# Patient Record
Sex: MALE | Race: WHITE | Employment: OTHER | URBAN - METROPOLITAN AREA
[De-identification: names, ages, dates, MRNs, and addresses within clinical notes are randomized per-mention and may not be internally consistent; named-entity substitution may affect disease eponyms.]

---

## 2017-03-03 ENCOUNTER — APPOINTMENT (OUTPATIENT)
Dept: CT IMAGING | Facility: HOSPITAL | Age: 72
DRG: 065 | End: 2017-03-03
Attending: EMERGENCY MEDICINE
Payer: MEDICARE

## 2017-03-03 ENCOUNTER — HOSPITAL ENCOUNTER (INPATIENT)
Facility: HOSPITAL | Age: 72
LOS: 4 days | Discharge: HOME HEALTH | DRG: 065 | End: 2017-03-07
Attending: EMERGENCY MEDICINE | Admitting: HOSPITALIST
Payer: MEDICARE

## 2017-03-03 ENCOUNTER — APPOINTMENT (OUTPATIENT)
Dept: GENERAL RADIOLOGY | Facility: HOSPITAL | Age: 72
DRG: 065 | End: 2017-03-03
Attending: EMERGENCY MEDICINE
Payer: MEDICARE

## 2017-03-03 DIAGNOSIS — J18.9 COMMUNITY ACQUIRED PNEUMONIA: Primary | ICD-10-CM

## 2017-03-03 DIAGNOSIS — I63.539 CEREBROVASCULAR ACCIDENT (CVA) DUE TO OCCLUSION OF POSTERIOR CEREBRAL ARTERY, UNSPECIFIED BLOOD VESSEL LATERALITY (HCC): ICD-10-CM

## 2017-03-03 LAB
ALBUMIN SERPL BCP-MCNC: 3.4 G/DL (ref 3.5–4.8)
ALP SERPL-CCNC: 81 U/L (ref 32–100)
ALT SERPL-CCNC: 19 U/L (ref 17–63)
ANION GAP SERPL CALC-SCNC: 8 MMOL/L (ref 0–18)
AST SERPL-CCNC: 18 U/L (ref 15–41)
BASOPHILS # BLD: 0 K/UL (ref 0–0.2)
BASOPHILS NFR BLD: 0 %
BILIRUB DIRECT SERPL-MCNC: 0.1 MG/DL (ref 0–0.2)
BILIRUB SERPL-MCNC: 0.5 MG/DL (ref 0.3–1.2)
BILIRUB UR QL: NEGATIVE
BUN SERPL-MCNC: 8 MG/DL (ref 8–20)
BUN/CREAT SERPL: 9.8 (ref 10–20)
CALCIUM SERPL-MCNC: 8.9 MG/DL (ref 8.5–10.5)
CHLORIDE SERPL-SCNC: 107 MMOL/L (ref 95–110)
CLARITY UR: CLEAR
CO2 SERPL-SCNC: 28 MMOL/L (ref 22–32)
COLOR UR: YELLOW
CREAT SERPL-MCNC: 0.82 MG/DL (ref 0.5–1.5)
EOSINOPHIL # BLD: 0 K/UL (ref 0–0.7)
EOSINOPHIL NFR BLD: 0 %
ERYTHROCYTE [DISTWIDTH] IN BLOOD BY AUTOMATED COUNT: 17.2 % (ref 11–15)
GLUCOSE SERPL-MCNC: 200 MG/DL (ref 70–99)
HCT VFR BLD AUTO: 40.7 % (ref 41–52)
HGB BLD-MCNC: 13.5 G/DL (ref 13.5–17.5)
HGB UR QL STRIP.AUTO: NEGATIVE
LACTATE SERPL-SCNC: 1.3 MMOL/L (ref 0.5–2.2)
LEUKOCYTE ESTERASE UR QL STRIP.AUTO: NEGATIVE
LYMPHOCYTES # BLD: 0.9 K/UL (ref 1–4)
LYMPHOCYTES NFR BLD: 11 %
MCH RBC QN AUTO: 29 PG (ref 27–32)
MCHC RBC AUTO-ENTMCNC: 33.2 G/DL (ref 32–37)
MCV RBC AUTO: 87.3 FL (ref 80–100)
MONOCYTES # BLD: 0.3 K/UL (ref 0–1)
MONOCYTES NFR BLD: 3 %
NEUTROPHILS # BLD AUTO: 6.7 K/UL (ref 1.8–7.7)
NEUTROPHILS NFR BLD: 85 %
NITRITE UR QL STRIP.AUTO: NEGATIVE
OSMOLALITY UR CALC.SUM OF ELEC: 300 MOSM/KG (ref 275–295)
PH UR: 7 [PH] (ref 5–8)
PLATELET # BLD AUTO: 269 K/UL (ref 140–400)
PMV BLD AUTO: 8.2 FL (ref 7.4–10.3)
POTASSIUM SERPL-SCNC: 3.5 MMOL/L (ref 3.3–5.1)
PROT SERPL-MCNC: 6.1 G/DL (ref 5.9–8.4)
PROT UR-MCNC: NEGATIVE MG/DL
RBC # BLD AUTO: 4.65 M/UL (ref 4.5–5.9)
SODIUM SERPL-SCNC: 143 MMOL/L (ref 136–144)
TROPONIN I SERPL-MCNC: 0.01 NG/ML (ref ?–0.03)
TROPONIN I SERPL-MCNC: 0.01 NG/ML (ref ?–0.03)
UROBILINOGEN UR STRIP-ACNC: <2
VIT C UR-MCNC: NEGATIVE MG/DL
WBC # BLD AUTO: 7.9 K/UL (ref 4–11)

## 2017-03-03 PROCEDURE — 71010 XR CHEST AP PORTABLE  (CPT=71010): CPT

## 2017-03-03 PROCEDURE — 71260 CT THORAX DX C+: CPT

## 2017-03-03 RX ORDER — 0.9 % SODIUM CHLORIDE 0.9 %
VIAL (ML) INJECTION
Status: COMPLETED
Start: 2017-03-03 | End: 2017-03-03

## 2017-03-03 RX ORDER — SODIUM CHLORIDE 9 MG/ML
INJECTION, SOLUTION INTRAVENOUS CONTINUOUS
Status: DISCONTINUED | OUTPATIENT
Start: 2017-03-03 | End: 2017-03-04

## 2017-03-03 RX ORDER — ACETAMINOPHEN 325 MG/1
650 TABLET ORAL EVERY 6 HOURS PRN
Status: DISCONTINUED | OUTPATIENT
Start: 2017-03-03 | End: 2017-03-07

## 2017-03-03 RX ORDER — BISACODYL 10 MG
10 SUPPOSITORY, RECTAL RECTAL
Status: DISCONTINUED | OUTPATIENT
Start: 2017-03-03 | End: 2017-03-07

## 2017-03-03 RX ORDER — ONDANSETRON 2 MG/ML
4 INJECTION INTRAMUSCULAR; INTRAVENOUS ONCE
Status: COMPLETED | OUTPATIENT
Start: 2017-03-03 | End: 2017-03-03

## 2017-03-03 RX ORDER — DILTIAZEM HYDROCHLORIDE 180 MG/1
180 CAPSULE, COATED, EXTENDED RELEASE ORAL DAILY
Status: ON HOLD | COMMUNITY
End: 2017-03-07

## 2017-03-03 RX ORDER — ENOXAPARIN SODIUM 100 MG/ML
40 INJECTION SUBCUTANEOUS DAILY
Status: DISCONTINUED | OUTPATIENT
Start: 2017-03-04 | End: 2017-03-07

## 2017-03-03 RX ORDER — AMLODIPINE BESYLATE 10 MG/1
10 TABLET ORAL DAILY
Status: ON HOLD | COMMUNITY
End: 2017-03-07

## 2017-03-03 RX ORDER — POLYETHYLENE GLYCOL 3350 17 G/17G
17 POWDER, FOR SOLUTION ORAL DAILY PRN
Status: DISCONTINUED | OUTPATIENT
Start: 2017-03-03 | End: 2017-03-07

## 2017-03-03 RX ORDER — ASPIRIN 325 MG
325 TABLET ORAL DAILY
Status: ON HOLD | COMMUNITY
End: 2017-03-07

## 2017-03-03 RX ORDER — DOCUSATE SODIUM 100 MG/1
100 CAPSULE, LIQUID FILLED ORAL 2 TIMES DAILY PRN
Status: DISCONTINUED | OUTPATIENT
Start: 2017-03-03 | End: 2017-03-07

## 2017-03-03 RX ORDER — ONDANSETRON 2 MG/ML
4 INJECTION INTRAMUSCULAR; INTRAVENOUS EVERY 6 HOURS PRN
Status: DISCONTINUED | OUTPATIENT
Start: 2017-03-03 | End: 2017-03-07

## 2017-03-03 RX ORDER — METOCLOPRAMIDE HYDROCHLORIDE 5 MG/ML
10 INJECTION INTRAMUSCULAR; INTRAVENOUS EVERY 8 HOURS PRN
Status: DISCONTINUED | OUTPATIENT
Start: 2017-03-03 | End: 2017-03-07

## 2017-03-03 RX ORDER — TRIAMTERENE AND HYDROCHLOROTHIAZIDE 37.5; 25 MG/1; MG/1
1 CAPSULE ORAL EVERY MORNING
Status: ON HOLD | COMMUNITY
End: 2017-03-07

## 2017-03-03 NOTE — ED PROVIDER NOTES
Patient Seen in: [facility] AND [facility] Emergency Department    History   Patient presents with:  Dizziness (neurologic)  Nausea/Vomiting/Diarrhea (gastrointestinal)    Stated Complaint:     HPI    65-year-old male with history of hypertension presents with c Current:/53 mmHg  Pulse 69  Temp(Src) 97.5 °F (36.4 °C) (Oral)  Resp 18  Ht 162.6 cm (5' 4\")  Wt 77.111 kg  BMI 29.17 kg/m2  SpO2 96%        Physical Exam    General Appearance: alert, uncomfortable appearing  Eyes: pupils equal and round no p -----------         ------                     CBC W/ DIFFERENTIAL[[id number]]          Abnormal            Final result                 Please view results for these tests on the individual orders.    URINALYSIS WITH CULTURE REFLEX   DAVID DRAW BLUE

## 2017-03-04 ENCOUNTER — APPOINTMENT (OUTPATIENT)
Dept: CT IMAGING | Facility: HOSPITAL | Age: 72
DRG: 065 | End: 2017-03-04
Attending: HOSPITALIST
Payer: MEDICARE

## 2017-03-04 ENCOUNTER — APPOINTMENT (OUTPATIENT)
Dept: CV DIAGNOSTICS | Facility: HOSPITAL | Age: 72
DRG: 065 | End: 2017-03-04
Attending: HOSPITALIST
Payer: MEDICARE

## 2017-03-04 LAB
ANION GAP SERPL CALC-SCNC: 5 MMOL/L (ref 0–18)
BASOPHILS # BLD: 0 K/UL (ref 0–0.2)
BASOPHILS NFR BLD: 0 %
BUN SERPL-MCNC: 7 MG/DL (ref 8–20)
BUN/CREAT SERPL: 9 (ref 10–20)
CALCIUM SERPL-MCNC: 8.3 MG/DL (ref 8.5–10.5)
CHLORIDE SERPL-SCNC: 110 MMOL/L (ref 95–110)
CHOLEST SERPL-MCNC: 150 MG/DL (ref 110–200)
CO2 SERPL-SCNC: 29 MMOL/L (ref 22–32)
CREAT SERPL-MCNC: 0.78 MG/DL (ref 0.5–1.5)
EOSINOPHIL # BLD: 0.1 K/UL (ref 0–0.7)
EOSINOPHIL NFR BLD: 1 %
ERYTHROCYTE [DISTWIDTH] IN BLOOD BY AUTOMATED COUNT: 16.9 % (ref 11–15)
GLUCOSE SERPL-MCNC: 123 MG/DL (ref 70–99)
HBA1C MFR BLD: 7.5 % (ref 4–6)
HCT VFR BLD AUTO: 35.7 % (ref 41–52)
HDLC SERPL-MCNC: 35 MG/DL
HGB BLD-MCNC: 11.7 G/DL (ref 13.5–17.5)
LDLC SERPL CALC-MCNC: 94 MG/DL (ref 0–99)
LYMPHOCYTES # BLD: 1.3 K/UL (ref 1–4)
LYMPHOCYTES NFR BLD: 17 %
MAGNESIUM SERPL-MCNC: 1.9 MG/DL (ref 1.8–2.5)
MCH RBC QN AUTO: 28.8 PG (ref 27–32)
MCHC RBC AUTO-ENTMCNC: 32.9 G/DL (ref 32–37)
MCV RBC AUTO: 87.5 FL (ref 80–100)
MONOCYTES # BLD: 0.4 K/UL (ref 0–1)
MONOCYTES NFR BLD: 6 %
NEUTROPHILS # BLD AUTO: 5.7 K/UL (ref 1.8–7.7)
NEUTROPHILS NFR BLD: 76 %
NONHDLC SERPL-MCNC: 115 MG/DL
OSMOLALITY UR CALC.SUM OF ELEC: 297 MOSM/KG (ref 275–295)
PLATELET # BLD AUTO: 241 K/UL (ref 140–400)
PMV BLD AUTO: 8.1 FL (ref 7.4–10.3)
POTASSIUM SERPL-SCNC: 3.5 MMOL/L (ref 3.3–5.1)
PROCALCITONIN SERPL-MCNC: <0.05 NG/ML (ref ?–0.11)
RBC # BLD AUTO: 4.07 M/UL (ref 4.5–5.9)
SODIUM SERPL-SCNC: 144 MMOL/L (ref 136–144)
T3 SERPL-MCNC: 0.88 NG/ML (ref 0.87–1.78)
T4 FREE SERPL-MCNC: 0.79 NG/DL (ref 0.58–1.64)
TRIGL SERPL-MCNC: 107 MG/DL (ref 1–149)
TSH SERPL-ACNC: 0.07 UIU/ML (ref 0.34–5.6)
WBC # BLD AUTO: 7.5 K/UL (ref 4–11)

## 2017-03-04 PROCEDURE — 93306 TTE W/DOPPLER COMPLETE: CPT

## 2017-03-04 PROCEDURE — 70450 CT HEAD/BRAIN W/O DYE: CPT

## 2017-03-04 PROCEDURE — 93306 TTE W/DOPPLER COMPLETE: CPT | Performed by: INTERNAL MEDICINE

## 2017-03-04 RX ORDER — CLOPIDOGREL BISULFATE 75 MG/1
75 TABLET ORAL DAILY
Status: DISCONTINUED | OUTPATIENT
Start: 2017-03-04 | End: 2017-03-05

## 2017-03-04 RX ORDER — MECLIZINE HCL 12.5 MG/1
12.5 TABLET ORAL 3 TIMES DAILY PRN
Status: DISCONTINUED | OUTPATIENT
Start: 2017-03-04 | End: 2017-03-07

## 2017-03-04 RX ORDER — ASPIRIN 81 MG/1
81 TABLET, CHEWABLE ORAL DAILY
Status: DISCONTINUED | OUTPATIENT
Start: 2017-03-04 | End: 2017-03-05

## 2017-03-04 RX ORDER — POTASSIUM CHLORIDE 20 MEQ/1
40 TABLET, EXTENDED RELEASE ORAL EVERY 4 HOURS
Status: COMPLETED | OUTPATIENT
Start: 2017-03-04 | End: 2017-03-04

## 2017-03-04 NOTE — DISCHARGE PLANNING
LELA following up on d/c planning for the patient. He lives alone and has been independent with his care. He uses no assistive devices and has no h/o HHC or rehab. He is active and was driving prior to admission.  No d/c needs identified at this time, but LELA

## 2017-03-04 NOTE — PROGRESS NOTES
[facility] Hospitalist Progress Note     PCP: Awais Dior.  Kelly Stinson MD    CC: Follow up       Assessment/Plan:     Luke Bobo is a 66year old male with PMH sig for HTN on mulitple meds who presents with 2 days of nausea/vomiting and dizzness.  FOund to have po Service number: [phone]    Subjective     States he feels ab out the same. Only symptomatic when sitting up or standing. Denies vertigo.    No CP, SOB, or N/V.  NO BM's      Objective     OBJECTIVE:  Temp:  [97 °F (36.1 °C)-98.4 °F (36.9 °C)] 98.4 °F 2137   [facility]  0.01  0.01       Imaging:  Ct Brain Or Head (34802)    3/4/2017  PROCEDURE: CT BRAIN OR HEAD (CPT=70450)  COMPARISON: None. INDICATIONS: Lightheadedness and dizziness.   TECHNIQUE: CT images were obtained without contrast material.  Automated silhouette abnormality or cardiomegaly. Unremarkable pulmonary vasculature. MEDIAST/BRENNA:   Atherosclerotic aorta with no visible aneurysm. LUNGS/PLEURA: There is elevation of the right hemidiaphragm .   There is a small opacity in the peripheral aspect LUNGS/PLEURA: Central airways are patent. Bilateral perihilar bronchial wall thickening. Minimal dependent subsegmental atelectasis in the lung bases. Small ill-defined pulmonary opacity in the middle lobe.   Scattered calcified granulomas in the middle Coronary artery calcifications

## 2017-03-04 NOTE — PROGRESS NOTES
OCCUPATIONAL THERAPY EVALUATION - INPATIENT     Room Number: 544/544-A  Evaluation Date: 3/4/2017  Type of Evaluation: Initial  Presenting Problem: PNA    Physician Order: IP Consult to Occupational Therapy  Reason for Therapy: ADL/IADL Dysfunction and Dis Inpatient Daily Activity Short Form  How much help from another person does the patient currently need…  -   Putting on and taking off regular lower body clothing?: None  -   Bathing (including washing, rinsing, drying)?: A Little  -   Toileting, which inc

## 2017-03-04 NOTE — PLAN OF CARE
Problem: Patient/Family Goals  Goal: Patient/Family Long Term Goal  Patient’s Long Term Goal:To be ready for discharge    Interventions:  - monitor vital signs  -administer medications  - See additional Care Plan goals for specific interventions   Outcome: limitations  - Instruct pt to call for assistance with activity based on assessment  - Modify environment to reduce risk of injury  - Provide assistive devices as appropriate  - Consider OT/PT consult to assist with strengthening/mobility  - Encourage toil

## 2017-03-04 NOTE — H&P
[facility] Hospitalist H&P     CC: Patient presents with:  Dizziness (neurologic)  Nausea/Vomiting/Diarrhea (gastrointestinal)       PCP: Ita Robles.  Odilia Sanon MD      Assessment and Plan     Ana Magallanes is a 66year old male with PMH sig for HTN on mulitple med PMH  Past Medical History   Diagnosis Date   • Essential hypertension         PSH  History reviewed. No pertinent past surgical history.      ALL:    Penicillins                  Home Medications:    Outpatient Prescriptions Marked as Taking for the 3/3 PROCEDURE: XR CHEST AP PORTABLE (CPT=71010) TIME: 1636 hr.   COMPARISON: [facility], X CHEST PA LAT ROUTINE, 9/19/2014, 9:10. INDICATIONS: Weakness, dizziness and nausea for 2 days. TECHNIQUE:   Single view.    FINDINGS:  CARDIAC/ the thyroid, with both lobes demonstrating multiple nodules. There  is also nodular extension of the posterior lower pole of the thyroid, which measures approximately 37 mm, extending into the upper mediastinum. No mediastinal or hilar lymphadenopathy.  L function tests. 4.  Scattered calcified granulomas within the right lung. 5. Low-density foci within the liver most likely representing cysts however they are not fully characterized on this exam. 6.  Colonic diverticulosis. 7.  Small sized hiatal hernia.

## 2017-03-04 NOTE — CONSULTS
ENT consult  65 y/o male admitted with nausea, vomitting, lightheadedness starting on 3/2/17. His symptoms are worse when he gets up. There is no true vertigo.   A CT revealed a large multinodular goiter with substernal extension and some tracheal narrowi

## 2017-03-05 LAB
ANION GAP SERPL CALC-SCNC: 7 MMOL/L (ref 0–18)
BASOPHILS # BLD: 0 K/UL (ref 0–0.2)
BASOPHILS NFR BLD: 1 %
BUN SERPL-MCNC: 7 MG/DL (ref 8–20)
BUN/CREAT SERPL: 8.8 (ref 10–20)
CALCIUM SERPL-MCNC: 8.8 MG/DL (ref 8.5–10.5)
CHLORIDE SERPL-SCNC: 110 MMOL/L (ref 95–110)
CO2 SERPL-SCNC: 26 MMOL/L (ref 22–32)
CREAT SERPL-MCNC: 0.8 MG/DL (ref 0.5–1.5)
EOSINOPHIL # BLD: 0.2 K/UL (ref 0–0.7)
EOSINOPHIL NFR BLD: 3 %
ERYTHROCYTE [DISTWIDTH] IN BLOOD BY AUTOMATED COUNT: 17.1 % (ref 11–15)
GLUCOSE SERPL-MCNC: 124 MG/DL (ref 70–99)
HCT VFR BLD AUTO: 39 % (ref 41–52)
HGB BLD-MCNC: 12.8 G/DL (ref 13.5–17.5)
LYMPHOCYTES # BLD: 1.6 K/UL (ref 1–4)
LYMPHOCYTES NFR BLD: 26 %
MCH RBC QN AUTO: 28.9 PG (ref 27–32)
MCHC RBC AUTO-ENTMCNC: 32.7 G/DL (ref 32–37)
MCV RBC AUTO: 88.2 FL (ref 80–100)
MONOCYTES # BLD: 0.4 K/UL (ref 0–1)
MONOCYTES NFR BLD: 6 %
NEUTROPHILS # BLD AUTO: 4 K/UL (ref 1.8–7.7)
NEUTROPHILS NFR BLD: 64 %
OSMOLALITY UR CALC.SUM OF ELEC: 295 MOSM/KG (ref 275–295)
PLATELET # BLD AUTO: 254 K/UL (ref 140–400)
PMV BLD AUTO: 8.3 FL (ref 7.4–10.3)
POTASSIUM SERPL-SCNC: 4 MMOL/L (ref 3.3–5.1)
POTASSIUM SERPL-SCNC: 4.1 MMOL/L (ref 3.3–5.1)
RBC # BLD AUTO: 4.42 M/UL (ref 4.5–5.9)
SODIUM SERPL-SCNC: 143 MMOL/L (ref 136–144)
WBC # BLD AUTO: 6.2 K/UL (ref 4–11)

## 2017-03-05 PROCEDURE — 99223 1ST HOSP IP/OBS HIGH 75: CPT | Performed by: OTHER

## 2017-03-05 RX ORDER — AMLODIPINE BESYLATE 5 MG/1
5 TABLET ORAL DAILY
Status: DISCONTINUED | OUTPATIENT
Start: 2017-03-05 | End: 2017-03-07

## 2017-03-05 RX ORDER — ATORVASTATIN CALCIUM 10 MG/1
10 TABLET, FILM COATED ORAL NIGHTLY
Status: DISCONTINUED | OUTPATIENT
Start: 2017-03-05 | End: 2017-03-07

## 2017-03-05 RX ORDER — METHIMAZOLE 5 MG/1
5 TABLET ORAL DAILY
Status: DISCONTINUED | OUTPATIENT
Start: 2017-03-05 | End: 2017-03-07

## 2017-03-05 RX ORDER — DILTIAZEM HYDROCHLORIDE 180 MG/1
180 CAPSULE, COATED, EXTENDED RELEASE ORAL DAILY
Status: DISCONTINUED | OUTPATIENT
Start: 2017-03-05 | End: 2017-03-05

## 2017-03-05 RX ORDER — ASPIRIN 325 MG
325 TABLET ORAL DAILY
Status: DISCONTINUED | OUTPATIENT
Start: 2017-03-05 | End: 2017-03-07

## 2017-03-05 NOTE — PLAN OF CARE
Patient Centered Care    • Patient preferences are identified and integrated in the patient's plan of care Progressing        Patient/Family Goals    • Patient/Family Long Term Goal Progressing    • Patient/Family Short Term Goal Progressing        RESPIRA

## 2017-03-05 NOTE — PROGRESS NOTES
Dr. Lakeisha Zee notified patient had 2.42 sec pause,  then SR 70. /61, HR 62, pulse ox 95%. Patient asymptomatic.

## 2017-03-05 NOTE — PROGRESS NOTES
Endocrine Note    Full Consult Note to follow    65 y/o M presented with palpitations, shortness and breath and new onset chest pain. CT scan demonstrated multinodular goiter and labs reveal subclinical hyperthyroidism.   Reviewed ENT consult and sign out

## 2017-03-05 NOTE — PROGRESS NOTES
[facility] Hospitalist Progress Note     PCP: Johanna Mcgregor. Ignacia Potter MD    CC: Follow up.  Thyroid enlargement, dizzines       Assessment/Plan:     Marianna Pacheco is a 66year old male with PMH sig for HTN on mulitple meds who presents with 2 days of nausea/vomiting plan  -Discussed with Endo, TSH low but Free T3 and T4 normal.  Thyroid US ordered.   -started on methimazole on 3/5    FEN  -d/c IVF, good po intake,  neg orthostatics  -lytes in AM  -general diet    PPX; Lovenox      Further recommendations pending patien magnesium hydroxide    Data Review:       Labs:     Recent Labs   Lab  03/03/17   1606  03/04/17   0611  03/05/17   0726   WBC  7.9  7.5  6.2   HGB  13.5  11.7*  12.8*   MCV  87.3  87.5  88.2   PLT  269  241  254       Recent Labs   Lab  03/03/17   1606  0

## 2017-03-05 NOTE — HISTORICAL OFFICE NOTE
Aurelia Trevino  : 1945  ACCOUNT:  [id number]  [phone]  PCP:  None  TODAY'S DATE: 10/21/2016  DICTATED BY:  Bridger Sabillon M.D.]    CHIEF COMPLAINT: [Followup of Supraventricular Tachycardia.]    HPI:  [On 10/21/2016, Camelia Rosales, a 54-year-old m normocephalic. EYES: conjunctivae not injected and no xanthelasma. ENT: mucosa pink and moist. NECK: jugular venous pressure not elevated. RESP: respirations with normal rate and rhythm, clear to auscultation.  GI: no masses, tenderness or hepatosplenomegal BP: 150 / 70   Study date:Jul 8 2014 7:01AM Location: [place]   Ordering MD:      Dustin Pimentel MD   Interpreting Physician:  Dustin Pimentel MD  Sonographer:      Radha Barrera RDCS   ?  Left ventricle:  The cavity size was normal. Systolic function was jacqueline

## 2017-03-05 NOTE — CONSULTS
Neurology Inpatient Consult Note    Justine Cotter : 3/26/1945   Referring Physician: Dr. Jacey Hewitt  HPI:     Justine Cotter is a 66year old male who is being seen in neurologic evaluation. Patient is being seen in evaluation for dizziness.   P pain, no palpitations  GI: see HPI  GENITAL/: no dysuria, no frequency  MUSCULOSKELETAL: see HPI  PSYCH: no depression, no anxiety  NEURO: see HPI    EXAM:     Vitals:  /61 mmHg  Pulse 62  Temp(Src) 99 °F (37.2 °C) (Oral)  Resp 18  Ht 64.8\"  Wt 16 valve: Cusp separation was reduced. There was mild stenosis. Mean     gradient: 13mm Hg (S). 3. Atrial septum: A patent foramen ovale cannot be excluded.       EKG: Sinus rhythm      LABS: reviewed   LDL: 94    ASSESSMENT AND PLAN:   Dizziness  Although no

## 2017-03-06 ENCOUNTER — APPOINTMENT (OUTPATIENT)
Dept: MRI IMAGING | Facility: HOSPITAL | Age: 72
DRG: 065 | End: 2017-03-06
Attending: Other
Payer: MEDICARE

## 2017-03-06 ENCOUNTER — APPOINTMENT (OUTPATIENT)
Dept: ULTRASOUND IMAGING | Facility: HOSPITAL | Age: 72
DRG: 065 | End: 2017-03-06
Attending: HOSPITALIST
Payer: MEDICARE

## 2017-03-06 LAB
BASOPHILS # BLD: 0 K/UL (ref 0–0.2)
BASOPHILS NFR BLD: 1 %
EOSINOPHIL # BLD: 0.1 K/UL (ref 0–0.7)
EOSINOPHIL NFR BLD: 2 %
ERYTHROCYTE [DISTWIDTH] IN BLOOD BY AUTOMATED COUNT: 17.2 % (ref 11–15)
HCT VFR BLD AUTO: 38.1 % (ref 41–52)
HGB BLD-MCNC: 12.6 G/DL (ref 13.5–17.5)
LYMPHOCYTES # BLD: 1.5 K/UL (ref 1–4)
LYMPHOCYTES NFR BLD: 22 %
MAGNESIUM SERPL-MCNC: 2 MG/DL (ref 1.8–2.5)
MCH RBC QN AUTO: 28.6 PG (ref 27–32)
MCHC RBC AUTO-ENTMCNC: 33 G/DL (ref 32–37)
MCV RBC AUTO: 86.8 FL (ref 80–100)
MONOCYTES # BLD: 0.4 K/UL (ref 0–1)
MONOCYTES NFR BLD: 6 %
NEUTROPHILS # BLD AUTO: 4.6 K/UL (ref 1.8–7.7)
NEUTROPHILS NFR BLD: 70 %
PLATELET # BLD AUTO: 245 K/UL (ref 140–400)
PMV BLD AUTO: 8 FL (ref 7.4–10.3)
POTASSIUM SERPL-SCNC: 4.1 MMOL/L (ref 3.3–5.1)
RBC # BLD AUTO: 4.39 M/UL (ref 4.5–5.9)
T3FREE SERPL-MCNC: 3.7 PG/ML (ref 2.53–4.29)
WBC # BLD AUTO: 6.6 K/UL (ref 4–11)

## 2017-03-06 PROCEDURE — 76536 US EXAM OF HEAD AND NECK: CPT

## 2017-03-06 PROCEDURE — 70549 MR ANGIOGRAPH NECK W/O&W/DYE: CPT

## 2017-03-06 PROCEDURE — 70544 MR ANGIOGRAPHY HEAD W/O DYE: CPT

## 2017-03-06 PROCEDURE — 99222 1ST HOSP IP/OBS MODERATE 55: CPT | Performed by: INTERNAL MEDICINE

## 2017-03-06 PROCEDURE — 70551 MRI BRAIN STEM W/O DYE: CPT

## 2017-03-06 PROCEDURE — 99232 SBSQ HOSP IP/OBS MODERATE 35: CPT | Performed by: OTHER

## 2017-03-06 RX ORDER — CLOPIDOGREL BISULFATE 75 MG/1
75 TABLET ORAL DAILY
Status: DISCONTINUED | OUTPATIENT
Start: 2017-03-06 | End: 2017-03-07

## 2017-03-06 NOTE — CM/SW NOTE
CTL/Rounds: Spoke with patient at bedside regarding discharge needs. Patient states that he is independent with ADLs and driving. He lives alone but has friends that will check in on him. He is ambulating in room without assist. He is on RA.  Patient states

## 2017-03-06 NOTE — PROGRESS NOTES
[facility] Heart Cardiology   Progress Note    María Haque Patient Status:  Inpatient    3/26/1945 MRN [id number]   Location [facility] 5SW/SE Attending Katiana Cervantes MD   [address] Day # 3 PCP Caty Arreola.  Julio C Ferreira MD     Hx of awaiting MRI/MRA and carotid reults  -orthostatics appear negative, recheck today  -echo on 3/4 with EF55-60%, no WMA, mild AS, PFO not excluded  -LDL=94 and goal LDL<70, started on Lipitor 10mg    3) Hyperthyroidism  -per ENT and endocrinology, started on

## 2017-03-06 NOTE — PROGRESS NOTES
Neurology Inpatient Follow-up Note      HPI:     Patient is being seen in follow up. Symptoms about the same.       Past Medical Hisotory:  Reviewed    Medications:  Reviewed    Allergies:    Penicillins                   ROS:   GENERAL: no weight loss or bifurcation without significant stenosis. 2. 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   3. Normal caliber b page with any additional questions / concerns.     Gwen Castano MD  [address] [place], [place] [id number] [phone]

## 2017-03-06 NOTE — CONSULTS
[facility]    PATIENT'S NAME: Yuridia Nurse   ATTENDING PHYSICIAN: Cruz Cortez MD   CONSULTING PHYSICIAN: Ivan Sears.  Natasha Zhu MD   PATIENT ACCOUNT#:   [id number]    LOCATION:  [address] [place] [address] Dr RECORD #:   [id number]       DATE OF BIRTH:  [birth year] This would be determined by his ability to control his thyroid function and his other medical conditions. Currently he is now being evaluated and possibly treated for a new stroke, and will be started with anticoagulation.   This may preclude any treatment

## 2017-03-06 NOTE — CONSULTS
[facility]    Report of Consultation    Jose G File Patient Status:  Inpatient    3/26/1945 MRN [id number]   Location [facility] 5SW/SE Attending Priyanka Russell MD   [facility] Day # 3 PCP Aleyda Rodriguez.  Rosangela Melendez MD     Date of Admission:  3 (COLACE) cap 100 mg, 100 mg, Oral, BID PRN  •  PEG 3350 (MIRALAX) powder packet 17 g, 17 g, Oral, Daily PRN  •  bisacodyl (DULCOLAX) rectal suppository 10 mg, 10 mg, Rectal, Daily PRN  •  magnesium hydroxide (MILK OF MAGNESIA) 400 MG/5ML suspension 30 mL, pruritus, rash, urticaria, arthralgias, arthritis, fever, abnormal taste sensation, nausea, or vomiting in up to 13 percent of patients   > agranulocytosis (prevalence of 0.1 to 0.5 %, and usually occurs within the first two months of treatment, but may oc

## 2017-03-06 NOTE — PLAN OF CARE
SAFETY ADULT - FALL    • Free from fall injury Progressing        Patient denies dizziness today,  Gait steady, patient calls for assistance appropriately.   nonslip stockings on,  frquent rounding,

## 2017-03-06 NOTE — PROGRESS NOTES
[facility] Hospitalist Progress Note     PCP: Shelli Maza. Kody Osorio MD    CC: Follow up.  Thyroid enlargement, dizzines       Assessment/Plan:     Amy Metzger is a 66year old male with PMH sig for HTN on mulitple meds who presents with 2 days of nausea/vomiting norvasc at 5 (on 10 at home)  -cont to hold dilt 2/2 pause noted on tele 3/5 see above  -holding triam/hctz 37.5-25 currently    Enlarged thyroid / poss hyperthyroidism /Thyroid nodules  -incidental finding on CT and MRI, encapsulates trachea, no sob, no w no organomegaly, bowel sounds present  MSK:  no clubbing, no cyanosis.  No Lower extremity edema  Skin: no rashes or lesions, well perfused  Psych: mood stable, cooperative  Neuro: No focal deficits noted, ambulating well, no issues with coordination    Me

## 2017-03-07 VITALS
OXYGEN SATURATION: 98 % | HEIGHT: 64.8 IN | BODY MASS INDEX: 26.49 KG/M2 | HEART RATE: 75 BPM | SYSTOLIC BLOOD PRESSURE: 141 MMHG | RESPIRATION RATE: 18 BRPM | DIASTOLIC BLOOD PRESSURE: 76 MMHG | WEIGHT: 159 LBS | TEMPERATURE: 98 F

## 2017-03-07 LAB
ANION GAP SERPL CALC-SCNC: 7 MMOL/L (ref 0–18)
BASOPHILS # BLD: 0 K/UL (ref 0–0.2)
BASOPHILS NFR BLD: 1 %
BUN SERPL-MCNC: 14 MG/DL (ref 8–20)
BUN/CREAT SERPL: 16.7 (ref 10–20)
CALCIUM SERPL-MCNC: 8.8 MG/DL (ref 8.5–10.5)
CHLORIDE SERPL-SCNC: 110 MMOL/L (ref 95–110)
CO2 SERPL-SCNC: 25 MMOL/L (ref 22–32)
CREAT SERPL-MCNC: 0.84 MG/DL (ref 0.5–1.5)
EOSINOPHIL # BLD: 0.2 K/UL (ref 0–0.7)
EOSINOPHIL NFR BLD: 3 %
ERYTHROCYTE [DISTWIDTH] IN BLOOD BY AUTOMATED COUNT: 17.1 % (ref 11–15)
GLUCOSE SERPL-MCNC: 136 MG/DL (ref 70–99)
HCT VFR BLD AUTO: 39.9 % (ref 41–52)
HEMOCCULT STL QL: NEGATIVE
HGB BLD-MCNC: 13.3 G/DL (ref 13.5–17.5)
LYMPHOCYTES # BLD: 1.4 K/UL (ref 1–4)
LYMPHOCYTES NFR BLD: 19 %
MAGNESIUM SERPL-MCNC: 2 MG/DL (ref 1.8–2.5)
MCH RBC QN AUTO: 29.1 PG (ref 27–32)
MCHC RBC AUTO-ENTMCNC: 33.3 G/DL (ref 32–37)
MCV RBC AUTO: 87.3 FL (ref 80–100)
MONOCYTES # BLD: 0.3 K/UL (ref 0–1)
MONOCYTES NFR BLD: 5 %
NEUTROPHILS # BLD AUTO: 5.3 K/UL (ref 1.8–7.7)
NEUTROPHILS NFR BLD: 72 %
OSMOLALITY UR CALC.SUM OF ELEC: 297 MOSM/KG (ref 275–295)
PLATELET # BLD AUTO: 278 K/UL (ref 140–400)
PMV BLD AUTO: 8.3 FL (ref 7.4–10.3)
POTASSIUM SERPL-SCNC: 4.3 MMOL/L (ref 3.3–5.1)
RBC # BLD AUTO: 4.57 M/UL (ref 4.5–5.9)
SODIUM SERPL-SCNC: 142 MMOL/L (ref 136–144)
WBC # BLD AUTO: 7.3 K/UL (ref 4–11)

## 2017-03-07 PROCEDURE — 99231 SBSQ HOSP IP/OBS SF/LOW 25: CPT | Performed by: INTERNAL MEDICINE

## 2017-03-07 RX ORDER — ASPIRIN 325 MG
325 TABLET ORAL DAILY
Qty: 30 TABLET | Refills: 0 | Status: SHIPPED | OUTPATIENT
Start: 2017-03-07 | End: 2017-03-15

## 2017-03-07 RX ORDER — ATORVASTATIN CALCIUM 10 MG/1
10 TABLET, FILM COATED ORAL NIGHTLY
Qty: 30 TABLET | Refills: 0 | Status: SHIPPED | OUTPATIENT
Start: 2017-03-07 | End: 2017-03-15

## 2017-03-07 RX ORDER — METHIMAZOLE 5 MG/1
5 TABLET ORAL DAILY
Qty: 30 TABLET | Refills: 0 | Status: SHIPPED | OUTPATIENT
Start: 2017-03-07 | End: 2017-03-15

## 2017-03-07 RX ORDER — CLOPIDOGREL BISULFATE 75 MG/1
75 TABLET ORAL DAILY
Qty: 30 TABLET | Refills: 0 | Status: SHIPPED | OUTPATIENT
Start: 2017-03-07 | End: 2017-03-15

## 2017-03-07 RX ORDER — DILTIAZEM HYDROCHLORIDE 120 MG/1
120 CAPSULE, COATED, EXTENDED RELEASE ORAL DAILY
Qty: 30 CAPSULE | Refills: 0 | Status: SHIPPED | OUTPATIENT
Start: 2017-03-07 | End: 2017-03-15

## 2017-03-07 RX ORDER — AMLODIPINE BESYLATE 10 MG/1
5 TABLET ORAL DAILY
Qty: 30 TABLET | Refills: 0 | Status: SHIPPED | OUTPATIENT
Start: 2017-03-07 | End: 2017-03-15

## 2017-03-07 NOTE — HOME CARE LIAISON
MET WITH PTNT TO DISCUSS HOME HEALTH SERVICES AND COVERAGE CRITERIA. PTNT AGREEABLE TO [address] [place] [address]. PTNT GIVEN RESIDENTIAL BROCHURE AND CONTACT INFORMATION. Emilee RN/PT.   PTNT ADMITTED TO [facility] 3/3/17, D/T 2 DAYS O

## 2017-03-07 NOTE — PROGRESS NOTES
[facility] Heart Cardiology   Progress Note    María Haque Patient Status:  Inpatient    3/26/1945 MRN [id number]   Location [facility] 5SW/SE Attending Katiana Cervantes MD   [address] Day # 4  Sweetie Ferreira MD       Hx stroke, neuro following and started aspirin and Plavix  -orthostatics negative  -echo on 3/4 with EF55-60%, no WMA, mild AS, PFO not excluded  -LDL=94 and goal LDL<70, started on Lipitor 10mg    3) Hyperthyroidism  -per ENT and endocrinology, started on Me distal aspect left P1 segment of the left PCA. 3. Attenuated distal left parieto-occipital segment branches.  4. Anatomic variation involving the AICA and PICA segments bilaterally with dominant right AICA (anterior inferior cerebellar artery) segment and s with restricted diffusion.  Smaller multifocal punctate areas of acute infarction in the inferior aspect of the right cerebellar hemisphere BRAINSTEM: Small 4.4 mm chronic right paramedian brain stem lacunar infarct, right roxann CSF SPACES: Ventricles, ciste periventricular lacunar infarct. Us Head/neck (cpt=76536)    3/6/2017  CONCLUSION:  1. Very large multinodular thyroid with heterogeneous appearance.  2. Deep substernal extension of the right lobe lower pole into the mediastinum is not adequately a

## 2017-03-07 NOTE — FACE TO FACE NOTE
[facility]  Face to Face Encounter Note    Hayley Stiles Patient Status:  Inpatient    3/26/1945 MRN [id number]   Location [facility] 5SW/SE Attending Koki Ortiz MD   [facility] Day # 4  [facility]       I, or a non-physician practit face-to-face encounter have been communicated with the patient's community-based physician who will be assuming this patient's home health plan of care.     Date:  3/7/17  Physician:  Army Sutherland

## 2017-03-07 NOTE — PLAN OF CARE
Ok to discharge patient home. Discharge instructions explained to patient. Prescription given. Tele and IV discontinued. Home meds returned. Patient made appointment with John Agosto. Patient sent home with friend.

## 2017-03-07 NOTE — PROGRESS NOTES
[facility]    Progress Note    Luke Boob Patient Status:  Inpatient    3/26/1945 MRN [id number]   Location [facility] 5SW/SE Attending Chela aCrrillo MD   [facility] Day # 4  Sweetie Stinson MD     Subjective:  Feels well.

## 2017-03-07 NOTE — DISCHARGE PLANNING
GABRIEL received for home health services. Residential chosen. Referral made to [facility].     TALITA floyd [id number]

## 2017-03-08 ENCOUNTER — TELEPHONE (OUTPATIENT)
Dept: CARDIOLOGY UNIT | Facility: HOSPITAL | Age: 72
End: 2017-03-08

## 2017-03-09 ENCOUNTER — TELEPHONE (OUTPATIENT)
Dept: CARDIOLOGY UNIT | Facility: HOSPITAL | Age: 72
End: 2017-03-09

## 2017-03-09 ENCOUNTER — TELEPHONE (OUTPATIENT)
Dept: FAMILY MEDICINE CLINIC | Facility: CLINIC | Age: 72
End: 2017-03-09

## 2017-03-09 ENCOUNTER — OFFICE VISIT (OUTPATIENT)
Dept: CARDIOLOGY CLINIC | Facility: HOSPITAL | Age: 72
End: 2017-03-09
Attending: INTERNAL MEDICINE
Payer: MEDICARE

## 2017-03-09 VITALS
HEART RATE: 78 BPM | DIASTOLIC BLOOD PRESSURE: 61 MMHG | WEIGHT: 162 LBS | BODY MASS INDEX: 27 KG/M2 | SYSTOLIC BLOOD PRESSURE: 138 MMHG | OXYGEN SATURATION: 98 %

## 2017-03-09 DIAGNOSIS — I47.1 PSVT (PAROXYSMAL SUPRAVENTRICULAR TACHYCARDIA) (HCC): ICD-10-CM

## 2017-03-09 DIAGNOSIS — I48.91 ATRIAL FIBRILLATION (HCC): Primary | ICD-10-CM

## 2017-03-09 DIAGNOSIS — F17.200 TOBACCO USE DISORDER: ICD-10-CM

## 2017-03-09 DIAGNOSIS — I10 HTN (HYPERTENSION), BENIGN: Chronic | ICD-10-CM

## 2017-03-09 DIAGNOSIS — I63.9 CEREBROVASCULAR ACCIDENT (CVA), UNSPECIFIED MECHANISM (HCC): ICD-10-CM

## 2017-03-09 DIAGNOSIS — I48.0 PAF (PAROXYSMAL ATRIAL FIBRILLATION) (HCC): Chronic | ICD-10-CM

## 2017-03-09 DIAGNOSIS — I48.0 PAROXYSMAL ATRIAL FIBRILLATION (HCC): ICD-10-CM

## 2017-03-09 PROBLEM — I47.10 PSVT (PAROXYSMAL SUPRAVENTRICULAR TACHYCARDIA): Status: ACTIVE | Noted: 2017-03-09

## 2017-03-09 PROCEDURE — 99211 OFF/OP EST MAY X REQ PHY/QHP: CPT | Performed by: NURSE PRACTITIONER

## 2017-03-09 PROCEDURE — 99214 OFFICE O/P EST MOD 30 MIN: CPT | Performed by: NURSE PRACTITIONER

## 2017-03-09 PROCEDURE — 93005 ELECTROCARDIOGRAM TRACING: CPT

## 2017-03-09 PROCEDURE — 93010 ELECTROCARDIOGRAM REPORT: CPT | Performed by: NURSE PRACTITIONER

## 2017-03-09 NOTE — PATIENT INSTRUCTIONS
Continue all your same medications    Quit smoking, consider joining smoking cessation support group    Call if having any dizziness, lightheadedness, heart racing, palpitations, chest pain, shortness of breath, coughing, swelling, weight gain or worsening

## 2017-03-09 NOTE — TELEPHONE ENCOUNTER
Called [facility] back to notify them that our office/Dr. Levon Mcclain has not seen this patient before, but the patient does have an upcoming appointment next week.   No answer from [facility]; I did not leave a voicemail due to the reason that there was no specific name taken

## 2017-03-09 NOTE — PROGRESS NOTES
[facility] Patient Status:  Outpatient    3/26/1945 MRN [id number]   Location [place] [address]  MD Rosy Campos MD Costtammy Shelton is a 66year old male who presents to clini years, states he actually increased smoking since he has been retired and not ready to quit. He has an appointment to see Dr. Jesus Herrera or his partner on March 15 and is seeing Dr. Cecilia Angelo on April 7.  He is not able to see Edson Scanlon due to his insurance changin cyanosis or edema  Pulses: 2+ and symmetric  Neurologic: Grossly normal    Cardiographics:    ECG: Today 3/9/2017 sinus rhythm 81 bpm with occasional PVC,  ms no abnormalities  Echocardiogram: 3/4/2017 EF 55-60%, no wall motion of an abnormality, mi nonsustained VT with recent acute bilateral cerebellar CVA on 3/3/2017. He also had a 2. second pause, but determined not to have sick sinus syndrome, no pacemaker was necessary.   History of SVT ablation October 2016 with Dr. Laura Aguilar and had been on Xarelto vegetables, lunch meats, processed meats like hotdogs, sausage, oconnell, pepperoni, soy sauce, pre-packaged rice or potatoes. Please remember to read nutrition labels for sodium content.      · Exercise daily as tolerated, with goal of doing moderate aerobic

## 2017-03-10 ENCOUNTER — TELEPHONE (OUTPATIENT)
Dept: FAMILY MEDICINE CLINIC | Facility: CLINIC | Age: 72
End: 2017-03-10

## 2017-03-10 NOTE — TELEPHONE ENCOUNTER
[facility] called to let us know that patient was seen in the hospital by Dr. Phyllis Gupta, but has an establish care appt with Dr. Sussy Oleary on the 15th. [facility] will send paperworks for home health orders - need nurse and therapy.   They would like us to know that for his appo

## 2017-03-15 ENCOUNTER — OFFICE VISIT (OUTPATIENT)
Dept: FAMILY MEDICINE CLINIC | Facility: CLINIC | Age: 72
End: 2017-03-15

## 2017-03-15 VITALS
SYSTOLIC BLOOD PRESSURE: 128 MMHG | WEIGHT: 158 LBS | BODY MASS INDEX: 28.35 KG/M2 | OXYGEN SATURATION: 91 % | DIASTOLIC BLOOD PRESSURE: 70 MMHG | HEART RATE: 71 BPM | HEIGHT: 62.5 IN

## 2017-03-15 DIAGNOSIS — I10 ESSENTIAL HYPERTENSION: ICD-10-CM

## 2017-03-15 DIAGNOSIS — Z79.84 LONG TERM CURRENT USE OF ORAL HYPOGLYCEMIC DRUG: ICD-10-CM

## 2017-03-15 DIAGNOSIS — IMO0001 UNCONTROLLED TYPE 2 DIABETES MELLITUS WITHOUT COMPLICATION, WITHOUT LONG-TERM CURRENT USE OF INSULIN: Primary | ICD-10-CM

## 2017-03-15 DIAGNOSIS — Z86.73 HISTORY OF STROKE WITHOUT RESIDUAL DEFICITS: ICD-10-CM

## 2017-03-15 DIAGNOSIS — E04.2 MULTINODULAR THYROID: ICD-10-CM

## 2017-03-15 DIAGNOSIS — E78.00 HYPERCHOLESTEREMIA: ICD-10-CM

## 2017-03-15 DIAGNOSIS — Z01.89 ENCOUNTER FOR ROUTINE LABORATORY TESTING: ICD-10-CM

## 2017-03-15 PROBLEM — J18.9 COMMUNITY ACQUIRED PNEUMONIA: Status: RESOLVED | Noted: 2017-03-03 | Resolved: 2017-03-15

## 2017-03-15 PROCEDURE — 99203 OFFICE O/P NEW LOW 30 MIN: CPT

## 2017-03-15 RX ORDER — METHIMAZOLE 5 MG/1
5 TABLET ORAL DAILY
Qty: 90 TABLET | Refills: 0 | Status: SHIPPED | OUTPATIENT
Start: 2017-03-15 | End: 2017-07-19

## 2017-03-15 RX ORDER — DILTIAZEM HYDROCHLORIDE 120 MG/1
120 CAPSULE, COATED, EXTENDED RELEASE ORAL DAILY
Qty: 90 CAPSULE | Refills: 0 | Status: SHIPPED | OUTPATIENT
Start: 2017-03-15 | End: 2017-07-19

## 2017-03-15 RX ORDER — AMLODIPINE BESYLATE 5 MG/1
5 TABLET ORAL DAILY
Qty: 90 TABLET | Refills: 0 | Status: SHIPPED | OUTPATIENT
Start: 2017-03-15 | End: 2017-07-19

## 2017-03-15 RX ORDER — ASPIRIN 325 MG
325 TABLET ORAL DAILY
Qty: 90 TABLET | Refills: 0 | Status: SHIPPED | OUTPATIENT
Start: 2017-03-15 | End: 2018-01-18

## 2017-03-15 RX ORDER — CLOPIDOGREL BISULFATE 75 MG/1
75 TABLET ORAL DAILY
Qty: 90 TABLET | Refills: 0 | Status: SHIPPED | OUTPATIENT
Start: 2017-03-15 | End: 2017-07-19

## 2017-03-15 RX ORDER — ATORVASTATIN CALCIUM 10 MG/1
10 TABLET, FILM COATED ORAL NIGHTLY
Qty: 90 TABLET | Refills: 0 | Status: SHIPPED | OUTPATIENT
Start: 2017-03-15 | End: 2017-07-21

## 2017-03-16 ENCOUNTER — TELEPHONE (OUTPATIENT)
Dept: CARDIOLOGY CLINIC | Facility: HOSPITAL | Age: 72
End: 2017-03-16

## 2017-03-16 ENCOUNTER — TELEPHONE (OUTPATIENT)
Dept: FAMILY MEDICINE CLINIC | Facility: CLINIC | Age: 72
End: 2017-03-16

## 2017-03-16 NOTE — TELEPHONE ENCOUNTER
Per Madisyn's orders, to contact pt to reschedule appt from 4/3/17 at 8am to afternoon if possible. I called patient, no answer. LMTCB.

## 2017-03-16 NOTE — TELEPHONE ENCOUNTER
Pt returned call and stated that he would like to cancel appt since he has new pcp. Pt stated that he will call back if nessesary.

## 2017-03-16 NOTE — PATIENT INSTRUCTIONS
Stroke—Self-Care  Performing your routine tasks may be difficult after Darliss Cheadle had a stroke (brain attack). But many people can learn ways to manage their daily activities.  In fact, daily activities may help you to regain muscle strength and bring back fu © 2392-1618 [address], [address] [place]. All rights reserved. This information is not intended as a substitute for professional medical care. Always follow your healthcare professional's instructions.

## 2017-03-16 NOTE — PROGRESS NOTES
HPI:    Patient ID: Jocelyn Irene is a 66year old male. Diabetes  He presents for his follow-up diabetic visit. He has type 2 diabetes mellitus. Onset time: new onset. Pertinent negatives for hypoglycemia include no dizziness or headaches.  Associate Skin: Negative for rash. Neurological: Negative for dizziness, vertigo, syncope, light-headedness and headaches. All other systems reviewed and are negative.              Current Outpatient Prescriptions:  MetFORMIN HCl 500 MG Oral Tab Take 1 tablet ( obtain CMP, urine microalbumin/creatinine ratio, and Hgb A1c prior to next scheduled appointment. 2. Multinodular thyroid  Continue Methimazole as previously instructed. Will refer to  [facility] (Endocrinology) for evaluation.     3. Hypercholesteremia

## 2017-03-30 ENCOUNTER — TELEPHONE (OUTPATIENT)
Dept: FAMILY MEDICINE CLINIC | Facility: CLINIC | Age: 72
End: 2017-03-30

## 2017-03-30 PROBLEM — H53.469 CVA, OLD, HOMONYMOUS HEMIANOPSIA: Status: ACTIVE | Noted: 2017-03-24

## 2017-03-30 PROBLEM — I69.398 CVA, OLD, HOMONYMOUS HEMIANOPSIA: Status: ACTIVE | Noted: 2017-03-24

## 2017-03-30 NOTE — TELEPHONE ENCOUNTER
Opthalmology consult - Dr. Sandra Vargas. Diabetic flowsheet completed.   **right homonymous hemianopsia**

## 2017-04-20 ENCOUNTER — MED REC SCAN ONLY (OUTPATIENT)
Dept: FAMILY MEDICINE CLINIC | Facility: CLINIC | Age: 72
End: 2017-04-20

## 2017-07-13 ENCOUNTER — MA CHART PREP (OUTPATIENT)
Dept: FAMILY MEDICINE CLINIC | Facility: CLINIC | Age: 72
End: 2017-07-13

## 2017-07-13 PROBLEM — Z79.01 CURRENT USE OF LONG TERM ANTICOAGULATION: Status: ACTIVE | Noted: 2017-07-13

## 2017-07-19 ENCOUNTER — OFFICE VISIT (OUTPATIENT)
Dept: FAMILY MEDICINE CLINIC | Facility: CLINIC | Age: 72
End: 2017-07-19

## 2017-07-19 VITALS
BODY MASS INDEX: 28.89 KG/M2 | HEART RATE: 82 BPM | SYSTOLIC BLOOD PRESSURE: 124 MMHG | HEIGHT: 62 IN | DIASTOLIC BLOOD PRESSURE: 70 MMHG | WEIGHT: 157 LBS | OXYGEN SATURATION: 96 %

## 2017-07-19 DIAGNOSIS — E66.3 OVERWEIGHT: ICD-10-CM

## 2017-07-19 DIAGNOSIS — E78.00 HYPERCHOLESTEREMIA: ICD-10-CM

## 2017-07-19 DIAGNOSIS — I69.398 HOMONYMOUS HEMIANOPSIA FOLLOWING CEREBROVASCULAR ACCIDENT: ICD-10-CM

## 2017-07-19 DIAGNOSIS — H53.469 HOMONYMOUS HEMIANOPSIA FOLLOWING CEREBROVASCULAR ACCIDENT: ICD-10-CM

## 2017-07-19 DIAGNOSIS — I10 ESSENTIAL HYPERTENSION: ICD-10-CM

## 2017-07-19 DIAGNOSIS — I48.0 PAF (PAROXYSMAL ATRIAL FIBRILLATION) (HCC): Chronic | ICD-10-CM

## 2017-07-19 DIAGNOSIS — Z00.00 ENCOUNTER FOR MEDICARE ANNUAL WELLNESS EXAM: Primary | ICD-10-CM

## 2017-07-19 DIAGNOSIS — Z86.010 PERSONAL HISTORY OF COLONIC POLYPS: ICD-10-CM

## 2017-07-19 DIAGNOSIS — F17.200 TOBACCO USE DISORDER: ICD-10-CM

## 2017-07-19 DIAGNOSIS — IMO0001 UNCONTROLLED TYPE 2 DIABETES MELLITUS WITHOUT COMPLICATION, WITHOUT LONG-TERM CURRENT USE OF INSULIN: ICD-10-CM

## 2017-07-19 DIAGNOSIS — E04.2 MULTINODULAR THYROID: ICD-10-CM

## 2017-07-19 DIAGNOSIS — Z13.31 DEPRESSION SCREENING: ICD-10-CM

## 2017-07-19 DIAGNOSIS — I69.30 HISTORY OF STROKE WITH RESIDUAL DEFICIT: ICD-10-CM

## 2017-07-19 DIAGNOSIS — Z28.21 IMMUNIZATION NOT CARRIED OUT BECAUSE OF PATIENT REFUSAL: ICD-10-CM

## 2017-07-19 DIAGNOSIS — I47.1 PSVT (PAROXYSMAL SUPRAVENTRICULAR TACHYCARDIA) (HCC): ICD-10-CM

## 2017-07-19 PROCEDURE — 96160 PT-FOCUSED HLTH RISK ASSMT: CPT

## 2017-07-19 RX ORDER — DILTIAZEM HYDROCHLORIDE 120 MG/1
120 CAPSULE, COATED, EXTENDED RELEASE ORAL DAILY
Qty: 90 CAPSULE | Refills: 0 | Status: SHIPPED | OUTPATIENT
Start: 2017-07-19 | End: 2017-10-18

## 2017-07-19 RX ORDER — CLOPIDOGREL BISULFATE 75 MG/1
75 TABLET ORAL DAILY
Qty: 90 TABLET | Refills: 3 | Status: SHIPPED | OUTPATIENT
Start: 2017-07-19 | End: 2018-01-18

## 2017-07-19 RX ORDER — METHIMAZOLE 5 MG/1
5 TABLET ORAL DAILY
Qty: 90 TABLET | Refills: 0 | Status: SHIPPED | OUTPATIENT
Start: 2017-07-19 | End: 2017-10-18

## 2017-07-19 RX ORDER — AMLODIPINE BESYLATE 5 MG/1
5 TABLET ORAL DAILY
Qty: 90 TABLET | Refills: 0 | Status: SHIPPED | OUTPATIENT
Start: 2017-07-19 | End: 2017-10-18

## 2017-07-19 RX ORDER — TRIAMTERENE AND HYDROCHLOROTHIAZIDE 37.5; 25 MG/1; MG/1
2 CAPSULE ORAL EVERY MORNING
COMMUNITY
End: 2017-07-19

## 2017-07-19 RX ORDER — TRIAMTERENE AND HYDROCHLOROTHIAZIDE 37.5; 25 MG/1; MG/1
2 CAPSULE ORAL EVERY MORNING
Qty: 180 CAPSULE | Refills: 0 | Status: SHIPPED | OUTPATIENT
Start: 2017-07-19 | End: 2017-10-18

## 2017-07-20 ENCOUNTER — APPOINTMENT (OUTPATIENT)
Dept: LAB | Facility: HOSPITAL | Age: 72
End: 2017-07-20
Payer: MEDICARE

## 2017-07-20 DIAGNOSIS — Z01.89 ENCOUNTER FOR ROUTINE LABORATORY TESTING: ICD-10-CM

## 2017-07-20 PROBLEM — I69.398 HOMONYMOUS HEMIANOPSIA FOLLOWING CEREBROVASCULAR ACCIDENT: Status: ACTIVE | Noted: 2017-03-24

## 2017-07-20 PROBLEM — H53.469 HOMONYMOUS HEMIANOPSIA FOLLOWING CEREBROVASCULAR ACCIDENT: Status: ACTIVE | Noted: 2017-03-24

## 2017-07-20 LAB
ALBUMIN SERPL BCP-MCNC: 3.9 G/DL (ref 3.5–4.8)
ALBUMIN/GLOB SERPL: 1.5 {RATIO} (ref 1–2)
ALP SERPL-CCNC: 95 U/L (ref 32–100)
ALT SERPL-CCNC: 23 U/L (ref 17–63)
ANION GAP SERPL CALC-SCNC: 13 MMOL/L (ref 0–18)
AST SERPL-CCNC: 29 U/L (ref 15–41)
BILIRUB SERPL-MCNC: 0.6 MG/DL (ref 0.3–1.2)
BILIRUB UR QL: NEGATIVE
BUN SERPL-MCNC: 9 MG/DL (ref 8–20)
BUN/CREAT SERPL: 8.9 (ref 10–20)
CALCIUM SERPL-MCNC: 9.5 MG/DL (ref 8.5–10.5)
CHLORIDE SERPL-SCNC: 99 MMOL/L (ref 95–110)
CO2 SERPL-SCNC: 28 MMOL/L (ref 22–32)
COLOR UR: YELLOW
CREAT SERPL-MCNC: 1.01 MG/DL (ref 0.5–1.5)
CREAT UR-MCNC: 256.8 MG/DL
ERYTHROCYTE [DISTWIDTH] IN BLOOD BY AUTOMATED COUNT: 15.8 % (ref 11–15)
GLOBULIN PLAS-MCNC: 2.6 G/DL (ref 2.5–3.7)
GLUCOSE SERPL-MCNC: 160 MG/DL (ref 70–99)
GLUCOSE UR-MCNC: NEGATIVE MG/DL
HBA1C MFR BLD: 7.6 % (ref 4–6)
HCT VFR BLD AUTO: 42.4 % (ref 41–52)
HGB BLD-MCNC: 14.4 G/DL (ref 13.5–17.5)
HGB UR QL STRIP.AUTO: NEGATIVE
KETONES UR-MCNC: NEGATIVE MG/DL
MCH RBC QN AUTO: 30.2 PG (ref 27–32)
MCHC RBC AUTO-ENTMCNC: 33.9 G/DL (ref 32–37)
MCV RBC AUTO: 89.1 FL (ref 80–100)
MICROALBUMIN UR-MCNC: 0.9 MG/DL (ref 0–1.8)
MICROALBUMIN/CREAT UR: 3.5 MG/G{CREAT} (ref 0–20)
NITRITE UR QL STRIP.AUTO: NEGATIVE
OSMOLALITY UR CALC.SUM OF ELEC: 292 MOSM/KG (ref 275–295)
PH UR: 5 [PH] (ref 5–8)
PLATELET # BLD AUTO: 297 K/UL (ref 140–400)
PMV BLD AUTO: 8.1 FL (ref 7.4–10.3)
POTASSIUM SERPL-SCNC: 3.7 MMOL/L (ref 3.3–5.1)
PROT SERPL-MCNC: 6.5 G/DL (ref 5.9–8.4)
PROT UR-MCNC: NEGATIVE MG/DL
PSA SERPL-MCNC: 11.6 NG/ML (ref 0–4)
RBC # BLD AUTO: 4.76 M/UL (ref 4.5–5.9)
RBC #/AREA URNS AUTO: 2 /HPF
SODIUM SERPL-SCNC: 140 MMOL/L (ref 136–144)
SP GR UR STRIP: 1.01 (ref 1–1.03)
UROBILINOGEN UR STRIP-ACNC: <2
VIT C UR-MCNC: NEGATIVE MG/DL
WBC # BLD AUTO: 5.7 K/UL (ref 4–11)
WBC #/AREA URNS AUTO: 9 /HPF

## 2017-07-20 PROCEDURE — 85027 COMPLETE CBC AUTOMATED: CPT

## 2017-07-20 PROCEDURE — 36415 COLL VENOUS BLD VENIPUNCTURE: CPT

## 2017-07-20 NOTE — PROGRESS NOTES
HPI:   Sergio Agarwal is a 67year old male who presents for a MA (Medicare Advantage) [facility] (Once per calendar year). Pt denies any complaints at this time.        Patient Care Team: Patient Care Team:  Hayes Dejesus MD as PCP - General (Family Coated Beads (CARTIA XT) 120 MG Oral Capsule SR 24 Hr Take 1 capsule (120 mg total) by mouth daily.    MetFORMIN HCl 500 MG Oral Tab Take 1 tablet (500 mg total) by mouth daily with breakfast.   Atorvastatin Calcium 10 MG Oral Tab Take 1 tablet (10 mg total Screening Method:  Finger Rub   Finger Rub Result:  Pass                  Visual Acuity  Right Eye Visual Acuity: Corrected Right Eye Chart Acuity: 20/25   Left Eye Visual Acuity: Corrected Left Eye Chart Acuity: 20/25   Both Eyes Visual Acuity: Correcte CONDITIONS:   Zaid Garcia is a 67year old male who presents for a Medicare Assessment. PLAN SUMMARY:   Diagnoses and all orders for this visit:    Encounter for Medicare annual wellness exam  -     Pt reassured and all questions answered.   -  Age residual deficit  Homonymous hemianopsia following cerebrovascular accident  - Continue ASA as previously instructed. -     Clopidogrel Bisulfate 75 MG Oral Tab;  Take 1 tablet (75 mg total) by mouth daily.  - Follow up with Dr. Sulema Ochoa (Ophthalmology) as immunizations at another office such as Influenza, Hepatitis B, Tetanus, or Pneumococcal?: No     Functional Ability     Bathing or Showering: Able without help    Toileting: Able without help         Eating: Able without help    Driving: Able without help for quick review of chart, separate sheet to patient  [address] Internal Lab or Procedure External Lab or Procedure   Diabetes Screening      HbgA1C   Annually Glycohemoglobin (HgA1c) (%)   Date Value   03/04/2017 7.5 flowsheet data found. Creatinine  Annually Creatinine (mg/dL)   Date Value   03/07/2017 0.84    No flowsheet data found. Drug Serum Conc  Annually No results found for: DIGOXIN, DIG, VALP No flowsheet data found.     Diabetes      HgbA1C  Annually Gly

## 2017-07-20 NOTE — PATIENT INSTRUCTIONS
Roseline Kumari's SCREENING SCHEDULE   Tests on this list are recommended by your physician but may not be covered, or covered at this frequency, by your insurer. Please check with your insurance carrier before scheduling to verify coverage.     [employer] Colonoscopy Screen   Covered every 10 years- more often if abnormal Colonoscopy,10 Years due on 03/26/1994 Update Health Maintenance if applicable    Flex Sigmoidoscopy Screen  Covered every 5 years No results found for this or any previous visit.  No flows or any previous visit. This may be covered with your prescription benefits, but Medicare does not cover unless Medically needed    Zoster (Not covered by Medicare Part B) No orders found for this or any previous visit.  This may be covered with your pharmac

## 2017-07-21 ENCOUNTER — TELEPHONE (OUTPATIENT)
Dept: FAMILY MEDICINE CLINIC | Facility: CLINIC | Age: 72
End: 2017-07-21

## 2017-07-21 DIAGNOSIS — E78.00 HYPERCHOLESTEREMIA: ICD-10-CM

## 2017-07-21 DIAGNOSIS — R97.20 ELEVATED PSA: Primary | ICD-10-CM

## 2017-07-21 DIAGNOSIS — IMO0001 UNCONTROLLED TYPE 2 DIABETES MELLITUS WITHOUT COMPLICATION, WITHOUT LONG-TERM CURRENT USE OF INSULIN: ICD-10-CM

## 2017-07-21 DIAGNOSIS — E78.2 MIXED HYPERLIPIDEMIA: Primary | ICD-10-CM

## 2017-07-21 LAB
CHOLEST SERPL-MCNC: 188 MG/DL (ref 110–200)
HDLC SERPL-MCNC: 39 MG/DL
LDLC SERPL CALC-MCNC: 118 MG/DL (ref 0–99)
NONHDLC SERPL-MCNC: 149 MG/DL
TRIGL SERPL-MCNC: 153 MG/DL (ref 1–149)

## 2017-07-21 RX ORDER — ATORVASTATIN CALCIUM 20 MG/1
20 TABLET, FILM COATED ORAL NIGHTLY
Qty: 90 TABLET | Refills: 0 | Status: SHIPPED | OUTPATIENT
Start: 2017-07-21 | End: 2017-10-21

## 2017-07-21 NOTE — TELEPHONE ENCOUNTER
Results were discussed with patient and will be getting his new Rx from the pharmacy and will stop by the office on Tuesday to  his referral to go see urologist.

## 2017-07-21 NOTE — TELEPHONE ENCOUNTER
----- Message from Radha Hoff MD sent at 7/21/2017  2:24 AM CDT -----  Change Metformin to 500mg PO BID, medication e-prescribed; needs repeat BW (CMP, Hgb A1c) in 3 months prior to next f/u appointment; ok to file.     Notes Recorded by Jennifer Tomlinson

## 2017-08-01 ENCOUNTER — HOSPITAL ENCOUNTER (OUTPATIENT)
Dept: ULTRASOUND IMAGING | Facility: HOSPITAL | Age: 72
Discharge: HOME | End: 2017-08-01
Payer: MEDICARE

## 2017-08-01 DIAGNOSIS — F17.200 TOBACCO USE DISORDER: ICD-10-CM

## 2017-08-01 PROCEDURE — 76706 US ABDL AORTA SCREEN AAA: CPT

## 2017-08-09 ENCOUNTER — TELEPHONE (OUTPATIENT)
Dept: FAMILY MEDICINE CLINIC | Facility: CLINIC | Age: 72
End: 2017-08-09

## 2017-10-04 ENCOUNTER — APPOINTMENT (OUTPATIENT)
Dept: LAB | Facility: HOSPITAL | Age: 72
End: 2017-10-04
Attending: UROLOGY
Payer: MEDICARE

## 2017-10-04 ENCOUNTER — OFFICE VISIT (OUTPATIENT)
Dept: SURGERY | Facility: CLINIC | Age: 72
End: 2017-10-04

## 2017-10-04 VITALS
DIASTOLIC BLOOD PRESSURE: 69 MMHG | RESPIRATION RATE: 16 BRPM | BODY MASS INDEX: 25.61 KG/M2 | WEIGHT: 150 LBS | HEIGHT: 64 IN | HEART RATE: 78 BPM | TEMPERATURE: 99 F | SYSTOLIC BLOOD PRESSURE: 137 MMHG

## 2017-10-04 DIAGNOSIS — F17.210 SMOKING GREATER THAN 40 PACK YEARS: ICD-10-CM

## 2017-10-04 DIAGNOSIS — R35.1 NOCTURIA: ICD-10-CM

## 2017-10-04 DIAGNOSIS — R35.0 BENIGN PROSTATIC HYPERPLASIA WITH URINARY FREQUENCY: ICD-10-CM

## 2017-10-04 DIAGNOSIS — I69.30 HISTORY OF CVA WITH RESIDUAL DEFICIT: ICD-10-CM

## 2017-10-04 DIAGNOSIS — I48.91 ATRIAL FIBRILLATION, UNSPECIFIED TYPE (HCC): ICD-10-CM

## 2017-10-04 DIAGNOSIS — R97.20 ELEVATED PSA: Primary | ICD-10-CM

## 2017-10-04 DIAGNOSIS — R97.20 ELEVATED PSA: ICD-10-CM

## 2017-10-04 DIAGNOSIS — N40.1 BENIGN PROSTATIC HYPERPLASIA WITH URINARY FREQUENCY: ICD-10-CM

## 2017-10-04 DIAGNOSIS — Z79.82 ASPIRIN LONG-TERM USE: ICD-10-CM

## 2017-10-04 DIAGNOSIS — Z79.02 PLATELET INHIBITION DUE TO PLAVIX: ICD-10-CM

## 2017-10-04 DIAGNOSIS — N52.01 ERECTILE DYSFUNCTION DUE TO ARTERIAL INSUFFICIENCY: ICD-10-CM

## 2017-10-04 PROCEDURE — 36415 COLL VENOUS BLD VENIPUNCTURE: CPT

## 2017-10-04 PROCEDURE — G0463 HOSPITAL OUTPT CLINIC VISIT: HCPCS | Performed by: UROLOGY

## 2017-10-04 PROCEDURE — 99204 OFFICE O/P NEW MOD 45 MIN: CPT | Performed by: UROLOGY

## 2017-10-04 PROCEDURE — 84153 ASSAY OF PSA TOTAL: CPT

## 2017-10-04 RX ORDER — TAMSULOSIN HYDROCHLORIDE 0.4 MG/1
0.4 CAPSULE ORAL DAILY
Qty: 90 CAPSULE | Refills: 3 | Status: SHIPPED | OUTPATIENT
Start: 2017-10-04 | End: 2018-12-18

## 2017-10-04 NOTE — PATIENT INSTRUCTIONS
1.   Blood draw for PSA--total and free today to investigate elevated PSA    2.  Start tamsulosin 0.4 mg; although prescription says to take it daily, start out taking 1 capsule every other day to make sure that the medication does not cause any significan bladder may also weaken as you age. It may not empty completely after you urinate.   Men with BPH may have these symptoms:  · The urge to frequently urinate, especially at night  · Leaking or dribbling of urine  · A weak stream of urine  · Not able to [place] get worse as the prostate grows. So at some point you may need treatment. Your provider may prescribe medicine to shrink the prostate or stop its growth. Other treatments can make the urethra wider to let urine to flow more easily.  There are also some mini

## 2017-10-04 NOTE — PROGRESS NOTES
Mary Morel is a 67year old male. HPI:   Patient presents with:  elevated psa: Patient present for consult for elevated PSA          History provided by pt.     Elevated PSA   Denies associated symptoms to suggest prostate cancer such as weight los • Essential hypertension    • HTN (hypertension), benign 3/9/2017   • PSVT (paroxysmal supraventricular tachycardia) ([facility] [address] 3/9/2017    S/p SVT ablation 10-31-17    • Tobacco use disorder 3/9/2017    55+ pack year history       Past Surgical History:  @1997 Neurological:  Positive for difficulty gait disturbance and memory loss.     Endocrine:  Negative for abnormal sleep patterns, increased activity, polydipsia and polyphagia    Allergic/Immuno:  Negative for environmental allergies and food allergies  Cardio STOOL IMPACTION none  PROSTATE >4+enlarged, >75 g, no nodules or indurations, base is barely palpable  Skin/Hair: no unusual rashes present no abnormal bruising noted ; abdominal scars midline on the abdomen  Back/Spine: no abnormalities noted  Extremities On DAYANARA, prostate >4+enlarged, >75 g, no nodules or indurations, base is barely palpable.  Discussed starting long term finasteride or avodart however this would skew the results of the PSA vs greenlight laser ablation in which aspirin and plavix would need (I69.30) History of CVA with residual deficit  Patient is at increased risk for future procedures and Aspirin   would have to be held prior to any procedures.    (N52.01) Erectile dysfunction due to arterial insufficiency  Chronic problem; pt feels this is 5.   For now you have decided not to undergo prostate biopsy, which carries risk because of your need to be on blood thinning medication and also because of her history of stroke; you have decided to have the PSA followed to look for trends; if and when we

## 2017-10-08 DIAGNOSIS — R97.20 ELEVATED PSA: Primary | ICD-10-CM

## 2017-10-11 ENCOUNTER — TELEPHONE (OUTPATIENT)
Dept: SURGERY | Facility: CLINIC | Age: 72
End: 2017-10-11

## 2017-10-11 NOTE — TELEPHONE ENCOUNTER
Patient is aware of his test results and verbalized understanding. Patient reschedule visit for Dec 4. All questions answered. Patient states he will comply.

## 2017-10-11 NOTE — TELEPHONE ENCOUNTER
----- Message from Janna Alcocer MD sent at 10/8/2017  4:45 PM CDT -----  Please call patient. 10/4/17 PSA 14.4, elevated and worse than 2 months prior when it was 11.6 so that PSA is rising.   Please asked patient to change plans and cancel visit for

## 2017-10-18 ENCOUNTER — OFFICE VISIT (OUTPATIENT)
Dept: FAMILY MEDICINE CLINIC | Facility: CLINIC | Age: 72
End: 2017-10-18

## 2017-10-18 VITALS
DIASTOLIC BLOOD PRESSURE: 80 MMHG | OXYGEN SATURATION: 97 % | HEART RATE: 73 BPM | SYSTOLIC BLOOD PRESSURE: 114 MMHG | BODY MASS INDEX: 20.98 KG/M2 | HEIGHT: 62 IN | WEIGHT: 114 LBS

## 2017-10-18 DIAGNOSIS — Z28.21 IMMUNIZATION NOT CARRIED OUT BECAUSE OF PATIENT REFUSAL: ICD-10-CM

## 2017-10-18 DIAGNOSIS — IMO0001 UNCONTROLLED TYPE 2 DIABETES MELLITUS WITHOUT COMPLICATION, WITHOUT LONG-TERM CURRENT USE OF INSULIN: Primary | ICD-10-CM

## 2017-10-18 DIAGNOSIS — E04.2 MULTINODULAR THYROID: ICD-10-CM

## 2017-10-18 DIAGNOSIS — I10 ESSENTIAL HYPERTENSION: ICD-10-CM

## 2017-10-18 DIAGNOSIS — I48.0 PAF (PAROXYSMAL ATRIAL FIBRILLATION) (HCC): ICD-10-CM

## 2017-10-18 DIAGNOSIS — I47.1 PSVT (PAROXYSMAL SUPRAVENTRICULAR TACHYCARDIA) (HCC): ICD-10-CM

## 2017-10-18 DIAGNOSIS — E78.2 MIXED HYPERLIPIDEMIA: ICD-10-CM

## 2017-10-18 PROCEDURE — 99214 OFFICE O/P EST MOD 30 MIN: CPT

## 2017-10-18 RX ORDER — DILTIAZEM HYDROCHLORIDE 120 MG/1
120 CAPSULE, COATED, EXTENDED RELEASE ORAL DAILY
Qty: 90 CAPSULE | Refills: 0 | Status: SHIPPED | OUTPATIENT
Start: 2017-10-18 | End: 2018-01-18

## 2017-10-18 RX ORDER — TRIAMTERENE AND HYDROCHLOROTHIAZIDE 37.5; 25 MG/1; MG/1
2 CAPSULE ORAL EVERY MORNING
Qty: 180 CAPSULE | Refills: 0 | Status: SHIPPED | OUTPATIENT
Start: 2017-10-18 | End: 2018-01-18

## 2017-10-18 RX ORDER — METHIMAZOLE 5 MG/1
5 TABLET ORAL DAILY
Qty: 90 TABLET | Refills: 0 | Status: SHIPPED | OUTPATIENT
Start: 2017-10-18 | End: 2018-01-20

## 2017-10-18 RX ORDER — AMLODIPINE BESYLATE 5 MG/1
5 TABLET ORAL DAILY
Qty: 90 TABLET | Refills: 0 | Status: SHIPPED | OUTPATIENT
Start: 2017-10-18 | End: 2018-01-18

## 2017-10-18 NOTE — PROGRESS NOTES
HPI:    Patient ID: Nils Fonseca is a 67year old male. Diabetes   He presents for his follow-up diabetic visit. He has type 2 diabetes mellitus. Onset time: few months. His disease course has been stable.  Pertinent negatives for hypoglycemia includ Negative for photophobia, discharge and visual disturbance. Respiratory: Negative for cough and shortness of breath. Cardiovascular: Negative for chest pain, palpitations, orthopnea and PND.    Gastrointestinal: Negative for abdominal pain, change in b well-nourished. No distress. Cardiovascular: Normal rate, regular rhythm and normal heart sounds. Pulmonary/Chest: Effort normal and breath sounds normal. No respiratory distress. Neurological: He is alert and oriented to person, place, and time.

## 2017-10-18 NOTE — PATIENT INSTRUCTIONS
Resources for People with Diabetes  Living with diabetes means making many changes in your life, and these changes may seem overwhelming. That’s a normal reaction. When you feel down, reach out to your family and friends.  Your healthcare team is also the · Academy of Nutrition and Dietetics  www.eatright. org  · Juvenile Diabetes [address]  www.jdrf. org   Talk with your healthcare provider if you’re feeling helpless or hopeless or are having trouble sleeping or eating.  These may be symptoms of dep

## 2017-10-20 DIAGNOSIS — I10 ESSENTIAL HYPERTENSION: ICD-10-CM

## 2017-10-20 RX ORDER — AMLODIPINE BESYLATE 5 MG/1
TABLET ORAL
Qty: 90 TABLET | Refills: 0 | OUTPATIENT
Start: 2017-10-20

## 2017-10-21 DIAGNOSIS — IMO0001 UNCONTROLLED TYPE 2 DIABETES MELLITUS WITHOUT COMPLICATION, WITHOUT LONG-TERM CURRENT USE OF INSULIN: ICD-10-CM

## 2017-10-21 DIAGNOSIS — E78.2 MIXED HYPERLIPIDEMIA: ICD-10-CM

## 2017-10-21 RX ORDER — ATORVASTATIN CALCIUM 20 MG/1
20 TABLET, FILM COATED ORAL NIGHTLY
Qty: 90 TABLET | Refills: 2 | Status: SHIPPED | OUTPATIENT
Start: 2017-10-21 | End: 2018-01-20

## 2017-11-02 DIAGNOSIS — E78.2 MIXED HYPERLIPIDEMIA: ICD-10-CM

## 2017-11-02 RX ORDER — ATORVASTATIN CALCIUM 20 MG/1
TABLET, FILM COATED ORAL
Qty: 90 TABLET | Refills: 0 | OUTPATIENT
Start: 2017-11-02

## 2017-11-15 ENCOUNTER — TELEPHONE (OUTPATIENT)
Dept: FAMILY MEDICINE CLINIC | Facility: CLINIC | Age: 72
End: 2017-11-15

## 2017-11-15 NOTE — TELEPHONE ENCOUNTER
Per Dr. Fallon Goss, no changes to current medications. Medications e-rx on 10/21/17 - per patient he hasn't received notification from pharmacy yet; advised patient to call pharmacy to  the Metformin and Atorvastatin.   Patient aware to get blood work don

## 2017-11-20 ENCOUNTER — MED REC SCAN ONLY (OUTPATIENT)
Dept: FAMILY MEDICINE CLINIC | Facility: CLINIC | Age: 72
End: 2017-11-20

## 2017-12-04 ENCOUNTER — OFFICE VISIT (OUTPATIENT)
Dept: SURGERY | Facility: CLINIC | Age: 72
End: 2017-12-04

## 2017-12-04 VITALS
HEART RATE: 84 BPM | HEIGHT: 64 IN | BODY MASS INDEX: 23.9 KG/M2 | DIASTOLIC BLOOD PRESSURE: 70 MMHG | SYSTOLIC BLOOD PRESSURE: 136 MMHG | WEIGHT: 140 LBS | TEMPERATURE: 98 F | RESPIRATION RATE: 16 BRPM

## 2017-12-04 DIAGNOSIS — Z79.01 ON CLOPIDOGREL THERAPY: ICD-10-CM

## 2017-12-04 DIAGNOSIS — N40.1 BENIGN PROSTATIC HYPERPLASIA WITH URINARY FREQUENCY: ICD-10-CM

## 2017-12-04 DIAGNOSIS — I69.30 HISTORY OF CVA WITH RESIDUAL DEFICIT: ICD-10-CM

## 2017-12-04 DIAGNOSIS — N52.01 ERECTILE DYSFUNCTION DUE TO ARTERIAL INSUFFICIENCY: ICD-10-CM

## 2017-12-04 DIAGNOSIS — I48.91 ATRIAL FIBRILLATION, UNSPECIFIED TYPE (HCC): ICD-10-CM

## 2017-12-04 DIAGNOSIS — Z79.82 ASPIRIN LONG-TERM USE: ICD-10-CM

## 2017-12-04 DIAGNOSIS — R35.0 BENIGN PROSTATIC HYPERPLASIA WITH URINARY FREQUENCY: ICD-10-CM

## 2017-12-04 DIAGNOSIS — F17.210 SMOKING GREATER THAN 40 PACK YEARS: ICD-10-CM

## 2017-12-04 DIAGNOSIS — R97.20 ELEVATED PSA: Primary | ICD-10-CM

## 2017-12-04 DIAGNOSIS — R35.1 NOCTURIA: ICD-10-CM

## 2017-12-04 PROCEDURE — G0463 HOSPITAL OUTPT CLINIC VISIT: HCPCS | Performed by: UROLOGY

## 2017-12-04 PROCEDURE — 99214 OFFICE O/P EST MOD 30 MIN: CPT | Performed by: UROLOGY

## 2017-12-04 NOTE — PROGRESS NOTES
HPI:    Patient ID: Flora Aponte is a 67year old male. HPI     History provided by pt.     Elevated PSA   Denies associated symptoms to suggest prostate cancer such as weight loss or loss of appetite or bone pain and denies associated symptoms to [facility].  10/04/2017 office consult with me; patient denies any Hx of kidney stones or prostatitis, started smoking athe the age of 11, with a 62 pack year history,  prostate >4+enlarged, >75 g, no nodules or indurations, base is barely palpable; patient de times daily with meals. Disp: 180 tablet Rfl: 0   atorvastatin 20 MG Oral Tab Take 1 tablet (20 mg total) by mouth nightly. Disp: 90 tablet Rfl: 2   Triamterene-HCTZ 37.5-25 MG Oral Cap Take 2 capsules by mouth every morning.  Disp: 180 capsule Rfl: 0   AmL PSA = 14.4  7/20/2017 PSA = 11.6; UA RBC = 2; occult blood = negative; Creatinine 1.01; eGFR >60    UROLOGICAL IMAGING   No results found. ASSESSMENT/PLAN:   (R97.20) Elevated PSA  (primary encounter diagnosis)  10/11/2017 PSA = 14.4.  I explained t about holding medication prior to prostate biopsy.    (Z79.82) Aspirin long-term use  Patient is on Aspirin and at risk for future surgical procedures and will need to hold medication prior to any surgical procedure.  I discussed with the patient we will ne PSA--TOTAL AND FREE 1--7 DAYS       B E F O R E     THE VISIT WITH ME.  Next time if you come to the office without doing the blood test we would unfortunately have to cancel the visit and reschedule.     No sexual activity or sexual stimulation for 5 days

## 2017-12-04 NOTE — PATIENT INSTRUCTIONS
.  1.  Please get opinion from Dr. Levon Mcclain as to how safe it would be to stop your aspirin for 7 days and you are Plavix for 7 days in order to perform prostate biopsy; that is a treatment option that you are thinking about; stopping aspirin and Plavix could encouragement. If the smoker in your life finds it hard to quit, encourage them to keep trying. Over-the-counter medicines  Nicotine replacement therapy may make quitting easier.  Certain aids, such as the nicotine patch, gum, and lozenges, are available w [facility], [address]. All rights reserved. This information is not intended as a substitute for professional medical care. Always follow your healthcare professional's instructions.

## 2018-01-17 ENCOUNTER — OFFICE VISIT (OUTPATIENT)
Dept: FAMILY MEDICINE CLINIC | Facility: CLINIC | Age: 73
End: 2018-01-17

## 2018-01-17 VITALS
RESPIRATION RATE: 18 BRPM | BODY MASS INDEX: 25.48 KG/M2 | WEIGHT: 142 LBS | HEIGHT: 62.5 IN | SYSTOLIC BLOOD PRESSURE: 104 MMHG | OXYGEN SATURATION: 97 % | HEART RATE: 70 BPM | DIASTOLIC BLOOD PRESSURE: 62 MMHG

## 2018-01-17 DIAGNOSIS — I69.398 HOMONYMOUS HEMIANOPSIA FOLLOWING CEREBROVASCULAR ACCIDENT: ICD-10-CM

## 2018-01-17 DIAGNOSIS — E66.3 OVERWEIGHT: ICD-10-CM

## 2018-01-17 DIAGNOSIS — H53.469 HOMONYMOUS HEMIANOPSIA FOLLOWING CEREBROVASCULAR ACCIDENT: ICD-10-CM

## 2018-01-17 DIAGNOSIS — Z00.00 ENCOUNTER FOR MEDICARE ANNUAL WELLNESS EXAM: Primary | ICD-10-CM

## 2018-01-17 DIAGNOSIS — Z28.21 IMMUNIZATION NOT CARRIED OUT BECAUSE OF PATIENT REFUSAL: ICD-10-CM

## 2018-01-17 DIAGNOSIS — Z86.010 PERSONAL HISTORY OF COLONIC POLYPS: ICD-10-CM

## 2018-01-17 DIAGNOSIS — E78.2 MIXED HYPERLIPIDEMIA: ICD-10-CM

## 2018-01-17 DIAGNOSIS — I10 ESSENTIAL HYPERTENSION: ICD-10-CM

## 2018-01-17 DIAGNOSIS — Z13.31 DEPRESSION SCREENING: ICD-10-CM

## 2018-01-17 DIAGNOSIS — Z12.11 SCREENING FOR COLORECTAL CANCER: ICD-10-CM

## 2018-01-17 DIAGNOSIS — Z12.12 SCREENING FOR COLORECTAL CANCER: ICD-10-CM

## 2018-01-17 DIAGNOSIS — E04.2 MULTINODULAR THYROID: ICD-10-CM

## 2018-01-17 DIAGNOSIS — N40.1 BENIGN PROSTATIC HYPERPLASIA WITH NOCTURIA: ICD-10-CM

## 2018-01-17 DIAGNOSIS — I47.1 PSVT (PAROXYSMAL SUPRAVENTRICULAR TACHYCARDIA) (HCC): ICD-10-CM

## 2018-01-17 DIAGNOSIS — R97.20 ELEVATED PSA: ICD-10-CM

## 2018-01-17 DIAGNOSIS — I69.30 HISTORY OF STROKE WITH RESIDUAL DEFICIT: ICD-10-CM

## 2018-01-17 DIAGNOSIS — R35.1 BENIGN PROSTATIC HYPERPLASIA WITH NOCTURIA: ICD-10-CM

## 2018-01-17 DIAGNOSIS — I48.0 PAF (PAROXYSMAL ATRIAL FIBRILLATION) (HCC): ICD-10-CM

## 2018-01-17 DIAGNOSIS — E11.9 CONTROLLED TYPE 2 DIABETES MELLITUS WITHOUT COMPLICATION, WITHOUT LONG-TERM CURRENT USE OF INSULIN (HCC): ICD-10-CM

## 2018-01-17 LAB
ALBUMIN SERPL BCP-MCNC: 3.7 G/DL (ref 3.5–4.8)
ALBUMIN/GLOB SERPL: 1.5 {RATIO} (ref 1–2)
ALP SERPL-CCNC: 73 U/L (ref 32–100)
ALT SERPL-CCNC: 16 U/L (ref 17–63)
ANION GAP SERPL CALC-SCNC: 11 MMOL/L (ref 0–18)
AST SERPL-CCNC: 17 U/L (ref 15–41)
BILIRUB SERPL-MCNC: 0.2 MG/DL (ref 0.3–1.2)
BUN SERPL-MCNC: 12 MG/DL (ref 8–20)
BUN/CREAT SERPL: 10.5 (ref 10–20)
CALCIUM SERPL-MCNC: 9.3 MG/DL (ref 8.5–10.5)
CHLORIDE SERPL-SCNC: 102 MMOL/L (ref 95–110)
CO2 SERPL-SCNC: 25 MMOL/L (ref 22–32)
CREAT SERPL-MCNC: 1.14 MG/DL (ref 0.5–1.5)
GLOBULIN PLAS-MCNC: 2.5 G/DL (ref 2.5–3.7)
GLUCOSE SERPL-MCNC: 108 MG/DL (ref 70–99)
OSMOLALITY UR CALC.SUM OF ELEC: 286 MOSM/KG (ref 275–295)
POTASSIUM SERPL-SCNC: 4.5 MMOL/L (ref 3.3–5.1)
PROT SERPL-MCNC: 6.2 G/DL (ref 5.9–8.4)
PSA SERPL-MCNC: 17.8 NG/ML (ref 0–4)
SODIUM SERPL-SCNC: 138 MMOL/L (ref 136–144)
TSH SERPL-ACNC: 1.48 UIU/ML (ref 0.45–5.33)

## 2018-01-17 PROCEDURE — 84153 ASSAY OF PSA TOTAL: CPT | Performed by: UROLOGY

## 2018-01-17 PROCEDURE — 84443 ASSAY THYROID STIM HORMONE: CPT | Performed by: UROLOGY

## 2018-01-17 PROCEDURE — 80053 COMPREHEN METABOLIC PANEL: CPT

## 2018-01-17 PROCEDURE — 36415 COLL VENOUS BLD VENIPUNCTURE: CPT

## 2018-01-17 PROCEDURE — 83036 HEMOGLOBIN GLYCOSYLATED A1C: CPT

## 2018-01-17 PROCEDURE — 96160 PT-FOCUSED HLTH RISK ASSMT: CPT

## 2018-01-17 PROCEDURE — G0438 PPPS, INITIAL VISIT: HCPCS

## 2018-01-18 DIAGNOSIS — R97.20 RISING PSA LEVEL: ICD-10-CM

## 2018-01-18 DIAGNOSIS — R97.20 ELEVATED PSA: Primary | ICD-10-CM

## 2018-01-18 PROBLEM — N40.1 BENIGN PROSTATIC HYPERPLASIA WITH NOCTURIA: Status: ACTIVE | Noted: 2018-01-18

## 2018-01-18 PROBLEM — R35.1 BENIGN PROSTATIC HYPERPLASIA WITH NOCTURIA: Status: ACTIVE | Noted: 2018-01-18

## 2018-01-18 PROBLEM — Z12.11 SCREENING FOR COLORECTAL CANCER: Status: ACTIVE | Noted: 2017-03-15

## 2018-01-18 PROBLEM — Z12.12 SCREENING FOR COLORECTAL CANCER: Status: ACTIVE | Noted: 2017-03-15

## 2018-01-18 LAB — HBA1C MFR BLD: 6.1 % (ref 4–6)

## 2018-01-18 RX ORDER — TRIAMTERENE AND HYDROCHLOROTHIAZIDE 37.5; 25 MG/1; MG/1
2 CAPSULE ORAL EVERY MORNING
Qty: 180 CAPSULE | Refills: 0 | Status: SHIPPED | OUTPATIENT
Start: 2018-01-18 | End: 2018-07-18

## 2018-01-18 RX ORDER — ASPIRIN 325 MG
325 TABLET ORAL DAILY
Qty: 90 TABLET | Refills: 0 | Status: SHIPPED | OUTPATIENT
Start: 2018-01-18 | End: 2018-08-31

## 2018-01-18 RX ORDER — AMLODIPINE BESYLATE 5 MG/1
5 TABLET ORAL DAILY
Qty: 90 TABLET | Refills: 0 | Status: SHIPPED | OUTPATIENT
Start: 2018-01-18 | End: 2018-07-18

## 2018-01-18 RX ORDER — DILTIAZEM HYDROCHLORIDE 120 MG/1
120 CAPSULE, COATED, EXTENDED RELEASE ORAL DAILY
Qty: 90 CAPSULE | Refills: 0 | Status: SHIPPED | OUTPATIENT
Start: 2018-01-18 | End: 2018-06-13

## 2018-01-18 RX ORDER — CLOPIDOGREL BISULFATE 75 MG/1
75 TABLET ORAL DAILY
Qty: 90 TABLET | Refills: 3 | Status: SHIPPED | OUTPATIENT
Start: 2018-01-18 | End: 2019-01-18

## 2018-01-18 NOTE — PATIENT INSTRUCTIONS
Fidelia Kumari's SCREENING SCHEDULE   Tests on this list are recommended by your physician but may not be covered, or covered at this frequency, by your insurer. Please check with your insurance carrier before scheduling to verify coverage.     Sav Rasheed Colonoscopy Screen   Covered every 10 years- more often if abnormal There are no preventive care reminders to display for this patient.  Update Health Maintenance if applicable    Flex Sigmoidoscopy Screen  Covered every 5 years No results found for this or Part B) No orders found for this or any previous visit. This may be covered with your prescription benefits, but Medicare does not cover unless Medically needed    Zoster (Not covered by Medicare Part B) No orders found for this or any previous visit.  This

## 2018-01-18 NOTE — PROGRESS NOTES
HPI:   Shyam Campos is a 67year old male who presents for a MA (Medicare Advantage) [address] [facility] (Once per calendar year). Pt denies any complaints at this time.        Fall/Risk Assessment   He has been screened for Falls and is low risk: Fall/Risk Sc today.         Mr. Andreia Dean already takes aspirin and has it on his medication list.   CAGE Alcohol screening   Jeanette Madrid was screened for Alcohol abuse and had a score of 0 so is at low risk.      Patient Care Team: Patient Care Team:  Sharon Corral 5 MG Oral Tab Take 1 tablet (5 mg total) by mouth daily. Triamterene-HCTZ 37.5-25 MG Oral Cap Take 2 capsules by mouth every morning. DilTIAZem HCl ER Coated Beads (CARTIA XT) 120 MG Oral Capsule SR 24 Hr Take 1 capsule (120 mg total) by mouth daily. Location: Left arm, Patient Position: Sitting, Cuff Size: adult)   Pulse 70   Resp 18   Ht 62.5\"   Wt 142 lb   SpO2 97%   BMI 25.56 kg/m²   Estimated body mass index is 25.56 kg/m² as calculated from the following:    Height as of this encounter: 62.5\". Neurologic: Normal              Vaccination History   Immunization History   Administered Date(s) Administered   • None   Deferred Date(s) Deferred   • >=3 YRS TRI  MULTIDOSE VIAL (85522) FLU CLINIC 01/17/2018   • Pneumococcal (Prevnar 13) 07/19/2017, 01 MG Oral Capsule SR 24 Hr; Take 1 capsule (120 mg total) by mouth daily. -     Clopidogrel Bisulfate 75 MG Oral Tab; Take 1 tablet (75 mg total) by mouth daily. Mixed hyperlipidemia  -     Continue Atorvastatin as previously instructed.     History of st 10/19/2017 5.9       No flowsheet data found.     Fasting Blood Sugar (FSB)Annually   Glucose (mg/dL)   Date Value   01/17/2018 108 (H)   10/19/2017 124   ----------    Cardiovascular Disease Screening     LDL Annually LDL Cholesterol (mg/dL)   Date Value benefits, but Medicare does not cover unless Medically needed    Zoster   Not covered by Medicare Part B No vaccine history found This may be covered with your pharmacy  prescription benefits      SPECIFIC DISEASE MONITORING Internal Lab or Procedure Exter

## 2018-01-19 ENCOUNTER — TELEPHONE (OUTPATIENT)
Dept: SURGERY | Facility: CLINIC | Age: 73
End: 2018-01-19

## 2018-01-19 NOTE — TELEPHONE ENCOUNTER
----- Message from Aissatou Gannon MD sent at 1/18/2018  9:42 PM CST -----  Please call patient.   I saw him in the office last month; he was to get a blood draw for PSA 1--7 days before visit with me which is scheduled for end of February; instead, he ha

## 2018-01-20 DIAGNOSIS — E11.9 CONTROLLED TYPE 2 DIABETES MELLITUS WITHOUT COMPLICATION, WITHOUT LONG-TERM CURRENT USE OF INSULIN (HCC): Primary | ICD-10-CM

## 2018-01-20 DIAGNOSIS — E04.2 MULTINODULAR THYROID: ICD-10-CM

## 2018-01-20 DIAGNOSIS — E78.2 MIXED HYPERLIPIDEMIA: ICD-10-CM

## 2018-01-20 RX ORDER — METHIMAZOLE 5 MG/1
5 TABLET ORAL DAILY
Qty: 90 TABLET | Refills: 0 | Status: SHIPPED | OUTPATIENT
Start: 2018-01-20 | End: 2018-07-18

## 2018-01-20 RX ORDER — ATORVASTATIN CALCIUM 20 MG/1
20 TABLET, FILM COATED ORAL NIGHTLY
Qty: 90 TABLET | Refills: 2 | Status: SHIPPED | OUTPATIENT
Start: 2018-01-20 | End: 2018-07-18

## 2018-01-22 ENCOUNTER — TELEPHONE (OUTPATIENT)
Dept: FAMILY MEDICINE CLINIC | Facility: CLINIC | Age: 73
End: 2018-01-22

## 2018-01-22 NOTE — TELEPHONE ENCOUNTER
----- Message from Zaid Platt MD sent at 1/20/2018 11:33 AM CST -----  Continue Metformin 500mg PO BID, Atorvastatin 20mg PO QHS, and Methimazole 5mg PO daily, medications e-prescribed; needs repeat BW (CMP, Hgb A1c) in 3 months prior to next f/u appo

## 2018-01-25 NOTE — TELEPHONE ENCOUNTER
Patient contacted. Patient is aware of his 1/18 PSA result, Dr. Fallon Wooten' message/order and patient verbalized understanding. All questions answered.

## 2018-02-01 ENCOUNTER — TELEPHONE (OUTPATIENT)
Dept: FAMILY MEDICINE CLINIC | Facility: CLINIC | Age: 73
End: 2018-02-01

## 2018-02-01 NOTE — TELEPHONE ENCOUNTER
Pt called states the ecotrin 325 mg and the metformin  is making th pt dizzy. Pt would like to speak to the nurse.

## 2018-02-01 NOTE — TELEPHONE ENCOUNTER
Per Dr Richardson Rolling have patient continue taking same medications the same way he usually does, but to make sure he takes the Tamsulosin only at night, patient notified and verbalized understanding, he is also aware that if he is not better in a week or so to come

## 2018-02-05 ENCOUNTER — TELEPHONE (OUTPATIENT)
Dept: FAMILY MEDICINE CLINIC | Facility: CLINIC | Age: 73
End: 2018-02-05

## 2018-02-05 NOTE — TELEPHONE ENCOUNTER
Ophthalmology consult with Dr. Landrum Merlin. [facility]. Diabetic flowsheet completed.

## 2018-02-17 ENCOUNTER — APPOINTMENT (OUTPATIENT)
Dept: LAB | Facility: HOSPITAL | Age: 73
End: 2018-02-17
Attending: UROLOGY
Payer: MEDICARE

## 2018-02-17 DIAGNOSIS — R97.20 ELEVATED PSA: ICD-10-CM

## 2018-02-17 DIAGNOSIS — R97.20 RISING PSA LEVEL: ICD-10-CM

## 2018-02-17 LAB — PSA SERPL-MCNC: 15.9 NG/ML (ref 0–4)

## 2018-02-17 PROCEDURE — 36415 COLL VENOUS BLD VENIPUNCTURE: CPT

## 2018-02-17 PROCEDURE — 84153 ASSAY OF PSA TOTAL: CPT

## 2018-02-23 ENCOUNTER — OFFICE VISIT (OUTPATIENT)
Dept: SURGERY | Facility: CLINIC | Age: 73
End: 2018-02-23

## 2018-02-23 ENCOUNTER — TELEPHONE (OUTPATIENT)
Dept: SURGERY | Facility: CLINIC | Age: 73
End: 2018-02-23

## 2018-02-23 VITALS
HEART RATE: 59 BPM | BODY MASS INDEX: 25.12 KG/M2 | TEMPERATURE: 98 F | WEIGHT: 140 LBS | SYSTOLIC BLOOD PRESSURE: 115 MMHG | RESPIRATION RATE: 16 BRPM | HEIGHT: 62.5 IN | DIASTOLIC BLOOD PRESSURE: 52 MMHG

## 2018-02-23 DIAGNOSIS — R35.1 NOCTURIA: ICD-10-CM

## 2018-02-23 DIAGNOSIS — N52.01 ERECTILE DYSFUNCTION DUE TO ARTERIAL INSUFFICIENCY: ICD-10-CM

## 2018-02-23 DIAGNOSIS — N40.1 BENIGN PROSTATIC HYPERPLASIA WITH URINARY FREQUENCY: ICD-10-CM

## 2018-02-23 DIAGNOSIS — I69.30 HISTORY OF CVA WITH RESIDUAL DEFICIT: ICD-10-CM

## 2018-02-23 DIAGNOSIS — Z79.01 ON CLOPIDOGREL THERAPY: ICD-10-CM

## 2018-02-23 DIAGNOSIS — R97.20 ELEVATED PSA: Primary | ICD-10-CM

## 2018-02-23 DIAGNOSIS — I48.91 ATRIAL FIBRILLATION, UNSPECIFIED TYPE (HCC): ICD-10-CM

## 2018-02-23 DIAGNOSIS — R35.0 BENIGN PROSTATIC HYPERPLASIA WITH URINARY FREQUENCY: ICD-10-CM

## 2018-02-23 DIAGNOSIS — F17.210 SMOKING GREATER THAN 40 PACK YEARS: ICD-10-CM

## 2018-02-23 DIAGNOSIS — Z79.82 ASPIRIN LONG-TERM USE: ICD-10-CM

## 2018-02-23 PROCEDURE — G0463 HOSPITAL OUTPT CLINIC VISIT: HCPCS | Performed by: UROLOGY

## 2018-02-23 PROCEDURE — 99215 OFFICE O/P EST HI 40 MIN: CPT | Performed by: UROLOGY

## 2018-02-23 RX ORDER — TAMSULOSIN HYDROCHLORIDE 0.4 MG/1
CAPSULE ORAL
COMMUNITY
Start: 2018-02-01 | End: 2018-12-20

## 2018-02-23 RX ORDER — CIPROFLOXACIN 500 MG/1
TABLET, FILM COATED ORAL
Qty: 6 TABLET | Refills: 0 | Status: SHIPPED | OUTPATIENT
Start: 2018-02-23 | End: 2019-01-22 | Stop reason: ALTCHOICE

## 2018-02-23 RX ORDER — SULFAMETHOXAZOLE AND TRIMETHOPRIM 800; 160 MG/1; MG/1
TABLET ORAL
Qty: 6 TABLET | Refills: 0 | Status: SHIPPED | OUTPATIENT
Start: 2018-02-23 | End: 2018-10-18 | Stop reason: ALTCHOICE

## 2018-02-23 RX ORDER — ASPIRIN 325 MG
TABLET, DELAYED RELEASE (ENTERIC COATED) ORAL
Refills: 0 | COMMUNITY
Start: 2018-01-18 | End: 2018-02-23

## 2018-02-23 NOTE — PROGRESS NOTES
HPI:    Patient ID: Saira Ma is a 67year old male. HPI     History provided by pt.     Elevated PSA   Denies associated symptoms to suggest prostate cancer such as weight loss or loss of appetite or bone pain and denies associated symptoms to arias light of CVA recommend Xarelto (now has insurance and can take he says); consider stress test as outpatient; follow up with Dr. Santiago in 4 weeks. 07/19/2017 Dr. Doug Lakhani; PSA checked; came back elevated; referred to Urology.  10/04/2017 office consult perforation following polypectomy   Family History   Problem Relation Age of Onset   • Cancer Father      brain CA   • No Known Problems Mother       Smoking status: Current Every Day Smoker                                                   Packs/day: 1.00 pause   Pulmonary/Chest: Effort normal. No respiratory distress. Genitourinary:   Genitourinary Comments: Prostate >4+ enlarged, 75 g, no palpable nodules or indurations    Musculoskeletal: Normal range of motion.    Neurological: He is alert and oriented prostate biopsy; he has a Penecilin allergy and will need to be on Bactrim DS and Ciprofloxacin 500mg. I fully discussed with the patient preoperative procedures and post operative care--please see instructions below.  While seeing patient I dictated a mess dysfunction due to arterial insufficiency  Chronic problem; pt feels this is stable.  I explained to pt that smoking can increase the risk of ED and he should take measures to stop smoking.     (F17.210) Smoking greater than 40 pack years  Patient has a 62 Sig: Take 1 tablet twice daily for 3 days; start 1 day BEFORE the biopsy           Imaging & Referrals:  None       [id number]  By signing my name below, I, González Rushing,  attest that this documentation has been prepared under the direction and in the p

## 2018-02-23 NOTE — PATIENT INSTRUCTIONS
1. Proceed with plans for ultrasound-guided needle biopsy of prostate in the office    2. Stop aspirin 325 mg  SEVEN   days before biopsy and we will ask you not to take it until 1 day AFTER the biopsy    3.    Please stop NSAIDs such as Motrin, Advil, A

## 2018-02-23 NOTE — TELEPHONE ENCOUNTER
Doctor Andrea Marshall,  I am seeing patient Rene Doll in the office right now; persistently elevated PSAs; presently PSA 15.9; probability that Freddy Eugene has prostate cancer is 2 out of 3.   I recommend to Freddy Eugene that he undergo prostate biopsy and he a

## 2018-02-26 NOTE — TELEPHONE ENCOUNTER
Nurses, please call patient to inform that Dr. Christin Sullivan gave clearance for the following, so that we could perform patient's necessary prostate biopsy =    \"* Stop aspirin 325 mg  SEVEN   days before biopsy and we will ask you not to take it until 1 day AFTER

## 2018-03-08 NOTE — TELEPHONE ENCOUNTER
Spoke with pt and I informed him of al of the instructions and orders in PVK's msg below and also reviewed the ABX schd and the MOM and Fleets enema schx and told pt to please call the office if he has any more questions because I stress the importance of

## 2018-03-10 ENCOUNTER — LAB ENCOUNTER (OUTPATIENT)
Dept: LAB | Facility: HOSPITAL | Age: 73
End: 2018-03-10
Attending: INTERNAL MEDICINE
Payer: MEDICARE

## 2018-03-10 DIAGNOSIS — E05.00 GRAVES' DISEASE: Primary | ICD-10-CM

## 2018-03-10 LAB
T3FREE SERPL-MCNC: 3.23 PG/ML (ref 2.53–4.29)
T4 FREE SERPL-MCNC: 0.83 NG/DL (ref 0.58–1.64)
TSH SERPL-ACNC: 0.57 UIU/ML (ref 0.45–5.33)

## 2018-03-10 PROCEDURE — 84443 ASSAY THYROID STIM HORMONE: CPT

## 2018-03-10 PROCEDURE — 36415 COLL VENOUS BLD VENIPUNCTURE: CPT

## 2018-03-10 PROCEDURE — 84481 FREE ASSAY (FT-3): CPT

## 2018-03-10 PROCEDURE — 84439 ASSAY OF FREE THYROXINE: CPT

## 2018-03-14 ENCOUNTER — APPOINTMENT (OUTPATIENT)
Dept: NUCLEAR MEDICINE | Facility: HOSPITAL | Age: 73
End: 2018-03-14
Attending: INTERNAL MEDICINE
Payer: MEDICARE

## 2018-03-14 ENCOUNTER — HOSPITAL ENCOUNTER (OUTPATIENT)
Dept: NUCLEAR MEDICINE | Facility: HOSPITAL | Age: 73
Discharge: HOME | End: 2018-03-14
Attending: INTERNAL MEDICINE
Payer: MEDICARE

## 2018-03-14 DIAGNOSIS — Z86.39 H/O TOXIC MULTINODULAR GOITER: ICD-10-CM

## 2018-03-29 ENCOUNTER — HOSPITAL ENCOUNTER (OUTPATIENT)
Dept: NUCLEAR MEDICINE | Facility: HOSPITAL | Age: 73
Discharge: HOME | End: 2018-03-29
Attending: INTERNAL MEDICINE
Payer: MEDICARE

## 2018-03-29 PROCEDURE — 78014 THYROID IMAGING W/BLOOD FLOW: CPT | Performed by: INTERNAL MEDICINE

## 2018-04-16 ENCOUNTER — OFFICE VISIT (OUTPATIENT)
Dept: SURGERY | Facility: CLINIC | Age: 73
End: 2018-04-16

## 2018-04-16 VITALS
SYSTOLIC BLOOD PRESSURE: 120 MMHG | DIASTOLIC BLOOD PRESSURE: 60 MMHG | HEIGHT: 64 IN | RESPIRATION RATE: 16 BRPM | HEART RATE: 98 BPM | WEIGHT: 140 LBS | BODY MASS INDEX: 23.9 KG/M2

## 2018-04-16 DIAGNOSIS — R97.20 ELEVATED PSA: Primary | ICD-10-CM

## 2018-04-16 DIAGNOSIS — R97.20 ELEVATED PROSTATE SPECIFIC ANTIGEN (PSA): ICD-10-CM

## 2018-04-16 PROCEDURE — 76872 US TRANSRECTAL: CPT | Performed by: UROLOGY

## 2018-04-16 PROCEDURE — 55700 BIOPSY OF PROSTATE,NEEDLE/PUNCH: CPT | Performed by: UROLOGY

## 2018-04-16 PROCEDURE — 76942 ECHO GUIDE FOR BIOPSY: CPT | Performed by: UROLOGY

## 2018-04-16 NOTE — PATIENT INSTRUCTIONS
1. Finish your prescribed 3 day course of the 2 different antibiotics as directed. 2. No heavy lifting or strenuous physical activity for a few days. 3. No aspirin or NSAIDs for 24 hours.     4. If your urine is bloody, drink enough fluids to dilute o

## 2018-04-16 NOTE — PROGRESS NOTES
[address] Patient Status:  Outpatient    3/26/1945 MRN [id number]   Location [address] [place] [address] Attending [address] [place] Day # 0 PCP MD Jayy Gardner is a 75 y gauge spinal needle and using ultrasound guidance, I injected  1% xylocaine solution in each of the following 4 locations: Junction of the prostate and seminal vesicle at the right base; junction of the prostate and seminal vesicle at the left base; right

## 2018-04-18 ENCOUNTER — OFFICE VISIT (OUTPATIENT)
Dept: FAMILY MEDICINE CLINIC | Facility: CLINIC | Age: 73
End: 2018-04-18

## 2018-04-18 VITALS
BODY MASS INDEX: 25 KG/M2 | OXYGEN SATURATION: 97 % | HEART RATE: 77 BPM | DIASTOLIC BLOOD PRESSURE: 68 MMHG | WEIGHT: 146 LBS | SYSTOLIC BLOOD PRESSURE: 128 MMHG

## 2018-04-18 DIAGNOSIS — K59.00 CONSTIPATION, UNSPECIFIED CONSTIPATION TYPE: ICD-10-CM

## 2018-04-18 DIAGNOSIS — E11.9 CONTROLLED TYPE 2 DIABETES MELLITUS WITHOUT COMPLICATION, WITHOUT LONG-TERM CURRENT USE OF INSULIN (HCC): Primary | ICD-10-CM

## 2018-04-18 PROCEDURE — 80053 COMPREHEN METABOLIC PANEL: CPT

## 2018-04-18 PROCEDURE — 36415 COLL VENOUS BLD VENIPUNCTURE: CPT

## 2018-04-18 PROCEDURE — 83036 HEMOGLOBIN GLYCOSYLATED A1C: CPT

## 2018-04-18 PROCEDURE — 99213 OFFICE O/P EST LOW 20 MIN: CPT

## 2018-04-18 RX ORDER — AMOXICILLIN 250 MG
2 CAPSULE ORAL DAILY
Qty: 60 TABLET | Refills: 0 | Status: SHIPPED | OUTPATIENT
Start: 2018-04-18 | End: 2019-01-22 | Stop reason: ALTCHOICE

## 2018-04-18 NOTE — PROGRESS NOTES
HPI:    Patient ID: Parish Guerra is a 75year old male. Constipation   This is a new problem. Episode onset: 3 days ago. The problem is unchanged. Stool frequency: no stools since prostate bx. The patient is not on a high fiber diet.  He does not exe pain, rhinorrhea and sore throat. Eyes: Positive for blurred vision and visual disturbance. Negative for photophobia and discharge. Respiratory: Negative for cough and shortness of breath.     Cardiovascular: Negative for chest pain, palpitations, orth mg total) by mouth daily. Disp: 90 capsule Rfl: 0   Clopidogrel Bisulfate 75 MG Oral Tab Take 1 tablet (75 mg total) by mouth daily. Disp: 90 tablet Rfl: 3   aspirin 325 MG Oral Tab Take 1 tablet (325 mg total) by mouth daily.  Disp: 90 tablet Rfl: 0     Al

## 2018-04-19 ENCOUNTER — TELEPHONE (OUTPATIENT)
Dept: FAMILY MEDICINE CLINIC | Facility: CLINIC | Age: 73
End: 2018-04-19

## 2018-04-19 DIAGNOSIS — E11.9 CONTROLLED TYPE 2 DIABETES MELLITUS WITHOUT COMPLICATION, WITHOUT LONG-TERM CURRENT USE OF INSULIN (HCC): ICD-10-CM

## 2018-04-19 NOTE — TELEPHONE ENCOUNTER
----- Message from Aydee Malloy MD sent at 4/19/2018 10:24 AM CDT -----  Continue Metformin 500mg PO BID, medications e-prescribed; needs repeat BW (CMP, Hgb A1c) in 3 months prior to next f/u appointment; ok to file.

## 2018-04-20 ENCOUNTER — TELEPHONE (OUTPATIENT)
Dept: SURGERY | Facility: CLINIC | Age: 73
End: 2018-04-20

## 2018-04-20 DIAGNOSIS — C61 PROSTATE CANCER (HCC): Primary | ICD-10-CM

## 2018-04-20 NOTE — TELEPHONE ENCOUNTER
Nurses, please call patient and asked him detailed information as to how I can talk to him over the phone; I will be in surgery this afternoon; I have called him a few times during the last 2 days, and left messages but he does not  on the cell phone that I number that I have. I need to know how he is doing after the prostate biopsy and and I also have the discussed with him the prostate biopsy results.   Many thanks, Dr. Dennis Haddad

## 2018-04-25 NOTE — TELEPHONE ENCOUNTER
CRISTIAN again. If pt calls back he can be offered an appt for this week thurs. , 4/26 in the afternoon, put the call thru to the RN's.

## 2018-05-01 ENCOUNTER — APPOINTMENT (OUTPATIENT)
Dept: RADIATION ONCOLOGY | Facility: HOSPITAL | Age: 73
End: 2018-05-01
Attending: RADIOLOGY
Payer: MEDICARE

## 2018-05-06 ENCOUNTER — TELEPHONE (OUTPATIENT)
Dept: SURGERY | Facility: CLINIC | Age: 73
End: 2018-05-06

## 2018-05-07 NOTE — TELEPHONE ENCOUNTER
I filled out and sent pt a certified letter and placed in the outgoing mail. I verbally informed Dr. Buddy Echeverria and I also faxed letter to Dr. Maria Frenanda Edward pt PCP as Dr. Buddy Echeverria requested.

## 2018-05-07 NOTE — TELEPHONE ENCOUNTER
Staff, I generated the following letter to the patient and please make sure that a copy is sent to him by a regular mail  and make sure that a copy is sent by certified mail--extremely important. Please reconfirm with me that the above is being done.   The be in danger. Please call my office nurses as soon as possible. I am sending a copy of this letter to you by regular mail and a copy by certified mail to make sure that you receive it.     I am also sending a copy to your primary physician Dr. Nunez Doing

## 2018-05-09 PROBLEM — C61 PROSTATIC ADENOCARCINOMA (HCC): Status: ACTIVE | Noted: 2018-04-16

## 2018-05-10 ENCOUNTER — TELEPHONE (OUTPATIENT)
Dept: FAMILY MEDICINE CLINIC | Facility: CLINIC | Age: 73
End: 2018-05-10

## 2018-05-10 NOTE — TELEPHONE ENCOUNTER
Please make sure that faxed letter was sent to     MONY WINTERS MD,   and not to MONY Pop.    Thanks, Dr. Dennis Patino

## 2018-05-10 NOTE — TELEPHONE ENCOUNTER
5/6/2018              [facility]         Dear Tiffany Novak,    I am writing this letter out of great concern, because you have not returned my multiple calls, nor have you return multiple, multiple calls from [phone]        Document electronically generated by:  Susanne Sapp

## 2018-05-10 NOTE — TELEPHONE ENCOUNTER
Per Dr. Bandar Cali, to call patient as well on behalf of Dr. Meli Roth to relay the prostate biopsy results. Patient answered the call and he denied talking to urologist recently, and also denied having received a letter.   Due to this, nurse read to the patient

## 2018-05-11 NOTE — TELEPHONE ENCOUNTER
Dear Dr. Ani Buckley,     I thank your staff for reaching patient; he failed to answer probably at least 20 different calls from myself and my office nurses during the last 3 weeks or so.   My letter to the patient, that you referred to, was sent to sujit

## 2018-05-11 NOTE — TELEPHONE ENCOUNTER
I spoke with pt and determined that he called back to inform that he got a letter from [address] [place] [address] about 3 weeks ago stating that we no longer accept his [address] ins and he wanted to know for sure if this is true.  I told him that I am pretty s

## 2018-05-11 NOTE — TELEPHONE ENCOUNTER
Pt asking to speak with RN Arnel Martinez. Pt has more questions. Tx call nurse unavailable. Please call. Thank you.

## 2018-05-11 NOTE — TELEPHONE ENCOUNTER
I spoke with pt again and told him that I was told that we do accept [facility] and that we currently have pt's that have this same ins.  I told him that it is up to him if he wants to keep checking with his INS but from our part I think he would be good to

## 2018-05-11 NOTE — TELEPHONE ENCOUNTER
Pt called back and I read the letter that [address] dictated below and I apologized for having to give him this news in this manner.  I gave him instructions on getting the CT and Bone scans schd and I gave the central schdg # to call and I asked that he call our

## 2018-05-11 NOTE — TELEPHONE ENCOUNTER
I called pt back but had to Luana again. I stressed the importance of him calling back LANE and I asked MONTRELL's to put the call thru to MELO [phone].

## 2018-05-17 NOTE — TELEPHONE ENCOUNTER
I have asked Serenity Rivera at the  to get patient in to see me somewhere between 5/23/18--5/30/18.   Dr. Eduardo Bush

## 2018-05-20 ENCOUNTER — TELEPHONE (OUTPATIENT)
Dept: SURGERY | Facility: CLINIC | Age: 73
End: 2018-05-20

## 2018-05-20 NOTE — TELEPHONE ENCOUNTER
Nurses, high risk patient; unfortunately, noncompliant patient and we are trying to avoid further delays; please make sure patient's CT scan and bone scan completed before 5/24/18; please let me know when they scheduled for; personally interrupt me;  [facility] a

## 2018-05-21 ENCOUNTER — HOSPITAL ENCOUNTER (OUTPATIENT)
Dept: CT IMAGING | Facility: HOSPITAL | Age: 73
Discharge: HOME | End: 2018-05-21
Attending: UROLOGY
Payer: MEDICARE

## 2018-05-21 ENCOUNTER — HOSPITAL ENCOUNTER (OUTPATIENT)
Dept: NUCLEAR MEDICINE | Facility: HOSPITAL | Age: 73
Discharge: HOME | End: 2018-05-21
Attending: UROLOGY
Payer: MEDICARE

## 2018-05-21 DIAGNOSIS — C61 PROSTATE CANCER (HCC): ICD-10-CM

## 2018-05-21 PROCEDURE — 74177 CT ABD & PELVIS W/CONTRAST: CPT | Performed by: UROLOGY

## 2018-05-21 PROCEDURE — 82565 ASSAY OF CREATININE: CPT

## 2018-05-21 PROCEDURE — 78306 BONE IMAGING WHOLE BODY: CPT | Performed by: UROLOGY

## 2018-05-24 ENCOUNTER — OFFICE VISIT (OUTPATIENT)
Dept: SURGERY | Facility: CLINIC | Age: 73
End: 2018-05-24

## 2018-05-24 VITALS
DIASTOLIC BLOOD PRESSURE: 70 MMHG | HEIGHT: 64 IN | WEIGHT: 146 LBS | SYSTOLIC BLOOD PRESSURE: 142 MMHG | BODY MASS INDEX: 24.92 KG/M2

## 2018-05-24 DIAGNOSIS — C61 PROSTATE CANCER (HCC): Primary | ICD-10-CM

## 2018-05-24 DIAGNOSIS — I69.30 HISTORY OF CVA WITH RESIDUAL DEFICIT: ICD-10-CM

## 2018-05-24 DIAGNOSIS — N52.01 ERECTILE DYSFUNCTION DUE TO ARTERIAL INSUFFICIENCY: ICD-10-CM

## 2018-05-24 DIAGNOSIS — Z87.891 STOPPED SMOKING WITH GREATER THAN 40 PACK YEAR HISTORY: ICD-10-CM

## 2018-05-24 DIAGNOSIS — Z79.82 ASPIRIN LONG-TERM USE: ICD-10-CM

## 2018-05-24 DIAGNOSIS — Z79.01 ON CLOPIDOGREL THERAPY: ICD-10-CM

## 2018-05-24 DIAGNOSIS — R35.0 BENIGN PROSTATIC HYPERPLASIA WITH URINARY FREQUENCY: ICD-10-CM

## 2018-05-24 DIAGNOSIS — N40.1 BENIGN PROSTATIC HYPERPLASIA WITH URINARY FREQUENCY: ICD-10-CM

## 2018-05-24 DIAGNOSIS — R93.2 ABNORMAL CT OF LIVER: ICD-10-CM

## 2018-05-24 DIAGNOSIS — I48.91 ATRIAL FIBRILLATION, UNSPECIFIED TYPE (HCC): ICD-10-CM

## 2018-05-24 DIAGNOSIS — R35.1 NOCTURIA: ICD-10-CM

## 2018-05-24 PROCEDURE — G0463 HOSPITAL OUTPT CLINIC VISIT: HCPCS | Performed by: UROLOGY

## 2018-05-24 PROCEDURE — 99354 PROLONGED SERV,OFFICE,1ST HR: CPT | Performed by: UROLOGY

## 2018-05-24 PROCEDURE — 96402 CHEMO HORMON ANTINEOPL SQ/IM: CPT | Performed by: UROLOGY

## 2018-05-24 PROCEDURE — 99214 OFFICE O/P EST MOD 30 MIN: CPT | Performed by: UROLOGY

## 2018-05-24 RX ORDER — BICALUTAMIDE 50 MG/1
50 TABLET, FILM COATED ORAL NIGHTLY
Qty: 14 TABLET | Refills: 0 | Status: SHIPPED | OUTPATIENT
Start: 2018-05-24 | End: 2019-01-22 | Stop reason: ALTCHOICE

## 2018-05-24 RX ORDER — BICALUTAMIDE 50 MG/1
50 TABLET, FILM COATED ORAL NIGHTLY
Qty: 14 TABLET | Refills: 0 | Status: SHIPPED | OUTPATIENT
Start: 2018-05-24 | End: 2018-05-24 | Stop reason: CLARIF

## 2018-05-24 NOTE — PATIENT INSTRUCTIONS
1.  Eligard--leuprolide 45 mg injection today--to start suppressing your prostate cancer    2.   Bicalutamide 50 mg daily for the first 2 weeks--  To more rapidly suppress testosterone effects before the Eligard–leuprolide injection starts to work effective

## 2018-05-24 NOTE — PROGRESS NOTES
I was asked by KAREN to administer a Eligard 45 mg SQ injection to this pt 1 time today. I obtained the med from the fridge and I signed it out out the inj.  Log book and I went to the exam room and I introduced myself to the pt and I verified his name and DO

## 2018-05-25 ENCOUNTER — TELEPHONE (OUTPATIENT)
Dept: FAMILY MEDICINE CLINIC | Facility: CLINIC | Age: 73
End: 2018-05-25

## 2018-05-25 DIAGNOSIS — C61 PROSTATE CANCER (HCC): Primary | ICD-10-CM

## 2018-05-25 NOTE — TELEPHONE ENCOUNTER
Tyron Galloway from the [facility] called states pt needs a radiation referral, Dx: prostate cancer (C61). If we could please include 6 visits. Once referral is approved she will have access to view authorization.

## 2018-05-27 NOTE — H&P
Urology visit                                     75year-old male comes to the office with his live-in girlfriend Balbir Calvillo  and history provided by both of them. He wants to be evaluated for ---  1. Newly diagnosed prostate cancer  2.   Voiding diffi above.  03/03/2017 Dr. Kathi Anne; Hx of PAF now sinus; in light of CVA recommend Xarelto (now has insurance and can take he says); consider stress test as outpatient; follow up with Dr. Dion Nichols in 4 weeks.  07/19/2017 Dr. Lucy Jovel; PSA checked; came ba 01/17/18  1359 02/17/18  0750   PSA 11.6* 14.4* 17.8* 15.9*       Renal Labs (last three years):  No results for input(s)  in the last 3 years      Recent Labs   03/03/17  1606 03/04/17  0611 03/05/17  0726 03/07/17  0630 07/20/17  0835 10/19/17  1008 01/1 long-term , has the following ----  1.  clinical stage T1c N0  M0, James's 4+5, grade group 5, adenocarcinoma of prostate, moderate   volume as noted above;    2.   PSA 15.9 and 33% rising PSA in 6 months time  3.  prostate biopsy shows 92.29 g prostate; extraprostatic extension; of there being seminal vesicle or lymph node involvement and the clinical significance and treatment implications of that. The patient understood these issues.  I answered patient's questions on treatment, and he understood my ans Dr. Ulysses Gerald, and I make arrangements for my staff to forward appropriate urology records to the above physicians    I spent 55 minutes with patient in the course of evaluating, counseling and treating him today.

## 2018-05-28 ENCOUNTER — TELEPHONE (OUTPATIENT)
Dept: SURGERY | Facility: CLINIC | Age: 73
End: 2018-05-28

## 2018-05-28 NOTE — TELEPHONE ENCOUNTER
Nurses, please call patient and advised him how to set up MRI abdomen with and without IV gadolinium, attention to liver--I entered the orders; I have explained everything to the patient and he is completely aware.   Many thanks, Dr. Alba Segal

## 2018-05-29 NOTE — TELEPHONE ENCOUNTER
I spoke with pt and informed of willisk's instructions below to schd MRI of the abd and I told him that the order is in the system and he will need to call central schdg to get an appt at the facility of his choice and he should call the office a couple days a

## 2018-05-30 NOTE — TELEPHONE ENCOUNTER
05/30/2018 From Pending Review to [address] [place] [address], Mandie SERRATO     Referral authorized

## 2018-06-01 ENCOUNTER — APPOINTMENT (OUTPATIENT)
Dept: RADIATION ONCOLOGY | Facility: HOSPITAL | Age: 73
End: 2018-06-01
Attending: RADIOLOGY
Payer: MEDICARE

## 2018-06-07 ENCOUNTER — TELEPHONE (OUTPATIENT)
Dept: SURGERY | Facility: CLINIC | Age: 73
End: 2018-06-07

## 2018-06-07 NOTE — TELEPHONE ENCOUNTER
Patient requesting to speak with ALEJANDRINA Alexander. Patient has questions on a prescription medication. Would like to know if he should be refilling his prescription. Please call. Thank you.

## 2018-06-12 ENCOUNTER — OFFICE VISIT (OUTPATIENT)
Dept: RADIATION ONCOLOGY | Facility: HOSPITAL | Age: 73
End: 2018-06-12
Attending: RADIOLOGY
Payer: MEDICARE

## 2018-06-12 VITALS
SYSTOLIC BLOOD PRESSURE: 144 MMHG | WEIGHT: 135 LBS | HEIGHT: 64 IN | BODY MASS INDEX: 23.05 KG/M2 | RESPIRATION RATE: 16 BRPM | DIASTOLIC BLOOD PRESSURE: 63 MMHG | TEMPERATURE: 99 F | HEART RATE: 84 BPM

## 2018-06-12 DIAGNOSIS — C61 PROSTATIC ADENOCARCINOMA (HCC): Primary | ICD-10-CM

## 2018-06-12 PROCEDURE — 99212 OFFICE O/P EST SF 10 MIN: CPT

## 2018-06-12 NOTE — PROGRESS NOTES
Primary language:  English  Language line required?  no  Comprehension Ability:  fair  Able to read?  yes  Able to write? yes  Communication tools:  na  Patient's ability to learn:  fair  Readiness to learn:   Motivated  Learning preferences:  Discussion, related to:    Side effects of treatment    Interventions:  Monitor and trend weight  Assess dietary needs  Instruct patient on importance of caloric intake during treatment  Instruct regarding small frequent meals  Avoid spicy foods and hard to chew foods

## 2018-06-12 NOTE — TELEPHONE ENCOUNTER
pt rtn your call. Should he refill Bicalutamide 50mg. Ok to leave message in case he does not answer. Thank you.

## 2018-06-12 NOTE — PROGRESS NOTES
Nursing Consultation Note  Patient: Hanna De La Torre  YOB: 1945  Age: 75year old  Radiation Oncologist: Dr. Debbie Purcell  Referring Physician: Colan Homans  Diagnosis:No diagnosis found.   Consult Date: 6/12/2018      Chemotherapy: Oral Tab 1 tablet twice daily by mouth; start on the day BEFORE the biopsy Disp: 6 tablet Rfl: 0   Sulfamethoxazole-TMP -160 MG Oral Tab per tablet Take 1 tablet twice daily for 3 days; start 1 day BEFORE the biopsy Disp: 6 tablet Rfl: 0   atorvastat Every Day Smoker  1.00 Packs/day  For 56.00 Years     Smokeless tobacco: Never Used    Alcohol use Yes    Comment: rarely    Drug use: No    Sexual activity: Not on file     Other Topics Concern   None on file     Social History Narrative   None on file

## 2018-06-13 ENCOUNTER — TELEPHONE (OUTPATIENT)
Dept: FAMILY MEDICINE CLINIC | Facility: CLINIC | Age: 73
End: 2018-06-13

## 2018-06-13 DIAGNOSIS — I10 ESSENTIAL HYPERTENSION: ICD-10-CM

## 2018-06-13 DIAGNOSIS — I47.1 PSVT (PAROXYSMAL SUPRAVENTRICULAR TACHYCARDIA) (HCC): ICD-10-CM

## 2018-06-13 DIAGNOSIS — I48.0 PAF (PAROXYSMAL ATRIAL FIBRILLATION) (HCC): ICD-10-CM

## 2018-06-13 RX ORDER — DILTIAZEM HYDROCHLORIDE 120 MG/1
120 CAPSULE, COATED, EXTENDED RELEASE ORAL DAILY
Qty: 30 CAPSULE | Refills: 0 | Status: SHIPPED | OUTPATIENT
Start: 2018-06-13 | End: 2018-07-18

## 2018-06-15 ENCOUNTER — HOSPITAL ENCOUNTER (OUTPATIENT)
Dept: MRI IMAGING | Facility: HOSPITAL | Age: 73
Discharge: HOME | End: 2018-06-15
Attending: UROLOGY
Payer: MEDICARE

## 2018-06-15 DIAGNOSIS — R93.2 ABNORMAL CT OF LIVER: ICD-10-CM

## 2018-06-15 PROCEDURE — 74183 MRI ABD W/O CNTR FLWD CNTR: CPT | Performed by: UROLOGY

## 2018-06-15 PROCEDURE — A9576 INJ PROHANCE MULTIPACK: HCPCS | Performed by: UROLOGY

## 2018-06-18 NOTE — CONSULTS
[facility]    PATIENT'S NAME: Roni Pitts   RADIATION ONCOLOGIST: Kaylyn Clemons.  Afsaneh Torrez MD   PATIENT ACCOUNT #: [de-identified] LOCATION: [address] [place] [address] RECORD #: [id number] YOB: 1945   CONSULTATION DATE: 06/12/2018       RA Minal Hammonds discussed the various treatment options with the patient, including external beam radiation therapy and a possibility of a brachytherapy boost.  He also started the patient on Eligard and bicalutamide, and he started his androgen deprivation ther cavity reveals no ulceration or lesions. NECK:  Supple with no lymphadenopathy. LUNGS:  Clear to auscultation bilaterally. HEART:  Regular rate and rhythm with a normal S1, S2, and no audible murmurs.   LYMPHATICS:  There is no supraclavicular, axillary, that this does mean that there will be more side effects from the external beam component as he will be receiving a higher dose.   I would recommend 4500 cGy to the entirety of the pelvis as he does have some significant risk for occult ellie disease follow should change his mind and does wish to proceed with a brachytherapy boost, that is certainly feasible and he does not need to make his final decision today.   I simply need to know prior to the onset of his actual radiation so that I can plan appropriately

## 2018-06-19 ENCOUNTER — TELEPHONE (OUTPATIENT)
Dept: SURGERY | Facility: CLINIC | Age: 73
End: 2018-06-19

## 2018-06-19 NOTE — TELEPHONE ENCOUNTER
----- Message from Shantel Correia MD sent at 6/17/2018  8:20 PM CDT -----  Please call patient.   MRI of the abdomen with attention to liver showed that the liver lesion seen on recent CT scan are actually only cysts of the liver and the cysts appear shlomo

## 2018-06-21 NOTE — TELEPHONE ENCOUNTER
I spoke with pt and gave all of the info and instructions in KAREN's results msg below and he informed that he is waiting to hear back from Dr. Magnolia Hernandez office with an appt and he also took an appt with KAREN for Mon. 11/26 at 9 am for another 6 mo Eligard i

## 2018-07-01 ENCOUNTER — APPOINTMENT (OUTPATIENT)
Dept: RADIATION ONCOLOGY | Facility: HOSPITAL | Age: 73
End: 2018-07-01
Attending: RADIOLOGY
Payer: MEDICARE

## 2018-07-17 ENCOUNTER — LAB ENCOUNTER (OUTPATIENT)
Dept: LAB | Facility: HOSPITAL | Age: 73
End: 2018-07-17
Payer: MEDICARE

## 2018-07-17 DIAGNOSIS — E11.9 CONTROLLED TYPE 2 DIABETES MELLITUS WITHOUT COMPLICATION, WITHOUT LONG-TERM CURRENT USE OF INSULIN (HCC): ICD-10-CM

## 2018-07-17 DIAGNOSIS — E04.2 MULTINODULAR THYROID: ICD-10-CM

## 2018-07-17 LAB
ALBUMIN SERPL BCP-MCNC: 3.5 G/DL (ref 3.5–4.8)
ALBUMIN/GLOB SERPL: 1.4 {RATIO} (ref 1–2)
ALP SERPL-CCNC: 74 U/L (ref 32–100)
ALT SERPL-CCNC: 19 U/L (ref 17–63)
ANION GAP SERPL CALC-SCNC: 9 MMOL/L (ref 0–18)
AST SERPL-CCNC: 16 U/L (ref 15–41)
BILIRUB SERPL-MCNC: 0.3 MG/DL (ref 0.3–1.2)
BUN SERPL-MCNC: 12 MG/DL (ref 8–20)
BUN/CREAT SERPL: 11.3 (ref 10–20)
CALCIUM SERPL-MCNC: 9.4 MG/DL (ref 8.5–10.5)
CHLORIDE SERPL-SCNC: 104 MMOL/L (ref 95–110)
CO2 SERPL-SCNC: 26 MMOL/L (ref 22–32)
CREAT SERPL-MCNC: 1.06 MG/DL (ref 0.5–1.5)
GLOBULIN PLAS-MCNC: 2.5 G/DL (ref 2.5–3.7)
GLUCOSE SERPL-MCNC: 126 MG/DL (ref 70–99)
HBA1C MFR BLD: 6.2 % (ref 4–6)
OSMOLALITY UR CALC.SUM OF ELEC: 289 MOSM/KG (ref 275–295)
PATIENT FASTING: YES
POTASSIUM SERPL-SCNC: 4.2 MMOL/L (ref 3.3–5.1)
PROT SERPL-MCNC: 6 G/DL (ref 5.9–8.4)
SODIUM SERPL-SCNC: 139 MMOL/L (ref 136–144)
T3 SERPL-MCNC: 1.12 NG/ML (ref 0.87–1.78)
T4 FREE SERPL-MCNC: 0.98 NG/DL (ref 0.58–1.64)
TSH SERPL-ACNC: <0.01 UIU/ML (ref 0.45–5.33)

## 2018-07-17 PROCEDURE — 84439 ASSAY OF FREE THYROXINE: CPT

## 2018-07-17 PROCEDURE — 36415 COLL VENOUS BLD VENIPUNCTURE: CPT

## 2018-07-17 PROCEDURE — 83036 HEMOGLOBIN GLYCOSYLATED A1C: CPT

## 2018-07-17 PROCEDURE — 80053 COMPREHEN METABOLIC PANEL: CPT

## 2018-07-17 PROCEDURE — 84480 ASSAY TRIIODOTHYRONINE (T3): CPT

## 2018-07-17 PROCEDURE — 84443 ASSAY THYROID STIM HORMONE: CPT

## 2018-07-18 ENCOUNTER — OFFICE VISIT (OUTPATIENT)
Dept: FAMILY MEDICINE CLINIC | Facility: CLINIC | Age: 73
End: 2018-07-18
Payer: MEDICARE

## 2018-07-18 VITALS
RESPIRATION RATE: 18 BRPM | SYSTOLIC BLOOD PRESSURE: 122 MMHG | WEIGHT: 137 LBS | OXYGEN SATURATION: 96 % | HEART RATE: 67 BPM | BODY MASS INDEX: 23.39 KG/M2 | DIASTOLIC BLOOD PRESSURE: 64 MMHG | HEIGHT: 64 IN

## 2018-07-18 DIAGNOSIS — I47.1 PSVT (PAROXYSMAL SUPRAVENTRICULAR TACHYCARDIA) (HCC): ICD-10-CM

## 2018-07-18 DIAGNOSIS — E78.2 MIXED HYPERLIPIDEMIA: ICD-10-CM

## 2018-07-18 DIAGNOSIS — I10 ESSENTIAL HYPERTENSION: ICD-10-CM

## 2018-07-18 DIAGNOSIS — I48.0 PAF (PAROXYSMAL ATRIAL FIBRILLATION) (HCC): ICD-10-CM

## 2018-07-18 DIAGNOSIS — E04.2 MULTINODULAR THYROID: ICD-10-CM

## 2018-07-18 DIAGNOSIS — E11.9 CONTROLLED TYPE 2 DIABETES MELLITUS WITHOUT COMPLICATION, WITHOUT LONG-TERM CURRENT USE OF INSULIN (HCC): Primary | ICD-10-CM

## 2018-07-18 PROBLEM — Z86.010 HISTORY OF COLONOSCOPY WITH POLYPECTOMY: Status: ACTIVE | Noted: 2017-07-19

## 2018-07-18 PROBLEM — K59.00 CONSTIPATION: Status: RESOLVED | Noted: 2018-04-18 | Resolved: 2018-07-18

## 2018-07-18 PROBLEM — Z00.00 ENCOUNTER FOR MEDICARE ANNUAL WELLNESS EXAM: Status: RESOLVED | Noted: 2017-03-15 | Resolved: 2018-07-18

## 2018-07-18 PROBLEM — Z98.890 HISTORY OF COLONOSCOPY WITH POLYPECTOMY: Status: ACTIVE | Noted: 2017-07-19

## 2018-07-18 PROBLEM — Z12.12 SCREENING FOR COLORECTAL CANCER: Status: RESOLVED | Noted: 2017-03-15 | Resolved: 2018-07-18

## 2018-07-18 PROBLEM — Z12.11 SCREENING FOR COLORECTAL CANCER: Status: RESOLVED | Noted: 2017-03-15 | Resolved: 2018-07-18

## 2018-07-18 PROBLEM — Z13.31 DEPRESSION SCREENING: Status: RESOLVED | Noted: 2017-07-13 | Resolved: 2018-07-18

## 2018-07-18 PROCEDURE — 99214 OFFICE O/P EST MOD 30 MIN: CPT

## 2018-07-18 RX ORDER — TRIAMTERENE AND HYDROCHLOROTHIAZIDE 37.5; 25 MG/1; MG/1
2 CAPSULE ORAL EVERY MORNING
Qty: 180 CAPSULE | Refills: 0 | Status: SHIPPED | OUTPATIENT
Start: 2018-07-18 | End: 2018-10-18

## 2018-07-18 RX ORDER — AMLODIPINE BESYLATE 5 MG/1
5 TABLET ORAL DAILY
Qty: 90 TABLET | Refills: 0 | Status: SHIPPED | OUTPATIENT
Start: 2018-07-18 | End: 2018-10-18

## 2018-07-18 RX ORDER — DILTIAZEM HYDROCHLORIDE 120 MG/1
120 CAPSULE, COATED, EXTENDED RELEASE ORAL DAILY
Qty: 30 CAPSULE | Refills: 0 | Status: SHIPPED | OUTPATIENT
Start: 2018-07-18 | End: 2018-08-17

## 2018-07-18 RX ORDER — ATORVASTATIN CALCIUM 20 MG/1
20 TABLET, FILM COATED ORAL NIGHTLY
Qty: 90 TABLET | Refills: 1 | Status: SHIPPED | OUTPATIENT
Start: 2018-07-18 | End: 2019-01-22

## 2018-07-18 NOTE — PROGRESS NOTES
HPI:    Patient ID: Danish Jain is a 75year old male. Diabetes   He presents for his follow-up diabetic visit. He has type 2 diabetes mellitus. Onset time: >1 year. His disease course has been stable. There are no hypoglycemic associated symptoms. congestion, ear pain, rhinorrhea and sore throat. Eyes: Positive for blurred vision. Negative for photophobia, discharge and visual disturbance. Respiratory: Negative for cough and shortness of breath.     Cardiovascular: Negative for chest pain and pa Rfl: 0   Clopidogrel Bisulfate 75 MG Oral Tab Take 1 tablet (75 mg total) by mouth daily. Disp: 90 tablet Rfl: 3   aspirin 325 MG Oral Tab Take 1 tablet (325 mg total) by mouth daily.  Disp: 90 tablet Rfl: 0     Allergies:  Penicillins                PHYSIC supraventricular tachycardia) ([facility] [address]  Refill for Diltiazem given. 6. Mixed hyperlipidemia  Refill for Atorvastatin given. RTC in 3 months for f/u of DM.         Orders Placed This Encounter      Comp Metabolic Panel (14) [E]      Hemoglobin A1C (Glycoh

## 2018-07-18 NOTE — PATIENT INSTRUCTIONS
Your Diabetes Foot Care Program    Every day you depend on your feet to keep you moving. But when you have diabetes, your feet need special care. Even a small foot problem can become very serious. So don’t take your feet for granted.  By working with your · Other imaging tests, such as an MRI (magnetic resonance imaging), bone scan, and CT (computed tomography) scan. These can help show bone infections. · Other tests, such as vascular tests, which study the blood flow in your feet and legs.  You may also ha The more you know about diabetes and your feet, the easier it will be to prevent problems. Members of your healthcare team can teach you how to inspect your feet and teach you to look for warning signs. They can also give you other foot care tips.  During o

## 2018-07-19 ENCOUNTER — TELEPHONE (OUTPATIENT)
Dept: HEMATOLOGY/ONCOLOGY | Facility: HOSPITAL | Age: 73
End: 2018-07-19

## 2018-07-25 PROCEDURE — 77334 RADIATION TREATMENT AID(S): CPT | Performed by: RADIOLOGY

## 2018-08-01 ENCOUNTER — NURSE ONLY (OUTPATIENT)
Dept: RADIATION ONCOLOGY | Facility: HOSPITAL | Age: 73
End: 2018-08-01
Attending: RADIOLOGY
Payer: MEDICARE

## 2018-08-08 PROCEDURE — 77301 RADIOTHERAPY DOSE PLAN IMRT: CPT | Performed by: RADIOLOGY

## 2018-08-08 PROCEDURE — 77300 RADIATION THERAPY DOSE PLAN: CPT | Performed by: RADIOLOGY

## 2018-08-08 PROCEDURE — 77338 DESIGN MLC DEVICE FOR IMRT: CPT | Performed by: RADIOLOGY

## 2018-08-13 PROCEDURE — 77385 HC IMRT SIMPLE: CPT | Performed by: RADIOLOGY

## 2018-08-14 ENCOUNTER — OFFICE VISIT (OUTPATIENT)
Dept: RADIATION ONCOLOGY | Facility: HOSPITAL | Age: 73
End: 2018-08-14
Attending: RADIOLOGY
Payer: MEDICARE

## 2018-08-14 VITALS — DIASTOLIC BLOOD PRESSURE: 57 MMHG | HEART RATE: 77 BPM | SYSTOLIC BLOOD PRESSURE: 122 MMHG | RESPIRATION RATE: 16 BRPM

## 2018-08-14 DIAGNOSIS — C61 PROSTATIC ADENOCARCINOMA (HCC): Primary | ICD-10-CM

## 2018-08-14 PROCEDURE — 77385 HC IMRT SIMPLE: CPT | Performed by: RADIOLOGY

## 2018-08-14 NOTE — PROGRESS NOTES
[facility] Management Note 1-5    Patient:  Tiffani Lake  Age:  75year old  Visit Diagnosis:    1.  Prostatic adenocarcinoma [facility])      Primary Rad/Onc:  Dr. Shahida Hayes    Site Delivered Dose (Gy) Prescrib

## 2018-08-15 PROCEDURE — 77385 HC IMRT SIMPLE: CPT | Performed by: RADIOLOGY

## 2018-08-16 PROCEDURE — 77385 HC IMRT SIMPLE: CPT | Performed by: RADIOLOGY

## 2018-08-17 ENCOUNTER — DIETICIAN VISIT (OUTPATIENT)
Dept: NUTRITION | Facility: HOSPITAL | Age: 73
End: 2018-08-17

## 2018-08-17 VITALS — BODY MASS INDEX: 24 KG/M2 | WEIGHT: 139.19 LBS

## 2018-08-17 PROCEDURE — 77336 RADIATION PHYSICS CONSULT: CPT | Performed by: RADIOLOGY

## 2018-08-17 PROCEDURE — 77385 HC IMRT SIMPLE: CPT | Performed by: RADIOLOGY

## 2018-08-17 NOTE — PROGRESS NOTES
Oncology Nutrition Assessment    Ht Readings from Last 1 Encounters:  07/18/18 : 162.6 cm (5' 4\")      Wt Readings from Last 1 Encounters:  08/17/18 : 63.1 kg (139 lb 3.2 oz)    BMI Calculated: Body mass index is 23.89 kg/m². Weight History:   Wt Reading

## 2018-08-20 PROCEDURE — 77385 HC IMRT SIMPLE: CPT | Performed by: RADIOLOGY

## 2018-08-21 ENCOUNTER — OFFICE VISIT (OUTPATIENT)
Dept: RADIATION ONCOLOGY | Facility: HOSPITAL | Age: 73
End: 2018-08-21
Attending: RADIOLOGY
Payer: MEDICARE

## 2018-08-21 VITALS
DIASTOLIC BLOOD PRESSURE: 58 MMHG | TEMPERATURE: 98 F | WEIGHT: 140.38 LBS | HEART RATE: 71 BPM | BODY MASS INDEX: 23.97 KG/M2 | SYSTOLIC BLOOD PRESSURE: 141 MMHG | HEIGHT: 64 IN | RESPIRATION RATE: 16 BRPM

## 2018-08-21 DIAGNOSIS — C61 PROSTATIC ADENOCARCINOMA (HCC): Primary | ICD-10-CM

## 2018-08-21 PROCEDURE — 77385 HC IMRT SIMPLE: CPT | Performed by: RADIOLOGY

## 2018-08-21 NOTE — PROGRESS NOTES
[facility] Radiation Treatment Management Note 6-10    Patient:  Mary Morel  Age:  75year old  Visit Diagnosis:    1.  Prostatic adenocarcinoma [facility])      Primary Rad/Onc:  Dr. Hui Dejesus    Site Delivered Dose (Gy) Prescri

## 2018-08-22 PROCEDURE — 77385 HC IMRT SIMPLE: CPT | Performed by: RADIOLOGY

## 2018-08-23 ENCOUNTER — DIETICIAN VISIT (OUTPATIENT)
Dept: NUTRITION | Facility: HOSPITAL | Age: 73
End: 2018-08-23

## 2018-08-23 VITALS — BODY MASS INDEX: 24 KG/M2 | WEIGHT: 141.38 LBS

## 2018-08-23 PROCEDURE — 77385 HC IMRT SIMPLE: CPT | Performed by: RADIOLOGY

## 2018-08-23 NOTE — PROGRESS NOTES
Oncology Nutrition Assessment    Ht Readings from Last 1 Encounters:  08/21/18 : 162.6 cm (5' 4\")      Wt Readings from Last 1 Encounters:  08/23/18 : 64.1 kg (141 lb 6.4 oz)    BMI Calculated: Body mass index is 24.27 kg/m². Weight History:   Wt Reading changes. Reviewed diet if side effects present. Urinary symptoms--discussed trial of decaf coffee vs regular or 50/50--drinks 2 cups in the am. Discussed trial of cranberry juice. Fluid intake is good--advised to keep up with good fluid intake.  Energy le

## 2018-08-24 PROCEDURE — 77385 HC IMRT SIMPLE: CPT | Performed by: RADIOLOGY

## 2018-08-24 PROCEDURE — 77336 RADIATION PHYSICS CONSULT: CPT | Performed by: RADIOLOGY

## 2018-08-27 PROCEDURE — 77385 HC IMRT SIMPLE: CPT | Performed by: RADIOLOGY

## 2018-08-28 ENCOUNTER — OFFICE VISIT (OUTPATIENT)
Dept: RADIATION ONCOLOGY | Facility: HOSPITAL | Age: 73
End: 2018-08-28
Attending: RADIOLOGY
Payer: MEDICARE

## 2018-08-28 VITALS
BODY MASS INDEX: 24.01 KG/M2 | SYSTOLIC BLOOD PRESSURE: 126 MMHG | DIASTOLIC BLOOD PRESSURE: 84 MMHG | WEIGHT: 140.63 LBS | HEART RATE: 76 BPM | HEIGHT: 64 IN | RESPIRATION RATE: 16 BRPM

## 2018-08-28 DIAGNOSIS — C61 PROSTATIC ADENOCARCINOMA (HCC): Primary | ICD-10-CM

## 2018-08-28 PROCEDURE — 77385 HC IMRT SIMPLE: CPT | Performed by: RADIOLOGY

## 2018-08-28 NOTE — PROGRESS NOTES
[facility] Radiation Treatment Management Note 11-15    Patient:  Shyam Campos  Age:  75year old  Visit Diagnosis:    1.  Prostatic adenocarcinoma [facility])      Primary Rad/Onc:  Dr. Whitney Anaya    Site Delivered Dose (Gy) Prescr

## 2018-08-29 PROCEDURE — 77385 HC IMRT SIMPLE: CPT | Performed by: RADIOLOGY

## 2018-08-30 PROCEDURE — 77385 HC IMRT SIMPLE: CPT | Performed by: RADIOLOGY

## 2018-08-31 DIAGNOSIS — I69.398 HOMONYMOUS HEMIANOPSIA FOLLOWING CEREBROVASCULAR ACCIDENT: ICD-10-CM

## 2018-08-31 DIAGNOSIS — I69.30 HISTORY OF STROKE WITH RESIDUAL DEFICIT: ICD-10-CM

## 2018-08-31 DIAGNOSIS — H53.469 HOMONYMOUS HEMIANOPSIA FOLLOWING CEREBROVASCULAR ACCIDENT: ICD-10-CM

## 2018-08-31 PROCEDURE — 77385 HC IMRT SIMPLE: CPT | Performed by: RADIOLOGY

## 2018-08-31 PROCEDURE — 77336 RADIATION PHYSICS CONSULT: CPT | Performed by: RADIOLOGY

## 2018-09-01 ENCOUNTER — APPOINTMENT (OUTPATIENT)
Dept: RADIATION ONCOLOGY | Facility: HOSPITAL | Age: 73
End: 2018-09-01
Attending: RADIOLOGY
Payer: MEDICARE

## 2018-09-04 ENCOUNTER — TELEPHONE (OUTPATIENT)
Dept: FAMILY MEDICINE CLINIC | Facility: CLINIC | Age: 73
End: 2018-09-04

## 2018-09-04 ENCOUNTER — OFFICE VISIT (OUTPATIENT)
Dept: RADIATION ONCOLOGY | Facility: HOSPITAL | Age: 73
End: 2018-09-04
Attending: RADIOLOGY
Payer: MEDICARE

## 2018-09-04 VITALS
TEMPERATURE: 98 F | HEIGHT: 64 IN | WEIGHT: 140.19 LBS | DIASTOLIC BLOOD PRESSURE: 74 MMHG | BODY MASS INDEX: 23.93 KG/M2 | HEART RATE: 83 BPM | SYSTOLIC BLOOD PRESSURE: 134 MMHG | RESPIRATION RATE: 16 BRPM

## 2018-09-04 DIAGNOSIS — C61 PROSTATIC ADENOCARCINOMA (HCC): Primary | ICD-10-CM

## 2018-09-04 PROCEDURE — 77385 HC IMRT SIMPLE: CPT | Performed by: RADIOLOGY

## 2018-09-04 RX ORDER — ASPIRIN 325 MG
TABLET, DELAYED RELEASE (ENTERIC COATED) ORAL
Qty: 90 TABLET | Refills: 0 | Status: SHIPPED | OUTPATIENT
Start: 2018-09-04 | End: 2019-01-22

## 2018-09-04 NOTE — PROGRESS NOTES
[facility] Radiation Treatment Management Note 16-20    Patient:  Kaitlynn Fernández  Age:  75year old  Visit Diagnosis:    1.  Prostatic adenocarcinoma [facility])      Primary Rad/Onc:  Dr. Saintclair Scholz    Site Delivered Dose (Gy) Prescr

## 2018-09-05 PROCEDURE — 77385 HC IMRT SIMPLE: CPT | Performed by: RADIOLOGY

## 2018-09-06 ENCOUNTER — DIETICIAN VISIT (OUTPATIENT)
Dept: NUTRITION | Facility: HOSPITAL | Age: 73
End: 2018-09-06

## 2018-09-06 VITALS — BODY MASS INDEX: 24 KG/M2 | WEIGHT: 140.81 LBS

## 2018-09-06 PROCEDURE — 77385 HC IMRT SIMPLE: CPT | Performed by: RADIOLOGY

## 2018-09-06 NOTE — PROGRESS NOTES
Oncology Nutrition Assessment    Ht Readings from Last 1 Encounters:  09/04/18 : 162.6 cm (5' 4\")      Wt Readings from Last 1 Encounters:  09/06/18 : 63.9 kg (140 lb 12.8 oz)    BMI Calculated: Body mass index is 24.17 kg/m². Weight History:   Wt Readin intake good. No GI changes. Reviewed diet if side effects present. Urinary symptoms--discussed trial of decaf coffee vs regular or 50/50--drinks 2 cups in the am. Discussed trial of cranberry juice.   Fluid intake is good--advised to keep up with good flui

## 2018-09-07 PROCEDURE — 77336 RADIATION PHYSICS CONSULT: CPT | Performed by: RADIOLOGY

## 2018-09-10 PROCEDURE — 77385 HC IMRT SIMPLE: CPT | Performed by: RADIOLOGY

## 2018-09-11 ENCOUNTER — OFFICE VISIT (OUTPATIENT)
Dept: RADIATION ONCOLOGY | Facility: HOSPITAL | Age: 73
End: 2018-09-11
Attending: RADIOLOGY
Payer: MEDICARE

## 2018-09-11 VITALS
TEMPERATURE: 97 F | HEIGHT: 64 IN | SYSTOLIC BLOOD PRESSURE: 132 MMHG | RESPIRATION RATE: 16 BRPM | BODY MASS INDEX: 24.41 KG/M2 | WEIGHT: 143 LBS | HEART RATE: 61 BPM | DIASTOLIC BLOOD PRESSURE: 54 MMHG

## 2018-09-11 DIAGNOSIS — C61 PROSTATIC ADENOCARCINOMA (HCC): Primary | ICD-10-CM

## 2018-09-11 NOTE — PROGRESS NOTES
[facility] Radiation Treatment Management Note 21-25    Patient:  Nils Fonseca  Age:  75year old  Visit Diagnosis:    1.  Prostatic adenocarcinoma [facility])      Primary Rad/Onc:  Dr. Jose Krishnamurthy    Site Delivered Dose (Gy) Prescr

## 2018-09-12 PROCEDURE — 77385 HC IMRT SIMPLE: CPT | Performed by: RADIOLOGY

## 2018-09-13 PROCEDURE — 77300 RADIATION THERAPY DOSE PLAN: CPT | Performed by: RADIOLOGY

## 2018-09-13 PROCEDURE — 77338 DESIGN MLC DEVICE FOR IMRT: CPT | Performed by: RADIOLOGY

## 2018-09-13 PROCEDURE — 77385 HC IMRT SIMPLE: CPT | Performed by: RADIOLOGY

## 2018-09-14 PROCEDURE — 77336 RADIATION PHYSICS CONSULT: CPT | Performed by: RADIOLOGY

## 2018-09-14 PROCEDURE — 77385 HC IMRT SIMPLE: CPT | Performed by: RADIOLOGY

## 2018-09-18 ENCOUNTER — OFFICE VISIT (OUTPATIENT)
Dept: RADIATION ONCOLOGY | Facility: HOSPITAL | Age: 73
End: 2018-09-18
Attending: RADIOLOGY
Payer: MEDICARE

## 2018-09-18 VITALS
HEIGHT: 64 IN | DIASTOLIC BLOOD PRESSURE: 56 MMHG | TEMPERATURE: 98 F | SYSTOLIC BLOOD PRESSURE: 136 MMHG | BODY MASS INDEX: 24.04 KG/M2 | HEART RATE: 61 BPM | WEIGHT: 140.81 LBS

## 2018-09-18 DIAGNOSIS — C61 PROSTATIC ADENOCARCINOMA (HCC): Primary | ICD-10-CM

## 2018-09-18 PROCEDURE — 77385 HC IMRT SIMPLE: CPT | Performed by: RADIOLOGY

## 2018-09-18 NOTE — PROGRESS NOTES
[facility] Radiation Treatment Management Note 21-25    Patient:  Fiona Jefferson  Age:  75year old  Visit Diagnosis:    1.  Prostatic adenocarcinoma [facility])      Primary Rad/Onc:  Dr. Maria Del Carmen Bhandari    Site Delivered Dose (Gy) Prescr

## 2018-09-19 PROCEDURE — 77385 HC IMRT SIMPLE: CPT | Performed by: RADIOLOGY

## 2018-09-20 ENCOUNTER — DIETICIAN VISIT (OUTPATIENT)
Dept: NUTRITION | Facility: HOSPITAL | Age: 73
End: 2018-09-20

## 2018-09-20 VITALS — BODY MASS INDEX: 24 KG/M2 | WEIGHT: 140 LBS

## 2018-09-20 PROCEDURE — 77385 HC IMRT SIMPLE: CPT | Performed by: RADIOLOGY

## 2018-09-20 NOTE — PROGRESS NOTES
Oncology Nutrition Assessment    Ht Readings from Last 1 Encounters:  09/18/18 : 162.6 cm (5' 4\")      Wt Readings from Last 1 Encounters:  09/20/18 : 63.5 kg (140 lb)    BMI Calculated: Body mass index is 24.03 kg/m². Weight History:   Wt Readings from and intake good. No GI changes. Reviewed diet if side effects present. Urinary symptoms--discussed trial of decaf coffee vs regular or 50/50--drinks 2 cups in the am. Discussed trial of cranberry juice.   Fluid intake is good--advised to keep up with good

## 2018-09-21 PROCEDURE — 77385 HC IMRT SIMPLE: CPT | Performed by: RADIOLOGY

## 2018-09-21 PROCEDURE — 77336 RADIATION PHYSICS CONSULT: CPT | Performed by: RADIOLOGY

## 2018-09-24 PROCEDURE — 77385 HC IMRT SIMPLE: CPT | Performed by: RADIOLOGY

## 2018-09-25 ENCOUNTER — OFFICE VISIT (OUTPATIENT)
Dept: RADIATION ONCOLOGY | Facility: HOSPITAL | Age: 73
End: 2018-09-25
Attending: RADIOLOGY
Payer: MEDICARE

## 2018-09-25 VITALS
WEIGHT: 141.19 LBS | RESPIRATION RATE: 16 BRPM | HEART RATE: 82 BPM | BODY MASS INDEX: 24.1 KG/M2 | TEMPERATURE: 98 F | DIASTOLIC BLOOD PRESSURE: 64 MMHG | HEIGHT: 64 IN | SYSTOLIC BLOOD PRESSURE: 136 MMHG

## 2018-09-25 DIAGNOSIS — C61 PROSTATIC ADENOCARCINOMA (HCC): Primary | ICD-10-CM

## 2018-09-25 PROCEDURE — 77385 HC IMRT SIMPLE: CPT | Performed by: RADIOLOGY

## 2018-09-25 NOTE — PROGRESS NOTES
[facility] Radiation Treatment Management Note 26-30    Patient:  Kapil Gill  Age:  75year old  Visit Diagnosis:    1.  Prostatic adenocarcinoma [facility])      Primary Rad/Onc:  Dr. Rosalinda Castillo    Site Delivered Dose (Gy) Prescr

## 2018-09-26 PROCEDURE — 77385 HC IMRT SIMPLE: CPT | Performed by: RADIOLOGY

## 2018-09-27 PROCEDURE — 77385 HC IMRT SIMPLE: CPT | Performed by: RADIOLOGY

## 2018-09-28 PROCEDURE — 77385 HC IMRT SIMPLE: CPT | Performed by: RADIOLOGY

## 2018-09-28 PROCEDURE — 77336 RADIATION PHYSICS CONSULT: CPT | Performed by: RADIOLOGY

## 2018-10-01 ENCOUNTER — APPOINTMENT (OUTPATIENT)
Dept: RADIATION ONCOLOGY | Facility: HOSPITAL | Age: 73
End: 2018-10-01
Attending: RADIOLOGY
Payer: MEDICARE

## 2018-10-01 PROCEDURE — 77385 HC IMRT SIMPLE: CPT | Performed by: RADIOLOGY

## 2018-10-02 ENCOUNTER — OFFICE VISIT (OUTPATIENT)
Dept: RADIATION ONCOLOGY | Facility: HOSPITAL | Age: 73
End: 2018-10-02
Attending: RADIOLOGY
Payer: MEDICARE

## 2018-10-02 VITALS
HEIGHT: 64 IN | SYSTOLIC BLOOD PRESSURE: 142 MMHG | HEART RATE: 67 BPM | RESPIRATION RATE: 16 BRPM | BODY MASS INDEX: 23.87 KG/M2 | WEIGHT: 139.81 LBS | DIASTOLIC BLOOD PRESSURE: 55 MMHG

## 2018-10-02 DIAGNOSIS — C61 PROSTATIC ADENOCARCINOMA (HCC): Primary | ICD-10-CM

## 2018-10-02 PROCEDURE — 77385 HC IMRT SIMPLE: CPT | Performed by: RADIOLOGY

## 2018-10-02 NOTE — PROGRESS NOTES
[facility] Radiation Treatment Management Note 31-35    Patient:  Lake Jacome  Age:  75year old  Visit Diagnosis:    1.  Prostatic adenocarcinoma [facility])      Primary Rad/Onc:  Dr. Elizabeth Aguilar    Site Delivered Dose (Gy) Prescr

## 2018-10-03 PROCEDURE — 77385 HC IMRT SIMPLE: CPT | Performed by: RADIOLOGY

## 2018-10-04 ENCOUNTER — DIETICIAN VISIT (OUTPATIENT)
Dept: NUTRITION | Facility: HOSPITAL | Age: 73
End: 2018-10-04

## 2018-10-04 VITALS — BODY MASS INDEX: 24 KG/M2 | WEIGHT: 141.19 LBS

## 2018-10-04 PROCEDURE — 77385 HC IMRT SIMPLE: CPT | Performed by: RADIOLOGY

## 2018-10-04 NOTE — PROGRESS NOTES
Oncology Nutrition Assessment    Ht Readings from Last 1 Encounters:  10/02/18 : 162.6 cm (5' 4\")      Wt Readings from Last 1 Encounters:  10/04/18 : 64 kg (141 lb 3.2 oz)    BMI Calculated: Body mass index is 24.24 kg/m². Weight History:   Wt Readings and intake good. No GI changes. Reviewed diet if side effects present. Urinary symptoms--discussed trial of decaf coffee vs regular or 50/50--drinks 2 cups in the am. Discussed trial of cranberry juice.   Fluid intake is good--advised to keep up with good

## 2018-10-05 PROCEDURE — 77385 HC IMRT SIMPLE: CPT | Performed by: RADIOLOGY

## 2018-10-05 PROCEDURE — 77336 RADIATION PHYSICS CONSULT: CPT | Performed by: RADIOLOGY

## 2018-10-08 PROCEDURE — 77385 HC IMRT SIMPLE: CPT | Performed by: RADIOLOGY

## 2018-10-09 ENCOUNTER — OFFICE VISIT (OUTPATIENT)
Dept: RADIATION ONCOLOGY | Facility: HOSPITAL | Age: 73
End: 2018-10-09
Attending: RADIOLOGY
Payer: MEDICARE

## 2018-10-09 VITALS
BODY MASS INDEX: 24.24 KG/M2 | HEIGHT: 64 IN | DIASTOLIC BLOOD PRESSURE: 67 MMHG | WEIGHT: 142 LBS | HEART RATE: 77 BPM | RESPIRATION RATE: 16 BRPM | SYSTOLIC BLOOD PRESSURE: 137 MMHG

## 2018-10-09 DIAGNOSIS — C61 PROSTATIC ADENOCARCINOMA (HCC): Primary | ICD-10-CM

## 2018-10-09 PROCEDURE — 77385 HC IMRT SIMPLE: CPT | Performed by: RADIOLOGY

## 2018-10-09 NOTE — PROGRESS NOTES
[facility] Radiation Treatment Management Note 35-40    Patient:  Dutch Armenta  Age:  75year old  Visit Diagnosis:    1.  Prostatic adenocarcinoma [facility])      Primary Rad/Onc:  Dr. Rogene Runner    Site Delivered Dose (Gy) Prescr

## 2018-10-10 PROCEDURE — 77385 HC IMRT SIMPLE: CPT | Performed by: RADIOLOGY

## 2018-10-11 PROCEDURE — 77385 HC IMRT SIMPLE: CPT | Performed by: RADIOLOGY

## 2018-10-12 PROCEDURE — 77385 HC IMRT SIMPLE: CPT | Performed by: RADIOLOGY

## 2018-10-12 PROCEDURE — 77336 RADIATION PHYSICS CONSULT: CPT | Performed by: RADIOLOGY

## 2018-10-15 PROCEDURE — 77385 HC IMRT SIMPLE: CPT | Performed by: RADIOLOGY

## 2018-10-16 ENCOUNTER — OFFICE VISIT (OUTPATIENT)
Dept: RADIATION ONCOLOGY | Facility: HOSPITAL | Age: 73
End: 2018-10-16
Attending: RADIOLOGY
Payer: MEDICARE

## 2018-10-16 VITALS
BODY MASS INDEX: 24.07 KG/M2 | SYSTOLIC BLOOD PRESSURE: 122 MMHG | RESPIRATION RATE: 18 BRPM | HEIGHT: 64 IN | WEIGHT: 141 LBS | DIASTOLIC BLOOD PRESSURE: 41 MMHG | HEART RATE: 65 BPM

## 2018-10-16 DIAGNOSIS — C61 PROSTATIC ADENOCARCINOMA (HCC): Primary | ICD-10-CM

## 2018-10-16 PROCEDURE — 77385 HC IMRT SIMPLE: CPT | Performed by: RADIOLOGY

## 2018-10-16 NOTE — PROGRESS NOTES
[facility] Radiation Treatment Management Note 41-45    Patient:  India Chavez  Age:  75year old  Visit Diagnosis:    1.  Prostatic adenocarcinoma [facility])      Primary Rad/Onc:  Dr. Corby Arizmendi    Site Delivered Dose (Gy) Prescr

## 2018-10-17 PROCEDURE — 77385 HC IMRT SIMPLE: CPT | Performed by: RADIOLOGY

## 2018-10-18 ENCOUNTER — DOCUMENTATION ONLY (OUTPATIENT)
Dept: RADIATION ONCOLOGY | Facility: HOSPITAL | Age: 73
End: 2018-10-18

## 2018-10-18 ENCOUNTER — OFFICE VISIT (OUTPATIENT)
Dept: FAMILY MEDICINE CLINIC | Facility: CLINIC | Age: 73
End: 2018-10-18
Payer: MEDICARE

## 2018-10-18 VITALS
HEART RATE: 80 BPM | OXYGEN SATURATION: 98 % | SYSTOLIC BLOOD PRESSURE: 104 MMHG | RESPIRATION RATE: 18 BRPM | BODY MASS INDEX: 23 KG/M2 | WEIGHT: 136 LBS | DIASTOLIC BLOOD PRESSURE: 60 MMHG

## 2018-10-18 DIAGNOSIS — C61 PROSTATIC ADENOCARCINOMA (HCC): ICD-10-CM

## 2018-10-18 DIAGNOSIS — E11.9 CONTROLLED TYPE 2 DIABETES MELLITUS WITHOUT COMPLICATION, WITHOUT LONG-TERM CURRENT USE OF INSULIN (HCC): Primary | ICD-10-CM

## 2018-10-18 DIAGNOSIS — I10 ESSENTIAL HYPERTENSION: ICD-10-CM

## 2018-10-18 DIAGNOSIS — E04.2 MULTINODULAR THYROID: ICD-10-CM

## 2018-10-18 DIAGNOSIS — E78.2 MIXED HYPERLIPIDEMIA: ICD-10-CM

## 2018-10-18 DIAGNOSIS — I47.1 PSVT (PAROXYSMAL SUPRAVENTRICULAR TACHYCARDIA) (HCC): ICD-10-CM

## 2018-10-18 DIAGNOSIS — Z28.21 PNEUMOCOCCAL VACCINATION DECLINED BY PATIENT: ICD-10-CM

## 2018-10-18 DIAGNOSIS — Z01.89 ENCOUNTER FOR ROUTINE LABORATORY TESTING: ICD-10-CM

## 2018-10-18 DIAGNOSIS — Z28.21 INFLUENZA VACCINATION DECLINED BY PATIENT: ICD-10-CM

## 2018-10-18 DIAGNOSIS — I48.0 PAF (PAROXYSMAL ATRIAL FIBRILLATION) (HCC): ICD-10-CM

## 2018-10-18 PROCEDURE — 80053 COMPREHEN METABOLIC PANEL: CPT

## 2018-10-18 PROCEDURE — 82570 ASSAY OF URINE CREATININE: CPT

## 2018-10-18 PROCEDURE — 36415 COLL VENOUS BLD VENIPUNCTURE: CPT

## 2018-10-18 PROCEDURE — 99214 OFFICE O/P EST MOD 30 MIN: CPT

## 2018-10-18 PROCEDURE — 83036 HEMOGLOBIN GLYCOSYLATED A1C: CPT

## 2018-10-18 PROCEDURE — 77385 HC IMRT SIMPLE: CPT | Performed by: RADIOLOGY

## 2018-10-18 PROCEDURE — 82043 UR ALBUMIN QUANTITATIVE: CPT

## 2018-10-18 RX ORDER — TRIAMTERENE AND HYDROCHLOROTHIAZIDE 37.5; 25 MG/1; MG/1
1 CAPSULE ORAL EVERY MORNING
Qty: 90 CAPSULE | Refills: 0 | Status: SHIPPED | OUTPATIENT
Start: 2018-10-18 | End: 2019-01-22

## 2018-10-18 RX ORDER — DILTIAZEM HYDROCHLORIDE 120 MG/1
120 CAPSULE, COATED, EXTENDED RELEASE ORAL DAILY
Qty: 90 CAPSULE | Refills: 0 | Status: SHIPPED | OUTPATIENT
Start: 2018-10-18 | End: 2019-01-22

## 2018-10-18 RX ORDER — AMLODIPINE BESYLATE 5 MG/1
5 TABLET ORAL DAILY
Qty: 90 TABLET | Refills: 0 | Status: SHIPPED | OUTPATIENT
Start: 2018-10-18 | End: 2019-01-22

## 2018-10-18 NOTE — PATIENT INSTRUCTIONS
Please follow up with Dr Toledo Earing 11/13/18 @ [phone] am. May start to introduce those foods you have been avoiding in 2 weeks. Please call with any questions or concerns or schedule changes to [facility] @ [phone].

## 2018-10-18 NOTE — PROGRESS NOTES
Pt completed radiation today. Feeling well overall, and tolerated well. No c/o diarrhea, urinary frequency/urgency/burning \"mild\" per pt. Taking AZO with some relief. Wt stable. Saw his PCP this morning.  Will see Dr Chris Schneider next month for his next [facility]

## 2018-10-18 NOTE — PROGRESS NOTES
HPI:    Patient ID: Amos Luna is a 75year old male. Diabetes   He presents for his follow-up diabetic visit. He has type 2 diabetes mellitus. Onset time: >1 year. His disease course has been stable. There are no hypoglycemic associated symptoms. Systems   Constitutional: Negative for activity change, appetite change, chills, diaphoresis, fatigue, fever, malaise/fatigue, unexpected weight change and weight loss. HENT: Negative for congestion, ear pain, rhinorrhea and sore throat.     Eyes: Positiv Rfl:    Ciprofloxacin HCl 500 MG Oral Tab 1 tablet twice daily by mouth; start on the day BEFORE the biopsy Disp: 6 tablet Rfl: 0   Clopidogrel Bisulfate 75 MG Oral Tab Take 1 tablet (75 mg total) by mouth daily.  Disp: 90 tablet Rfl: 3     Allergies:  Peni NO DIFFERENTIAL;  Future  - URINALYSIS, ROUTINE; Future    RTC in 1 month for f/u of HTN and in 3 months for f/u of DM and annual physical exam.      Orders Placed This Encounter      Microalb/Creat Ratio, Random Urine [E]      CBC, Platelet, No Differentia

## 2018-10-19 ENCOUNTER — APPOINTMENT (OUTPATIENT)
Dept: LAB | Facility: HOSPITAL | Age: 73
End: 2018-10-19
Payer: MEDICARE

## 2018-10-19 DIAGNOSIS — Z12.12 SCREENING FOR COLORECTAL CANCER: ICD-10-CM

## 2018-10-19 DIAGNOSIS — E11.9 CONTROLLED TYPE 2 DIABETES MELLITUS WITHOUT COMPLICATION, WITHOUT LONG-TERM CURRENT USE OF INSULIN (HCC): ICD-10-CM

## 2018-10-19 DIAGNOSIS — Z12.11 SCREENING FOR COLORECTAL CANCER: ICD-10-CM

## 2018-10-19 PROCEDURE — 82274 ASSAY TEST FOR BLOOD FECAL: CPT

## 2018-10-19 PROCEDURE — 77336 RADIATION PHYSICS CONSULT: CPT | Performed by: RADIOLOGY

## 2018-11-01 ENCOUNTER — APPOINTMENT (OUTPATIENT)
Dept: RADIATION ONCOLOGY | Facility: HOSPITAL | Age: 73
End: 2018-11-01
Attending: RADIOLOGY
Payer: MEDICARE

## 2018-11-12 NOTE — PROGRESS NOTES
[facility]    PATIENT'S NAME: Jameson Camilo   RADIATION ONCOLOGIST: Salma Jimenez.  Jose Rowe MD   PATIENT ACCOUNT #: [de-identified] LOCATION: [address] [place] [address] RECORD #: [id number] YOB: 1945   DATE: 10/18/2018       RADIATION ONCOL and 6 MV photons. Treatments all began on 08/13/2018 and completed on 10/18/2018 for a total of 67 elapsed days in which he received all 45 prescribed radiation treatments.     TREATMENT SUMMARY:  The patient was treated with definitive radiotherapy for hi

## 2018-11-13 ENCOUNTER — OFFICE VISIT (OUTPATIENT)
Dept: RADIATION ONCOLOGY | Facility: HOSPITAL | Age: 73
End: 2018-11-13
Attending: RADIOLOGY
Payer: MEDICARE

## 2018-11-13 VITALS
HEIGHT: 64 IN | WEIGHT: 137.81 LBS | RESPIRATION RATE: 16 BRPM | SYSTOLIC BLOOD PRESSURE: 131 MMHG | BODY MASS INDEX: 23.53 KG/M2 | DIASTOLIC BLOOD PRESSURE: 58 MMHG | TEMPERATURE: 98 F | HEART RATE: 72 BPM

## 2018-11-13 DIAGNOSIS — C61 PROSTATIC ADENOCARCINOMA (HCC): Primary | ICD-10-CM

## 2018-11-13 PROCEDURE — 99211 OFF/OP EST MAY X REQ PHY/QHP: CPT

## 2018-11-13 NOTE — PROGRESS NOTES
Pt seen in 1 month follow up with Dr Aashish Higginbotham, having completed radiation to the prostate 10/18/18. Pt arrived alone. ADT injection scheduled for 11/26. AUA score 20, which places pt in the severe urinary dysfunction category.  Pt states, however, that he

## 2018-11-13 NOTE — PROGRESS NOTES
[facility]    PATIENT'S NAME: Carl Pan   RADIATION ONCOLOGIST: Ursula Aponte.  Klever Palm MD   PATIENT ACCOUNT #: [de-identified] LOCATION: [address] [place] [address] RECORD #: [id number] YOB: 1945   FOLLOW-UP DATE: 11/13/2018       RADIA level remains normal.  He continues on the androgen deprivation therapy under Dr. Leela Green' care and is tolerating this medication well with no particular side effects. He has an AUA score today of 20/35.     He has had no PSA since completing his treatme Emil Greco MD

## 2018-11-13 NOTE — PATIENT INSTRUCTIONS
Follow up with Dr Leland Dudley 1/17/19 @ 1100 am. Please call Emir White with any schedule changes @ [phone].

## 2018-11-20 ENCOUNTER — OFFICE VISIT (OUTPATIENT)
Dept: FAMILY MEDICINE CLINIC | Facility: CLINIC | Age: 73
End: 2018-11-20
Payer: MEDICARE

## 2018-11-20 VITALS
BODY MASS INDEX: 23.22 KG/M2 | WEIGHT: 136 LBS | OXYGEN SATURATION: 99 % | HEIGHT: 64 IN | HEART RATE: 84 BPM | DIASTOLIC BLOOD PRESSURE: 60 MMHG | SYSTOLIC BLOOD PRESSURE: 120 MMHG

## 2018-11-20 DIAGNOSIS — I48.0 PAF (PAROXYSMAL ATRIAL FIBRILLATION) (HCC): ICD-10-CM

## 2018-11-20 DIAGNOSIS — I47.1 PSVT (PAROXYSMAL SUPRAVENTRICULAR TACHYCARDIA) (HCC): ICD-10-CM

## 2018-11-20 DIAGNOSIS — I10 ESSENTIAL HYPERTENSION: Primary | ICD-10-CM

## 2018-11-20 PROCEDURE — 99213 OFFICE O/P EST LOW 20 MIN: CPT

## 2018-11-20 NOTE — PROGRESS NOTES
HPI:    Patient ID: Jonny Bassett is a 75year old male. Hypertension   This is a chronic problem. The current episode started more than 1 year ago. The problem is controlled. Associated symptoms include blurred vision.  Pertinent negatives include no 180 tablet Rfl: 0   Triamterene-HCTZ 37.5-25 MG Oral Cap Take 1 capsule by mouth every morning. Disp: 90 capsule Rfl: 0   AmLODIPine Besylate 5 MG Oral Tab Take 1 tablet (5 mg total) by mouth daily.  Disp: 90 tablet Rfl: 0   DilTIAZem HCl ER Coated Beads (C Triamterene/HCTZ, and Amlodipine as previously instructed. RTC in 2 months for f/u of HTN, DM, and annual physical exam.      No orders of the defined types were placed in this encounter.       Meds This Visit:  Requested Prescriptions      No prescription

## 2018-11-26 ENCOUNTER — OFFICE VISIT (OUTPATIENT)
Dept: SURGERY | Facility: CLINIC | Age: 73
End: 2018-11-26

## 2018-11-26 VITALS
WEIGHT: 130 LBS | HEART RATE: 45 BPM | RESPIRATION RATE: 16 BRPM | TEMPERATURE: 98 F | BODY MASS INDEX: 22.2 KG/M2 | HEIGHT: 64 IN | DIASTOLIC BLOOD PRESSURE: 64 MMHG | SYSTOLIC BLOOD PRESSURE: 102 MMHG

## 2018-11-26 DIAGNOSIS — Z79.02 LONG TERM CURRENT USE OF CLOPIDOGREL: ICD-10-CM

## 2018-11-26 DIAGNOSIS — N52.9 ERECTILE DYSFUNCTION, UNSPECIFIED ERECTILE DYSFUNCTION TYPE: ICD-10-CM

## 2018-11-26 DIAGNOSIS — N40.1 BENIGN PROSTATIC HYPERPLASIA WITH NOCTURIA: ICD-10-CM

## 2018-11-26 DIAGNOSIS — Z79.82 LONG TERM (CURRENT) USE OF ASPIRIN: ICD-10-CM

## 2018-11-26 DIAGNOSIS — C61 PROSTATE CANCER (HCC): Primary | ICD-10-CM

## 2018-11-26 DIAGNOSIS — I48.91 ATRIAL FIBRILLATION, UNSPECIFIED TYPE (HCC): ICD-10-CM

## 2018-11-26 DIAGNOSIS — R35.1 NOCTURIA: ICD-10-CM

## 2018-11-26 DIAGNOSIS — R35.1 BENIGN PROSTATIC HYPERPLASIA WITH NOCTURIA: ICD-10-CM

## 2018-11-26 PROCEDURE — 96402 CHEMO HORMON ANTINEOPL SQ/IM: CPT | Performed by: UROLOGY

## 2018-11-26 PROCEDURE — 99214 OFFICE O/P EST MOD 30 MIN: CPT | Performed by: UROLOGY

## 2018-11-26 PROCEDURE — G0463 HOSPITAL OUTPT CLINIC VISIT: HCPCS | Performed by: UROLOGY

## 2018-11-26 NOTE — PROGRESS NOTES
HPI:    Patient ID: Maurice Patel is a 75year old male. HPI        Prostate Cancer  Newly diagnosed 5/24/18.  Clinical stage T1c N0  M0, James's 4+5, grade group 5, adenocarcinoma of prostate; started neoadjuvant ADT, and then had definitive extern insurance and can take he says); consider stress test as outpatient; follow up with Dr. Avery Adhikari in 4 weeks. 07/19/2017 Dr. Daniele Tavarez; PSA checked; came back elevated; referred to Urology.  10/04/2017 office consult with me; patient denies any Hx of shakira for speech difficulty. Psychiatric/Behavioral: The patient is not nervous/anxious. Current Outpatient Medications:  MetFORMIN HCl 500 MG Oral Tab Take 1 tablet (500 mg total) by mouth 2 (two) times daily with meals.  Disp: 180 tablet Rfl: 0   T Problems Mother       Social History: Social History    Tobacco Use      Smoking status: Current Every Day Smoker        Packs/day: 1.00        Years: 56.00        Pack years: 64      Smokeless tobacco: Never Used    Alcohol use: Yes      Comment: rarely nodule 1.5 cm.   Kidneys unremarkable and bladder--mild circumferential wall thickening; indeterminate hepatic lesions at least some of which demonstrate intralesional enhancement consider follow-up MRI of the abdomen with and without contrast         5/21/ continue to observe.    (I48.91) Atrial fibrillation, unspecified type ([facility] [address]  Plan: Pt has history of Afib and CVA; pt is currently taking long-term aspirin and clopidogrel.   Both medications are a risk factor for any future invasive procedures    (Z79.82)

## 2018-11-26 NOTE — PATIENT INSTRUCTIONS
Mor Baig M.D.            1.  Eligard/leuprolide 45 mg / 6-month subcutaneous injection today    2. Continue tamsulosin 0.4 mg daily    3. Please keep your appointment to see Dr. Nunez Screws February 2019    4.   Appointm

## 2018-11-26 NOTE — PROGRESS NOTES
I was asked by PVK to administer this pt a 6 mo Eligard 45 mg SQ 1 time today.  I obtained the med from the fridge and I signed it out in the injection log book and I went to the exam room and I introduced myself and verified pt's name and  and told him

## 2018-12-20 RX ORDER — TAMSULOSIN HYDROCHLORIDE 0.4 MG/1
CAPSULE ORAL
Qty: 90 CAPSULE | Refills: 3 | Status: SHIPPED | OUTPATIENT
Start: 2018-12-20 | End: 2019-01-22

## 2018-12-20 NOTE — TELEPHONE ENCOUNTER
Pt of Dr. Brandon Flores [address] [place] [address] 11/26/18 asking for refill on tamsulosin if you agree please review and sign med, thank you. I copied and pasted part of Dr. Cristela Martinez last note below. 1.  Eligard/leuprolide 45 mg / 6-month subcutaneous injection today     2.   Continu

## 2019-01-16 ENCOUNTER — APPOINTMENT (OUTPATIENT)
Dept: LAB | Age: 74
End: 2019-01-16
Attending: RADIOLOGY
Payer: MEDICARE

## 2019-01-16 DIAGNOSIS — C61 PROSTATIC ADENOCARCINOMA (HCC): ICD-10-CM

## 2019-01-16 LAB
PSA SERPL-MCNC: 0 NG/ML (ref 0–4)
PSA SERPL-MCNC: 0.01 NG/ML (ref 0.01–4)

## 2019-01-16 PROCEDURE — 84153 ASSAY OF PSA TOTAL: CPT

## 2019-01-16 PROCEDURE — 36415 COLL VENOUS BLD VENIPUNCTURE: CPT

## 2019-01-17 ENCOUNTER — OFFICE VISIT (OUTPATIENT)
Dept: RADIATION ONCOLOGY | Facility: HOSPITAL | Age: 74
End: 2019-01-17
Attending: RADIOLOGY
Payer: MEDICARE

## 2019-01-17 VITALS
SYSTOLIC BLOOD PRESSURE: 129 MMHG | WEIGHT: 145.38 LBS | TEMPERATURE: 98 F | HEIGHT: 64 IN | DIASTOLIC BLOOD PRESSURE: 69 MMHG | RESPIRATION RATE: 16 BRPM | HEART RATE: 58 BPM | BODY MASS INDEX: 24.82 KG/M2

## 2019-01-17 DIAGNOSIS — C61 PROSTATIC ADENOCARCINOMA (HCC): Primary | ICD-10-CM

## 2019-01-17 PROCEDURE — 99211 OFF/OP EST MAY X REQ PHY/QHP: CPT

## 2019-01-17 NOTE — PROGRESS NOTES
Pt seen in follow up with Dr Leland Dudley, having completed radiation to the prostate 10/18/18. AUA score 17 which places pt in the moderate urinary dysfunction category. Pt is mostly satisfied with his score. ED 0. Wt gain noted.  States he is much happier no

## 2019-01-17 NOTE — PATIENT INSTRUCTIONS
Follow up with Dr Rosalba Morelos in July with a PSA prior to visit. Please call Layla Monroe in June to schedule appointment. [phone].

## 2019-01-18 ENCOUNTER — NURSE ONLY (OUTPATIENT)
Dept: FAMILY MEDICINE CLINIC | Facility: CLINIC | Age: 74
End: 2019-01-18
Payer: MEDICARE

## 2019-01-18 DIAGNOSIS — E04.2 MULTINODULAR THYROID: ICD-10-CM

## 2019-01-18 DIAGNOSIS — I69.30 HISTORY OF STROKE WITH RESIDUAL DEFICIT: ICD-10-CM

## 2019-01-18 DIAGNOSIS — E78.2 MIXED HYPERLIPIDEMIA: ICD-10-CM

## 2019-01-18 DIAGNOSIS — C61 PROSTATIC ADENOCARCINOMA (HCC): ICD-10-CM

## 2019-01-18 DIAGNOSIS — I47.1 PSVT (PAROXYSMAL SUPRAVENTRICULAR TACHYCARDIA) (HCC): ICD-10-CM

## 2019-01-18 DIAGNOSIS — R97.20 ELEVATED PSA: ICD-10-CM

## 2019-01-18 DIAGNOSIS — I69.398 HOMONYMOUS HEMIANOPSIA FOLLOWING CEREBROVASCULAR ACCIDENT: ICD-10-CM

## 2019-01-18 DIAGNOSIS — H53.469 HOMONYMOUS HEMIANOPSIA FOLLOWING CEREBROVASCULAR ACCIDENT: ICD-10-CM

## 2019-01-18 DIAGNOSIS — I48.0 PAF (PAROXYSMAL ATRIAL FIBRILLATION) (HCC): ICD-10-CM

## 2019-01-18 DIAGNOSIS — I10 ESSENTIAL HYPERTENSION: ICD-10-CM

## 2019-01-18 DIAGNOSIS — E11.9 CONTROLLED TYPE 2 DIABETES MELLITUS WITHOUT COMPLICATION, WITHOUT LONG-TERM CURRENT USE OF INSULIN (HCC): ICD-10-CM

## 2019-01-18 DIAGNOSIS — Z01.89 ENCOUNTER FOR ROUTINE LABORATORY TESTING: ICD-10-CM

## 2019-01-18 LAB
ALBUMIN SERPL BCP-MCNC: 3.4 G/DL (ref 3.5–4.8)
ALBUMIN/GLOB SERPL: 1.5 {RATIO} (ref 1–2)
ALP SERPL-CCNC: 75 U/L (ref 32–100)
ALT SERPL-CCNC: 19 U/L (ref 17–63)
ANION GAP SERPL CALC-SCNC: 15 MMOL/L (ref 0–18)
AST SERPL-CCNC: 20 U/L (ref 15–41)
BILIRUB SERPL-MCNC: 0.2 MG/DL (ref 0.3–1.2)
BILIRUB UR QL: NEGATIVE
BUN SERPL-MCNC: 16 MG/DL (ref 8–20)
BUN/CREAT SERPL: 18.2 (ref 10–20)
CALCIUM SERPL-MCNC: 9.3 MG/DL (ref 8.5–10.5)
CHLORIDE SERPL-SCNC: 103 MMOL/L (ref 95–110)
CHOLEST SERPL-MCNC: 121 MG/DL (ref 110–200)
CLARITY UR: CLEAR
CO2 SERPL-SCNC: 23 MMOL/L (ref 22–32)
COLOR UR: YELLOW
CREAT SERPL-MCNC: 0.88 MG/DL (ref 0.5–1.5)
CREAT UR-MCNC: 90.3 MG/DL
ERYTHROCYTE [DISTWIDTH] IN BLOOD BY AUTOMATED COUNT: 18.1 % (ref 11–15)
EST. AVERAGE GLUCOSE BLD GHB EST-MCNC: 128 MG/DL (ref 68–126)
GLOBULIN PLAS-MCNC: 2.3 G/DL (ref 2.5–3.7)
GLUCOSE SERPL-MCNC: 105 MG/DL (ref 70–99)
GLUCOSE UR-MCNC: NEGATIVE MG/DL
HBA1C MFR BLD HPLC: 6.1 % (ref ?–5.7)
HCT VFR BLD AUTO: 33.7 % (ref 41–52)
HDLC SERPL-MCNC: 68 MG/DL
HGB BLD-MCNC: 11.1 G/DL (ref 13.5–17.5)
HGB UR QL STRIP.AUTO: NEGATIVE
KETONES UR-MCNC: NEGATIVE MG/DL
LDLC SERPL CALC-MCNC: 41 MG/DL (ref 0–99)
LEUKOCYTE ESTERASE UR QL STRIP.AUTO: NEGATIVE
MCH RBC QN AUTO: 30.5 PG (ref 27–32)
MCHC RBC AUTO-ENTMCNC: 32.8 G/DL (ref 32–37)
MCV RBC AUTO: 93 FL (ref 80–100)
MICROALBUMIN UR-MCNC: 3.4 MG/DL (ref 0–1.8)
MICROALBUMIN/CREAT UR: 37.7 MG/G{CREAT} (ref 0–20)
NITRITE UR QL STRIP.AUTO: NEGATIVE
NONHDLC SERPL-MCNC: 53 MG/DL
OSMOLALITY UR CALC.SUM OF ELEC: 294 MOSM/KG (ref 275–295)
PATIENT FASTING: YES
PH UR: 5 [PH] (ref 5–8)
PLATELET # BLD AUTO: 246 K/UL (ref 140–400)
PMV BLD AUTO: 8.5 FL (ref 7.4–10.3)
POTASSIUM SERPL-SCNC: 4.7 MMOL/L (ref 3.3–5.1)
PROT SERPL-MCNC: 5.7 G/DL (ref 5.9–8.4)
PROT UR-MCNC: NEGATIVE MG/DL
PSA FREE SERPL-MCNC: 0.01 NG/ML
PSA FREE SERPL-MCNC: <0.015 NG/ML
PSA SERPL-MCNC: 0 NG/ML (ref 0–4)
PSA SERPL-MCNC: 0.01 NG/ML (ref 0.01–4)
RBC # BLD AUTO: 3.62 M/UL (ref 4.5–5.9)
SODIUM SERPL-SCNC: 141 MMOL/L (ref 136–144)
SP GR UR STRIP: 1.02 (ref 1–1.03)
T3 SERPL-MCNC: 0.86 NG/ML (ref 0.87–1.78)
T4 FREE SERPL-MCNC: 0.73 NG/DL (ref 0.58–1.64)
TRIGL SERPL-MCNC: 61 MG/DL (ref 1–149)
TSH SERPL-ACNC: 0.29 UIU/ML (ref 0.45–5.33)
UROBILINOGEN UR STRIP-ACNC: <2
VIT C UR-MCNC: NEGATIVE MG/DL
WBC # BLD AUTO: 4.6 K/UL (ref 4–11)

## 2019-01-18 PROCEDURE — 84154 ASSAY OF PSA FREE: CPT

## 2019-01-18 PROCEDURE — 82570 ASSAY OF URINE CREATININE: CPT

## 2019-01-18 PROCEDURE — 84439 ASSAY OF FREE THYROXINE: CPT

## 2019-01-18 PROCEDURE — 82043 UR ALBUMIN QUANTITATIVE: CPT

## 2019-01-18 PROCEDURE — 83036 HEMOGLOBIN GLYCOSYLATED A1C: CPT

## 2019-01-18 PROCEDURE — 85027 COMPLETE CBC AUTOMATED: CPT

## 2019-01-18 PROCEDURE — 80061 LIPID PANEL: CPT

## 2019-01-18 PROCEDURE — 84153 ASSAY OF PSA TOTAL: CPT

## 2019-01-18 PROCEDURE — 36415 COLL VENOUS BLD VENIPUNCTURE: CPT

## 2019-01-18 PROCEDURE — 84480 ASSAY TRIIODOTHYRONINE (T3): CPT

## 2019-01-18 PROCEDURE — 80053 COMPREHEN METABOLIC PANEL: CPT

## 2019-01-18 PROCEDURE — 84443 ASSAY THYROID STIM HORMONE: CPT

## 2019-01-18 PROCEDURE — 81003 URINALYSIS AUTO W/O SCOPE: CPT

## 2019-01-18 RX ORDER — CLOPIDOGREL BISULFATE 75 MG/1
TABLET ORAL
Qty: 90 TABLET | Refills: 0 | Status: SHIPPED | OUTPATIENT
Start: 2019-01-18 | End: 2019-01-22

## 2019-01-18 NOTE — PROGRESS NOTES
Pt presented to clinic today for blood draw. Per physician able to draw orders. Orders  documented within chart. Pt tolerated lab draw well.  verified.   Orders drawn include: psa, tsh, a1c, lipid, cmp, cbc   Site of draw: rt sebastian Gill CMA

## 2019-01-22 ENCOUNTER — OFFICE VISIT (OUTPATIENT)
Dept: FAMILY MEDICINE CLINIC | Facility: CLINIC | Age: 74
End: 2019-01-22
Payer: MEDICARE

## 2019-01-22 VITALS
DIASTOLIC BLOOD PRESSURE: 70 MMHG | HEIGHT: 62 IN | BODY MASS INDEX: 26.13 KG/M2 | WEIGHT: 142 LBS | SYSTOLIC BLOOD PRESSURE: 110 MMHG | HEART RATE: 77 BPM | TEMPERATURE: 98 F | OXYGEN SATURATION: 97 %

## 2019-01-22 DIAGNOSIS — E11.9 CONTROLLED TYPE 2 DIABETES MELLITUS WITHOUT COMPLICATION, WITHOUT LONG-TERM CURRENT USE OF INSULIN (HCC): ICD-10-CM

## 2019-01-22 DIAGNOSIS — Z13.31 DEPRESSION SCREENING: ICD-10-CM

## 2019-01-22 DIAGNOSIS — E04.2 MULTINODULAR THYROID: ICD-10-CM

## 2019-01-22 DIAGNOSIS — I10 ESSENTIAL HYPERTENSION: ICD-10-CM

## 2019-01-22 DIAGNOSIS — Z98.890 HISTORY OF COLONOSCOPY WITH POLYPECTOMY: ICD-10-CM

## 2019-01-22 DIAGNOSIS — H47.20 OPTIC NERVE ATROPHY: ICD-10-CM

## 2019-01-22 DIAGNOSIS — F17.200 TOBACCO USE DISORDER: ICD-10-CM

## 2019-01-22 DIAGNOSIS — I48.0 PAF (PAROXYSMAL ATRIAL FIBRILLATION) (HCC): ICD-10-CM

## 2019-01-22 DIAGNOSIS — C61 PROSTATIC ADENOCARCINOMA (HCC): ICD-10-CM

## 2019-01-22 DIAGNOSIS — Z28.21 INFLUENZA VACCINATION DECLINED BY PATIENT: ICD-10-CM

## 2019-01-22 DIAGNOSIS — E78.2 MIXED HYPERLIPIDEMIA: ICD-10-CM

## 2019-01-22 DIAGNOSIS — I47.1 PSVT (PAROXYSMAL SUPRAVENTRICULAR TACHYCARDIA) (HCC): ICD-10-CM

## 2019-01-22 DIAGNOSIS — I69.398 HOMONYMOUS HEMIANOPSIA FOLLOWING CEREBROVASCULAR ACCIDENT: ICD-10-CM

## 2019-01-22 DIAGNOSIS — Z00.00 ENCOUNTER FOR MEDICARE ANNUAL WELLNESS EXAM: Primary | ICD-10-CM

## 2019-01-22 DIAGNOSIS — D64.9 NORMOCYTIC ANEMIA: ICD-10-CM

## 2019-01-22 DIAGNOSIS — H53.469 HOMONYMOUS HEMIANOPSIA FOLLOWING CEREBROVASCULAR ACCIDENT: ICD-10-CM

## 2019-01-22 DIAGNOSIS — E66.3 OVERWEIGHT (BMI 25.0-29.9): ICD-10-CM

## 2019-01-22 DIAGNOSIS — Z28.21 PNEUMOCOCCAL VACCINATION DECLINED BY PATIENT: ICD-10-CM

## 2019-01-22 DIAGNOSIS — Z86.010 HISTORY OF COLONOSCOPY WITH POLYPECTOMY: ICD-10-CM

## 2019-01-22 DIAGNOSIS — I69.30 HISTORY OF STROKE WITH RESIDUAL DEFICIT: ICD-10-CM

## 2019-01-22 DIAGNOSIS — H26.9 CATARACT OF BOTH EYES, UNSPECIFIED CATARACT TYPE: ICD-10-CM

## 2019-01-22 DIAGNOSIS — R97.20 ELEVATED PSA: ICD-10-CM

## 2019-01-22 PROCEDURE — 96160 PT-FOCUSED HLTH RISK ASSMT: CPT

## 2019-01-22 PROCEDURE — G0439 PPPS, SUBSEQ VISIT: HCPCS

## 2019-01-22 PROCEDURE — 99397 PER PM REEVAL EST PAT 65+ YR: CPT

## 2019-01-22 RX ORDER — ATORVASTATIN CALCIUM 20 MG/1
20 TABLET, FILM COATED ORAL NIGHTLY
Qty: 90 TABLET | Refills: 3 | Status: SHIPPED | OUTPATIENT
Start: 2019-01-22 | End: 2019-05-07

## 2019-01-22 RX ORDER — ASPIRIN 81 MG/1
81 TABLET ORAL DAILY
Qty: 90 TABLET | Refills: 3 | Status: SHIPPED | OUTPATIENT
Start: 2019-01-22 | End: 2020-02-10

## 2019-01-22 RX ORDER — DILTIAZEM HYDROCHLORIDE 120 MG/1
120 CAPSULE, COATED, EXTENDED RELEASE ORAL DAILY
Qty: 90 CAPSULE | Refills: 1 | Status: SHIPPED | OUTPATIENT
Start: 2019-01-22 | End: 2019-09-03

## 2019-01-22 RX ORDER — TRIAMTERENE AND HYDROCHLOROTHIAZIDE 37.5; 25 MG/1; MG/1
1 CAPSULE ORAL EVERY MORNING
Qty: 90 CAPSULE | Refills: 1 | Status: SHIPPED | OUTPATIENT
Start: 2019-01-22 | End: 2019-11-06

## 2019-01-22 RX ORDER — TAMSULOSIN HYDROCHLORIDE 0.4 MG/1
0.4 CAPSULE ORAL DAILY
Qty: 90 CAPSULE | Refills: 3 | Status: SHIPPED | OUTPATIENT
Start: 2019-01-22 | End: 2020-01-23

## 2019-01-22 RX ORDER — CLOPIDOGREL BISULFATE 75 MG/1
75 TABLET ORAL DAILY
Qty: 90 TABLET | Refills: 3 | Status: SHIPPED | OUTPATIENT
Start: 2019-01-22 | End: 2019-05-03

## 2019-01-22 RX ORDER — AMLODIPINE BESYLATE 5 MG/1
5 TABLET ORAL DAILY
Qty: 90 TABLET | Refills: 1 | Status: SHIPPED | OUTPATIENT
Start: 2019-01-22 | End: 2019-11-06

## 2019-01-23 PROBLEM — J41.0 SMOKERS' COUGH (HCC): Status: ACTIVE | Noted: 2019-01-23

## 2019-01-23 PROBLEM — E66.3 OVERWEIGHT (BMI 25.0-29.9): Status: ACTIVE | Noted: 2017-03-09

## 2019-01-23 PROBLEM — J41.0 SMOKERS' COUGH (HCC): Chronic | Status: ACTIVE | Noted: 2019-01-23

## 2019-01-23 PROBLEM — D64.9 NORMOCYTIC ANEMIA: Status: ACTIVE | Noted: 2019-01-18

## 2019-01-24 PROBLEM — I70.0 AORTIC ATHEROSCLEROSIS (HCC): Status: ACTIVE | Noted: 2017-03-03

## 2019-01-24 NOTE — PROGRESS NOTES
HPI:   Silver Clemons is a 75year old male who presents for a MA (Medicare Advantage) [facility] (Once per calendar year). Pt denies any acute complaints at this time.       Fall/Risk Assessment   He has been screened for Falls and is low risk: Fall/Ri at low risk.      Patient Care Team: Patient Care Team:  Jorge Martino MD as PCP - General [facility])  Graeme Elizabeth MD as Consulting Physician (Keiko Noel)  Cullen Shay MD (Radiation Oncology)  Kosta Loyola MD ([facility])  Annabelle Garcia, the 1/22/19 encounter (Office Visit) with Estella Mar MD:  atorvastatin 20 MG Oral Tab Take 1 tablet (20 mg total) by mouth nightly. AmLODIPine Besylate 5 MG Oral Tab Take 1 tablet (5 mg total) by mouth daily.    Triamterene-HCTZ 37.5-25 MG Oral Cap pain, denies heartburn  : 1-2 per night nocturia, no complaint of urinary incontinence  MUSCULOSKELETAL: denies back pain  NEURO: denies headaches  PSYCHE: denies depression or anxiety  HEMATOLOGIC: denies hx of anemia  ENDOCRINE: denies thyroid history non-tender, bowel sounds active all four quadrants,  no masses, no organomegaly   Genitalia: Deferred   Rectal: Deferred   Extremities: Extremities normal, atraumatic, no cyanosis or edema  Bilateral barefoot skin diabetic exam is normal, visualized feet a total) by mouth nightly. Essential hypertension  -     Pt advised to decrease amount of salt intake in his diet. - Will continue BP monitoring on next scheduled appointment. - AmLODIPine Besylate 5 MG Oral Tab;  Take 1 tablet (5 mg total) by mouth sarai 10/19/19. Tobacco use disorder  -     Pt counseled with regards to smoking cessation but states that he is not ready to quit at this time as he feels that he does not smoke in excess.     Pneumococcal vaccination declined by patient    Influenza vaccinat every 10 years No results found for this or any previous visit. No flowsheet data found. Fecal Occult Blood Annually Occult Blood (no units)   Date Value   10/19/2018 Negative    No flowsheet data found.     Glaucoma Screening      Ophthalmology Visit A 0.88    No flowsheet data found. Drug Serum Conc  Annually No results found for: DIGOXIN, DIG, VALP No flowsheet data found.        Diabetes      HgbA1C  Annually HgbA1C (%)   Date Value   01/18/2019 6.1 (H)   10/19/2017 5.9       No flowsheet data found

## 2019-01-24 NOTE — PATIENT INSTRUCTIONS
Jerson Kumari's SCREENING SCHEDULE   Tests on this list are recommended by your physician but may not be covered, or covered at this frequency, by your insurer. Please check with your insurance carrier before scheduling to verify coverage.     Jacky López Screening Covered up to Age 76     Colonoscopy Screen   Covered every 10 years- more often if abnormal Colonoscopy due on 03/26/1945 Update Health Maintenance if applicable    Flex Sigmoidoscopy Screen  Covered every 5 years No results found for this or an B) No orders found for this or any previous visit. This may be covered with your prescription benefits, but Medicare does not cover unless Medically needed    Zoster (Not covered by Medicare Part B) No orders found for this or any previous visit.  This may

## 2019-03-18 NOTE — PHYSICAL THERAPY NOTE
FAX REFERRAL AND PATIENT WILL  TODAY     PHYSICAL THERAPY EVALUATION - INPATIENT     Room Number: 544/544-A  Evaluation Date: 3/4/2017  Type of Evaluation: Initial  Physician Order: PT Eval and Treat    Presenting Problem: pneumonia  Reason for Therapy: Mobility Dysfunction and Discharge Plan on back to sitting on the side of the bed?: None   How much help from another person does the patient currently need. ..   -   Moving to and from a bed to a chair (including a wheelchair)?: None   -   Need to walk in hospital room?: None   -   Climbing 3-5

## 2019-05-01 DIAGNOSIS — E78.2 MIXED HYPERLIPIDEMIA: ICD-10-CM

## 2019-05-01 DIAGNOSIS — H53.469 HOMONYMOUS HEMIANOPSIA FOLLOWING CEREBROVASCULAR ACCIDENT: ICD-10-CM

## 2019-05-01 DIAGNOSIS — I47.10 PSVT (PAROXYSMAL SUPRAVENTRICULAR TACHYCARDIA): ICD-10-CM

## 2019-05-01 DIAGNOSIS — I48.0 PAF (PAROXYSMAL ATRIAL FIBRILLATION) (HCC): ICD-10-CM

## 2019-05-01 DIAGNOSIS — I69.398 HOMONYMOUS HEMIANOPSIA FOLLOWING CEREBROVASCULAR ACCIDENT: ICD-10-CM

## 2019-05-01 DIAGNOSIS — I69.30 HISTORY OF STROKE WITH RESIDUAL DEFICIT: ICD-10-CM

## 2019-05-01 RX ORDER — ATORVASTATIN CALCIUM 20 MG/1
TABLET, FILM COATED ORAL
Qty: 90 TABLET | Refills: 0 | OUTPATIENT
Start: 2019-05-01

## 2019-05-01 RX ORDER — CLOPIDOGREL BISULFATE 75 MG/1
TABLET ORAL
Qty: 90 TABLET | Refills: 0 | OUTPATIENT
Start: 2019-05-01

## 2019-05-03 ENCOUNTER — TELEPHONE (OUTPATIENT)
Dept: FAMILY MEDICINE CLINIC | Facility: CLINIC | Age: 74
End: 2019-05-03

## 2019-05-03 DIAGNOSIS — I69.398 HOMONYMOUS HEMIANOPSIA FOLLOWING CEREBROVASCULAR ACCIDENT: ICD-10-CM

## 2019-05-03 DIAGNOSIS — I69.30 HISTORY OF STROKE WITH RESIDUAL DEFICIT: ICD-10-CM

## 2019-05-03 DIAGNOSIS — I47.1 PSVT (PAROXYSMAL SUPRAVENTRICULAR TACHYCARDIA) (HCC): ICD-10-CM

## 2019-05-03 DIAGNOSIS — H53.469 HOMONYMOUS HEMIANOPSIA FOLLOWING CEREBROVASCULAR ACCIDENT: ICD-10-CM

## 2019-05-03 DIAGNOSIS — I48.0 PAF (PAROXYSMAL ATRIAL FIBRILLATION) (HCC): ICD-10-CM

## 2019-05-03 RX ORDER — CLOPIDOGREL BISULFATE 75 MG/1
75 TABLET ORAL DAILY
Qty: 90 TABLET | Refills: 2 | Status: SHIPPED | OUTPATIENT
Start: 2019-05-03 | End: 2020-02-10

## 2019-05-03 NOTE — TELEPHONE ENCOUNTER
Refill got resent to [facility], Dr Bernie Farah did authorized a one year supply back in January. Message left informing him about the approval and to make sure he keeps his appointment to follow up on his DM.

## 2019-05-03 NOTE — TELEPHONE ENCOUNTER
Clopidogrel Bisulfate 75 MG Oral Tab. Patient next appt May 7, 2019. Patient is aware he needs BW, states he's going downstairs tomorrow.

## 2019-05-04 ENCOUNTER — APPOINTMENT (OUTPATIENT)
Dept: LAB | Facility: HOSPITAL | Age: 74
End: 2019-05-04
Payer: MEDICARE

## 2019-05-04 PROCEDURE — 36415 COLL VENOUS BLD VENIPUNCTURE: CPT

## 2019-05-04 PROCEDURE — 82570 ASSAY OF URINE CREATININE: CPT

## 2019-05-04 PROCEDURE — 83036 HEMOGLOBIN GLYCOSYLATED A1C: CPT

## 2019-05-04 PROCEDURE — 82043 UR ALBUMIN QUANTITATIVE: CPT

## 2019-05-04 PROCEDURE — 85027 COMPLETE CBC AUTOMATED: CPT

## 2019-05-04 PROCEDURE — 80053 COMPREHEN METABOLIC PANEL: CPT

## 2019-05-07 ENCOUNTER — OFFICE VISIT (OUTPATIENT)
Dept: FAMILY MEDICINE CLINIC | Facility: CLINIC | Age: 74
End: 2019-05-07
Payer: MEDICARE

## 2019-05-07 VITALS
SYSTOLIC BLOOD PRESSURE: 112 MMHG | DIASTOLIC BLOOD PRESSURE: 60 MMHG | WEIGHT: 149 LBS | BODY MASS INDEX: 27 KG/M2 | HEART RATE: 71 BPM | OXYGEN SATURATION: 97 %

## 2019-05-07 DIAGNOSIS — E11.29 CONTROLLED TYPE 2 DIABETES MELLITUS WITH MICROALBUMINURIA, WITHOUT LONG-TERM CURRENT USE OF INSULIN (HCC): Primary | ICD-10-CM

## 2019-05-07 DIAGNOSIS — R80.9 CONTROLLED TYPE 2 DIABETES MELLITUS WITH MICROALBUMINURIA, WITHOUT LONG-TERM CURRENT USE OF INSULIN (HCC): Primary | ICD-10-CM

## 2019-05-07 DIAGNOSIS — E78.2 MIXED HYPERLIPIDEMIA: ICD-10-CM

## 2019-05-07 PROBLEM — Z00.00 ENCOUNTER FOR MEDICARE ANNUAL WELLNESS EXAM: Status: RESOLVED | Noted: 2017-03-15 | Resolved: 2019-05-07

## 2019-05-07 PROBLEM — Z13.31 DEPRESSION SCREENING: Status: RESOLVED | Noted: 2017-07-13 | Resolved: 2019-05-07

## 2019-05-07 PROCEDURE — 99213 OFFICE O/P EST LOW 20 MIN: CPT

## 2019-05-07 RX ORDER — ATORVASTATIN CALCIUM 20 MG/1
20 TABLET, FILM COATED ORAL NIGHTLY
Qty: 90 TABLET | Refills: 2 | Status: SHIPPED | OUTPATIENT
Start: 2019-05-07 | End: 2020-02-10

## 2019-05-07 NOTE — PATIENT INSTRUCTIONS
Diabetes: Caring for Your Body    When you have diabetes, your body needs special care. This care helps you stay healthy and prevent complications. Exercise and healthy eating are a part of this.  You can also protect yourself by taking special care of yo Date Last Reviewed: 3/1/2016  © 9553-3743 [employer]. [facility], [address] [place]. All rights reserved. This information is not intended as a substitute for professional medical care.  Always follow your healthcare professional'

## 2019-05-07 NOTE — PROGRESS NOTES
HPI:    Patient ID: Flora Aponte is a 75year old male. Diabetes   He presents for his follow-up diabetic visit. He has type 2 diabetes mellitus. Onset time: >1 year. His disease course has been stable. There are no hypoglycemic associated symptoms. arthralgias, joint swelling, myalgias and neck pain. Skin: Negative for rash. Neurological: Negative for dizziness, syncope, weakness, light-headedness, numbness and headaches. All other systems reviewed and are negative.              Current Outpatie exercise. Continue Metformin as previously instructed and refill given. Will obtain CMP and Hgb A1c prior to next scheduled appointment. Will refer to Dr. Radha Blas (Nephrology) for further evaluation.     2. Mixed hyperlipidemia  Refill for Atorvastatin gi

## 2019-05-22 ENCOUNTER — APPOINTMENT (OUTPATIENT)
Dept: LAB | Facility: HOSPITAL | Age: 74
End: 2019-05-22
Attending: UROLOGY
Payer: MEDICARE

## 2019-05-22 DIAGNOSIS — C61 PROSTATE CANCER (HCC): ICD-10-CM

## 2019-05-22 PROCEDURE — 36415 COLL VENOUS BLD VENIPUNCTURE: CPT

## 2019-05-22 PROCEDURE — 84153 ASSAY OF PSA TOTAL: CPT

## 2019-06-05 ENCOUNTER — OFFICE VISIT (OUTPATIENT)
Dept: SURGERY | Facility: CLINIC | Age: 74
End: 2019-06-05
Payer: MEDICARE

## 2019-06-05 ENCOUNTER — TELEPHONE (OUTPATIENT)
Dept: SURGERY | Facility: CLINIC | Age: 74
End: 2019-06-05

## 2019-06-05 VITALS
SYSTOLIC BLOOD PRESSURE: 125 MMHG | HEIGHT: 64 IN | WEIGHT: 148 LBS | HEART RATE: 69 BPM | DIASTOLIC BLOOD PRESSURE: 55 MMHG | TEMPERATURE: 98 F | BODY MASS INDEX: 25.27 KG/M2

## 2019-06-05 DIAGNOSIS — I48.91 ATRIAL FIBRILLATION, UNSPECIFIED TYPE (HCC): ICD-10-CM

## 2019-06-05 DIAGNOSIS — C61 PROSTATE CANCER (HCC): Primary | ICD-10-CM

## 2019-06-05 DIAGNOSIS — R35.1 NOCTURIA: ICD-10-CM

## 2019-06-05 DIAGNOSIS — R35.0 BENIGN PROSTATIC HYPERPLASIA WITH URINARY FREQUENCY: ICD-10-CM

## 2019-06-05 DIAGNOSIS — N52.9 ERECTILE DYSFUNCTION, UNSPECIFIED ERECTILE DYSFUNCTION TYPE: ICD-10-CM

## 2019-06-05 DIAGNOSIS — Z79.82 LONG TERM (CURRENT) USE OF ASPIRIN: ICD-10-CM

## 2019-06-05 DIAGNOSIS — Z79.02 LONG TERM CURRENT USE OF CLOPIDOGREL: ICD-10-CM

## 2019-06-05 DIAGNOSIS — N40.1 BENIGN PROSTATIC HYPERPLASIA WITH URINARY FREQUENCY: ICD-10-CM

## 2019-06-05 PROCEDURE — 99214 OFFICE O/P EST MOD 30 MIN: CPT | Performed by: UROLOGY

## 2019-06-05 PROCEDURE — G0463 HOSPITAL OUTPT CLINIC VISIT: HCPCS | Performed by: UROLOGY

## 2019-06-05 NOTE — PATIENT INSTRUCTIONS
Melba Mirza M.D.      1.  I recommend Eligard/leuprolide 45 mg / 6-month subcutaneous injection today and you agreed to that    2. Continue tamsulosin 0.4 mg daily    3.   Please keep your appointment to see Dr. Rigo Malin

## 2019-06-05 NOTE — PROGRESS NOTES
HPI:    Patient ID: India Chavez is a 75year old male. HPI     Prostate Cancer  Diagnosed 5/24/18.  Clinical stage T1c N0  M0, James's 4+5, grade group 5, adenocarcinoma of prostate; started neoadjuvant ADT, and then had definitive external beam, with residual deficit; 40-pack-year history of smoking; on clopidogrel long-term and on aspirin long-term         Chart review---     03/03/2017 Dr. Mikki Rivera;  Hx of PAF now sinus; in light of CVA recommend Xarelto (now has insurance and can take he says) visit with Dr. Khai Mercado: 6 onth follow up      Review of Systems   Constitutional: Negative for fever. HENT: Negative for voice change. Respiratory: Negative for chest tightness and shortness of breath. Cardiovascular: Negative for chest pain.    G (paroxysmal supraventricular tachycardia) ([facility] [address] 3/9/2017    S/p SVT ablation 10-31-17    • Tobacco use disorder 3/9/2017    55+ pack year history       Past Surgical History:   Procedure Laterality Date   • COLON SURGERY  1997    repair of perforation foll these are most compatible with cysts    4/16/18 prostate ultrasound during biopsy showed prostate 92.29 g     5/21/18 CT of abdomen and pelvis with IV contrast = no lymphadenopathy in the retroperitoneum or pelvis; prostate markedly enlarged; prominent med perform PSA before the next visit.     (N40.1,  R35.1) Benign prostatic hyperplasia with nocturia  On DAYANARA, prostate flat, 20g, soft, no palpable nodules or indurations.    (R35.1) Nocturia  Patient has current AUA score of 19, moderate voiding dysfunction you wake up at night to urinate    5. Visit with me in 6 months. Blood draw for PSA 1--10 days before visit with me.           Orders Placed This Encounter      PSA - DIAGNOSTIC      Meds This Visit:  Requested Prescriptions      No prescriptions requeste

## 2019-06-05 NOTE — TELEPHONE ENCOUNTER
Medication PA Requested: leuprolide (LUPRON) depot injection 45 mg                                                    Pt insurance/[place] to contact: [place] [address] (O)  [phone] transferred by Giuliana to Medication Management [address] [facility]

## 2019-06-07 ENCOUNTER — APPOINTMENT (OUTPATIENT)
Dept: LAB | Facility: HOSPITAL | Age: 74
End: 2019-06-07
Attending: INTERNAL MEDICINE
Payer: MEDICARE

## 2019-06-07 DIAGNOSIS — R80.9 PROTEINURIA: Primary | ICD-10-CM

## 2019-06-07 DIAGNOSIS — R80.9 PROTEINURIA, UNSPECIFIED TYPE: ICD-10-CM

## 2019-06-07 DIAGNOSIS — E11.9 DIABETES MELLITUS TYPE II, CONTROLLED, WITH NO COMPLICATIONS (HCC): ICD-10-CM

## 2019-06-07 DIAGNOSIS — E78.5 HYPERLIPIDEMIA: ICD-10-CM

## 2019-06-07 DIAGNOSIS — I10 ESSENTIAL HYPERTENSION: ICD-10-CM

## 2019-06-07 DIAGNOSIS — E11.29 CONTROLLED TYPE 2 DIABETES MELLITUS WITH MICROALBUMINURIA, WITHOUT LONG-TERM CURRENT USE OF INSULIN (HCC): ICD-10-CM

## 2019-06-07 DIAGNOSIS — E11.9 DM TYPE 2 (DIABETES MELLITUS, TYPE 2) (HCC): ICD-10-CM

## 2019-06-07 DIAGNOSIS — R80.9 CONTROLLED TYPE 2 DIABETES MELLITUS WITH MICROALBUMINURIA, WITHOUT LONG-TERM CURRENT USE OF INSULIN (HCC): ICD-10-CM

## 2019-06-07 DIAGNOSIS — I10 HTN (HYPERTENSION): ICD-10-CM

## 2019-06-07 DIAGNOSIS — E78.5 HYPERLIPIDEMIA, UNSPECIFIED HYPERLIPIDEMIA TYPE: ICD-10-CM

## 2019-06-07 PROCEDURE — 80048 BASIC METABOLIC PNL TOTAL CA: CPT

## 2019-06-07 PROCEDURE — 81003 URINALYSIS AUTO W/O SCOPE: CPT

## 2019-06-07 PROCEDURE — 36415 COLL VENOUS BLD VENIPUNCTURE: CPT

## 2019-06-07 PROCEDURE — 84156 ASSAY OF PROTEIN URINE: CPT

## 2019-06-07 PROCEDURE — 82570 ASSAY OF URINE CREATININE: CPT

## 2019-06-10 ENCOUNTER — NURSE ONLY (OUTPATIENT)
Dept: SURGERY | Facility: CLINIC | Age: 74
End: 2019-06-10
Payer: MEDICARE

## 2019-06-10 DIAGNOSIS — C61 PROSTATE CANCER (HCC): Primary | ICD-10-CM

## 2019-06-10 PROCEDURE — 96402 CHEMO HORMON ANTINEOPL SQ/IM: CPT | Performed by: UROLOGY

## 2019-06-10 NOTE — PROGRESS NOTES
Pt had an o/v on 6/5 and we could not provide him his 6 month Eligard 45 mg injection at the time of the visit because we had to obtain PA and we could not get the auth that day so we arranged an appt for pt to come in today for his injection.  We obtained

## 2019-06-17 ENCOUNTER — APPOINTMENT (OUTPATIENT)
Dept: LAB | Facility: HOSPITAL | Age: 74
End: 2019-06-17
Attending: RADIOLOGY
Payer: MEDICARE

## 2019-06-17 DIAGNOSIS — C61 PROSTATIC ADENOCARCINOMA (HCC): ICD-10-CM

## 2019-06-17 PROCEDURE — 84153 ASSAY OF PSA TOTAL: CPT

## 2019-06-17 PROCEDURE — 36415 COLL VENOUS BLD VENIPUNCTURE: CPT

## 2019-06-20 ENCOUNTER — OFFICE VISIT (OUTPATIENT)
Dept: RADIATION ONCOLOGY | Facility: HOSPITAL | Age: 74
End: 2019-06-20
Attending: RADIOLOGY
Payer: MEDICARE

## 2019-06-20 VITALS
TEMPERATURE: 98 F | SYSTOLIC BLOOD PRESSURE: 132 MMHG | BODY MASS INDEX: 25.13 KG/M2 | DIASTOLIC BLOOD PRESSURE: 72 MMHG | HEART RATE: 70 BPM | HEIGHT: 64 IN | WEIGHT: 147.19 LBS

## 2019-06-20 DIAGNOSIS — C61 PROSTATIC ADENOCARCINOMA (HCC): Primary | ICD-10-CM

## 2019-06-20 PROCEDURE — 99211 OFF/OP EST MAY X REQ PHY/QHP: CPT

## 2019-06-20 NOTE — PROGRESS NOTES
Pt seen in 6 month follow up with Dr Matthew Abreu, having completed radiation to the prostate 10/18/18. Recent visit with Dr Candace Bridges, received his 6 month injection at that time. AUA score 18 which places pt in the moderate urinary dysfunction category.  PS

## 2019-06-20 NOTE — PROGRESS NOTES
[facility]    PATIENT'S NAME: Scot Gil   RADIATION ONCOLOGIST: Sue Robertson.  Bright Robles MD   PATIENT ACCOUNT #: [de-identified] LOCATION: [address] [place] [address] RECORD #: [id number] YOB: 1945   FOLLOW-UP DATE: 06/20/2019       RADIA energy level are good, and he denies other issues or complaints. The remainder of his medical health is stable. His most recent PSA was on 06/17/2019 and was undetectable.      PHYSICAL EXAMINATION:    VITAL SIGNS:  On exam today, the patient is noted t 10:24:14  [facility] [id number]  NAD/    cc: MD Cherie Cordero MD

## 2019-06-20 NOTE — PATIENT INSTRUCTIONS
Call [facility] in February or early March for follow up appointment with Dr Adonay Manzo. [phone]. PSA for December before you see Dr Kannan Ferrell.

## 2019-08-07 ENCOUNTER — OFFICE VISIT (OUTPATIENT)
Dept: FAMILY MEDICINE CLINIC | Facility: CLINIC | Age: 74
End: 2019-08-07
Payer: MEDICARE

## 2019-08-07 VITALS
HEART RATE: 87 BPM | OXYGEN SATURATION: 98 % | DIASTOLIC BLOOD PRESSURE: 70 MMHG | WEIGHT: 153 LBS | SYSTOLIC BLOOD PRESSURE: 102 MMHG | BODY MASS INDEX: 26 KG/M2

## 2019-08-07 DIAGNOSIS — R80.9 CONTROLLED TYPE 2 DIABETES MELLITUS WITH MICROALBUMINURIA, WITHOUT LONG-TERM CURRENT USE OF INSULIN (HCC): Primary | ICD-10-CM

## 2019-08-07 DIAGNOSIS — E11.29 CONTROLLED TYPE 2 DIABETES MELLITUS WITH MICROALBUMINURIA, WITHOUT LONG-TERM CURRENT USE OF INSULIN (HCC): Primary | ICD-10-CM

## 2019-08-07 DIAGNOSIS — I69.30 HISTORY OF STROKE WITH RESIDUAL DEFICIT: ICD-10-CM

## 2019-08-07 DIAGNOSIS — G44.52 NPDH (NEW PERSISTENT DAILY HEADACHE): ICD-10-CM

## 2019-08-07 PROCEDURE — 99214 OFFICE O/P EST MOD 30 MIN: CPT

## 2019-08-07 PROCEDURE — 36415 COLL VENOUS BLD VENIPUNCTURE: CPT

## 2019-08-07 RX ORDER — BUTALBITAL, ACETAMINOPHEN AND CAFFEINE 300; 40; 50 MG/1; MG/1; MG/1
1 CAPSULE ORAL EVERY 8 HOURS PRN
Qty: 60 CAPSULE | Refills: 0 | Status: SHIPPED | OUTPATIENT
Start: 2019-08-07 | End: 2019-11-06

## 2019-08-07 NOTE — PATIENT INSTRUCTIONS
Computed Tomography (CT)     During the test, relax and remain as still as you can. Computed tomography (CT) is a test that combines X-rays and computer scans.  The result is a detailed picture that can show problems with soft tissues, such as the orquidea · You can go back to your normal diet and activities right away. Any contrast will pass naturally through your body within a day. · Before leaving, you may need to wait briefly while your images are being reviewed.  Your healthcare provider will discuss th

## 2019-08-07 NOTE — PROGRESS NOTES
HPI:    Patient ID: Marciano Hobson is a 75year old male. Diabetes   He presents for his follow-up diabetic visit. He has type 2 diabetes mellitus. Onset time: >2 years. His disease course has been stable. Hypoglycemia symptoms include headaches.  Pert sore throat, swollen glands, tingling, tinnitus, vomiting, weakness or weight loss. The symptoms are aggravated by unknown. He has tried Excedrin and acetaminophen for the symptoms. His past medical history is significant for cancer and hypertension.  There Tab Take 1 tablet (5 mg total) by mouth daily. Disp: 90 tablet Rfl: 1   Triamterene-HCTZ 37.5-25 MG Oral Cap Take 1 capsule by mouth every morning.  Disp: 90 capsule Rfl: 1   DilTIAZem HCl ER Coated Beads (CARTIA XT) 120 MG Oral Capsule SR 24 Hr Take 1 caps Butalbital-APAP-Caffeine -40 MG Oral Cap 60 capsule 0     Sig: Take 1 capsule by mouth every 8 (eight) hours as needed for Headaches.        Imaging & Referrals:  CT BRAIN OR HEAD ([id number])       RV#[id number]

## 2019-08-08 ENCOUNTER — TELEPHONE (OUTPATIENT)
Dept: FAMILY MEDICINE CLINIC | Facility: CLINIC | Age: 74
End: 2019-08-08

## 2019-08-08 DIAGNOSIS — R80.9 CONTROLLED TYPE 2 DIABETES MELLITUS WITH MICROALBUMINURIA, WITHOUT LONG-TERM CURRENT USE OF INSULIN (HCC): ICD-10-CM

## 2019-08-08 DIAGNOSIS — E11.29 CONTROLLED TYPE 2 DIABETES MELLITUS WITH MICROALBUMINURIA, WITHOUT LONG-TERM CURRENT USE OF INSULIN (HCC): ICD-10-CM

## 2019-08-08 LAB
ALBUMIN/GLOBULIN RATIO: 1.6 (CALC) (ref 0.9–2.3)
ALBUMIN: 3.8 G/DL (ref 3.6–5.1)
ALKALINE PHOSPHATASE: 78 U/L (ref 40–115)
ALT: 17 U/L (ref 9–46)
AST: 16 U/L (ref 10–35)
BILIRUBIN, TOTAL: 0.2 MG/DL (ref 0.2–1.2)
BUN: 21 MG/DL (ref 7–25)
CALCIUM: 9.5 MG/DL (ref 8.6–10.3)
CARBON DIOXIDE: 23 MMOL/L (ref 20–32)
CHLORIDE: 106 MMOL/L (ref 98–110)
CREATININE: 0.98 MG/DL (ref 0.7–1.18)
EGFR IF AFRICN AM: 87 ML/MIN/1.73M2
EGFR IF NONAFRICN AM: 75 ML/MIN/1.73M2
GLOBULIN, TOTAL: 2.4 G/DL (CALC) (ref 2.2–4)
GLUCOSE: 90 MG/DL (ref 65–99)
HEMOGLOBIN A1C: 5.8 % OF TOTAL HGB
POTASSIUM: 4.7 MMOL/L (ref 3.4–4.8)
PROTEIN: 6.2 G/DL (ref 6.4–8.4)
SODIUM: 140 MMOL/L (ref 135–146)

## 2019-08-08 NOTE — TELEPHONE ENCOUNTER
Reggie is calling stating patient has an appointment scheduled for ct of brain on Monday and need  An authorization from insurance.  [place] code [place] 64715

## 2019-08-09 NOTE — TELEPHONE ENCOUNTER
----- Message from Jack Talbot MD sent at 8/8/2019  6:27 PM CDT -----  Continue Metformin 500mg PO BID, medications e-prescribed; needs repeat BW (CMP, Hgb A1c) in 3 months prior to next f/u appointment; ok to file.

## 2019-08-09 NOTE — TELEPHONE ENCOUNTER
Type Date User Summary Attachment    08/09/2019  8:42 AM Enmanuel Quintana - -   Note    Per [employer] online CPT code 99436 is approved, Auth #: [id number] valid from 8/9/19 to 9/8/19. CT authorized by insurance; as seen in [facility] [address]

## 2019-08-12 ENCOUNTER — HOSPITAL ENCOUNTER (OUTPATIENT)
Dept: CT IMAGING | Facility: HOSPITAL | Age: 74
Discharge: HOME | End: 2019-08-12
Payer: MEDICARE

## 2019-08-12 DIAGNOSIS — G44.52 NPDH (NEW PERSISTENT DAILY HEADACHE): ICD-10-CM

## 2019-08-12 DIAGNOSIS — I69.30 HISTORY OF STROKE WITH RESIDUAL DEFICIT: ICD-10-CM

## 2019-08-12 PROCEDURE — 70450 CT HEAD/BRAIN W/O DYE: CPT

## 2019-08-14 ENCOUNTER — TELEPHONE (OUTPATIENT)
Dept: FAMILY MEDICINE CLINIC | Facility: CLINIC | Age: 74
End: 2019-08-14

## 2019-08-14 RX ORDER — DIPHENHYDRAMINE HYDROCHLORIDE AND ZINC ACETATE 20; 1 MG/ML; MG/ML
SPRAY TOPICAL
Qty: 100 STRIP | Refills: 2 | Status: SHIPPED | OUTPATIENT
Start: 2019-08-14 | End: 2020-01-01

## 2019-08-14 RX ORDER — BLOOD-GLUCOSE METER
1 KIT MISCELLANEOUS 2 TIMES DAILY
Qty: 1 KIT | Refills: 0 | Status: SHIPPED | OUTPATIENT
Start: 2019-08-14 | End: 2020-01-01

## 2019-08-14 RX ORDER — GLUCOSAM/CHON-MSM1/C/MANG/BOSW 500-416.6
1 TABLET ORAL DAILY
Qty: 100 EACH | Refills: 2 | Status: SHIPPED | OUTPATIENT
Start: 2019-08-14 | End: 2020-01-01

## 2019-08-14 NOTE — TELEPHONE ENCOUNTER
Per Dr Meagan Read nothing chronic was found on his recent Ct Scan, he also wants to know if patient can check his glucose levels once daily for at least 2 weeks, patient was informed and agrees with the plan and will call us with the readings to see what is the

## 2019-09-03 ENCOUNTER — TELEPHONE (OUTPATIENT)
Dept: FAMILY MEDICINE CLINIC | Facility: CLINIC | Age: 74
End: 2019-09-03

## 2019-09-03 DIAGNOSIS — I48.0 PAF (PAROXYSMAL ATRIAL FIBRILLATION) (HCC): ICD-10-CM

## 2019-09-03 DIAGNOSIS — I47.1 PSVT (PAROXYSMAL SUPRAVENTRICULAR TACHYCARDIA) (HCC): ICD-10-CM

## 2019-09-03 DIAGNOSIS — I10 ESSENTIAL HYPERTENSION: ICD-10-CM

## 2019-09-03 RX ORDER — DILTIAZEM HYDROCHLORIDE 120 MG/1
120 CAPSULE, COATED, EXTENDED RELEASE ORAL DAILY
Qty: 90 CAPSULE | Refills: 0 | Status: SHIPPED | OUTPATIENT
Start: 2019-09-03 | End: 2019-11-06

## 2019-09-03 NOTE — TELEPHONE ENCOUNTER
Pharmacy is calling for a refill on DilTIAZem HCl ER Coated Beads (CARTIA XT) 120 MG Oral Capsule SR 24 Hr

## 2019-11-06 ENCOUNTER — OFFICE VISIT (OUTPATIENT)
Dept: FAMILY MEDICINE CLINIC | Facility: CLINIC | Age: 74
End: 2019-11-06
Payer: MEDICARE

## 2019-11-06 VITALS
WEIGHT: 150 LBS | BODY MASS INDEX: 26 KG/M2 | HEART RATE: 67 BPM | SYSTOLIC BLOOD PRESSURE: 112 MMHG | DIASTOLIC BLOOD PRESSURE: 64 MMHG | OXYGEN SATURATION: 97 %

## 2019-11-06 DIAGNOSIS — E78.2 MIXED HYPERLIPIDEMIA: ICD-10-CM

## 2019-11-06 DIAGNOSIS — Z12.12 ENCOUNTER FOR COLORECTAL CANCER SCREENING: ICD-10-CM

## 2019-11-06 DIAGNOSIS — Z12.11 ENCOUNTER FOR COLORECTAL CANCER SCREENING: ICD-10-CM

## 2019-11-06 DIAGNOSIS — E11.29 CONTROLLED TYPE 2 DIABETES MELLITUS WITH MICROALBUMINURIA, WITHOUT LONG-TERM CURRENT USE OF INSULIN (HCC): Primary | ICD-10-CM

## 2019-11-06 DIAGNOSIS — D64.9 NORMOCYTIC ANEMIA: ICD-10-CM

## 2019-11-06 DIAGNOSIS — I47.1 PSVT (PAROXYSMAL SUPRAVENTRICULAR TACHYCARDIA) (HCC): ICD-10-CM

## 2019-11-06 DIAGNOSIS — R80.9 CONTROLLED TYPE 2 DIABETES MELLITUS WITH MICROALBUMINURIA, WITHOUT LONG-TERM CURRENT USE OF INSULIN (HCC): Primary | ICD-10-CM

## 2019-11-06 DIAGNOSIS — I48.0 PAF (PAROXYSMAL ATRIAL FIBRILLATION) (HCC): ICD-10-CM

## 2019-11-06 DIAGNOSIS — E04.2 MULTINODULAR THYROID: ICD-10-CM

## 2019-11-06 DIAGNOSIS — I10 ESSENTIAL HYPERTENSION: ICD-10-CM

## 2019-11-06 DIAGNOSIS — Z28.21 INFLUENZA VACCINATION DECLINED BY PATIENT: ICD-10-CM

## 2019-11-06 PROBLEM — Z23 NEED FOR 23-POLYVALENT PNEUMOCOCCAL POLYSACCHARIDE VACCINE: Status: ACTIVE | Noted: 2019-11-06

## 2019-11-06 PROCEDURE — 36415 COLL VENOUS BLD VENIPUNCTURE: CPT

## 2019-11-06 PROCEDURE — 99214 OFFICE O/P EST MOD 30 MIN: CPT

## 2019-11-06 RX ORDER — DILTIAZEM HYDROCHLORIDE 120 MG/1
120 CAPSULE, COATED, EXTENDED RELEASE ORAL DAILY
Qty: 90 CAPSULE | Refills: 0 | Status: SHIPPED | OUTPATIENT
Start: 2019-11-06 | End: 2019-12-06

## 2019-11-06 RX ORDER — AMLODIPINE BESYLATE 5 MG/1
5 TABLET ORAL DAILY
Qty: 90 TABLET | Refills: 0 | Status: SHIPPED | OUTPATIENT
Start: 2019-11-06 | End: 2020-02-10

## 2019-11-06 RX ORDER — TRIAMTERENE AND HYDROCHLOROTHIAZIDE 37.5; 25 MG/1; MG/1
1 CAPSULE ORAL EVERY MORNING
Qty: 90 CAPSULE | Refills: 0 | Status: SHIPPED | OUTPATIENT
Start: 2019-11-06 | End: 2020-02-10

## 2019-11-06 NOTE — PROGRESS NOTES
HPI:    Patient ID: Danish Jain is a 75year old male. Diabetes   He presents for his follow-up diabetic visit. He has type 2 diabetes mellitus. Onset time: >2 years. His disease course has been stable. Hypoglycemia symptoms include headaches.  Pert Review of Systems   Constitutional: Negative for activity change, appetite change, chills, diaphoresis, fatigue, fever, malaise/fatigue, unexpected weight change and weight loss.    HENT: Negative for congestion, ear pain, hearing loss, rhinorrhea, si 500 MG Oral Tab Take 1 tablet (500 mg total) by mouth 2 (two) times daily with meals. 180 tablet 0   • atorvastatin 20 MG Oral Tab Take 1 tablet (20 mg total) by mouth nightly.  90 tablet 2   • Clopidogrel Bisulfate 75 MG Oral Tab Take 1 tablet (75 mg total hyperlipidemia  - COMP METABOLIC PANEL (14); Standing  - LIPID PANEL; Standing    7. Normocytic anemia  - CBC, PLATELET; NO DIFFERENTIAL; Standing    8. Influenza vaccination declined by patient    9.  Encounter for colorectal cancer screening  - OCCULT BLO

## 2019-11-06 NOTE — PATIENT INSTRUCTIONS
Diabetes: Inspecting Your Feet    Diabetes increases your chances of foot problems. So inspect your feet every day. This helps you find small skin irritations before they become serious ulcers or infections.  If you have trouble seeing the bottoms of your · Cracks and sores are caused by dry or irritated skin. They are a sign that the skin is breaking down. This can lead to infection. · Toenail problems to watch for include nails growing into the skin (ingrown toenail). This can cause redness or pain.  Thic

## 2019-11-09 DIAGNOSIS — E11.29 CONTROLLED TYPE 2 DIABETES MELLITUS WITH MICROALBUMINURIA, WITHOUT LONG-TERM CURRENT USE OF INSULIN (HCC): ICD-10-CM

## 2019-11-09 DIAGNOSIS — R80.9 CONTROLLED TYPE 2 DIABETES MELLITUS WITH MICROALBUMINURIA, WITHOUT LONG-TERM CURRENT USE OF INSULIN (HCC): ICD-10-CM

## 2019-11-11 ENCOUNTER — TELEPHONE (OUTPATIENT)
Dept: FAMILY MEDICINE CLINIC | Facility: CLINIC | Age: 74
End: 2019-11-11

## 2019-11-11 NOTE — TELEPHONE ENCOUNTER
----- Message from Lorena Holden MD sent at 11/9/2019  9:29 AM CST -----  Continue Metformin 500 mg PO BID, medication e-prescribed. Needs repeat fasting BW in 3 months prior to next f/u appointment for Medicare annual physical exam.  OK to file.

## 2019-11-11 NOTE — TELEPHONE ENCOUNTER
Message left to call us back re labs form 11/06/19. Patient called back and results were discussed, he still has 2 bottles of metformin and for so he was going to call [facility]'s to put meds back and will call them once he runs out.   Cole Srinivasan is aware he

## 2019-12-06 ENCOUNTER — LAB ENCOUNTER (OUTPATIENT)
Dept: LAB | Facility: HOSPITAL | Age: 74
End: 2019-12-06
Attending: UROLOGY
Payer: MEDICARE

## 2019-12-06 ENCOUNTER — TELEPHONE (OUTPATIENT)
Dept: FAMILY MEDICINE CLINIC | Facility: CLINIC | Age: 74
End: 2019-12-06

## 2019-12-06 ENCOUNTER — OFFICE VISIT (OUTPATIENT)
Dept: SURGERY | Facility: CLINIC | Age: 74
End: 2019-12-06
Payer: MEDICARE

## 2019-12-06 VITALS
SYSTOLIC BLOOD PRESSURE: 134 MMHG | BODY MASS INDEX: 25.61 KG/M2 | DIASTOLIC BLOOD PRESSURE: 69 MMHG | WEIGHT: 150 LBS | HEIGHT: 64 IN | RESPIRATION RATE: 16 BRPM | HEART RATE: 80 BPM

## 2019-12-06 DIAGNOSIS — I10 ESSENTIAL HYPERTENSION: ICD-10-CM

## 2019-12-06 DIAGNOSIS — C61 PROSTATE CANCER (HCC): Primary | ICD-10-CM

## 2019-12-06 DIAGNOSIS — C61 PROSTATE CANCER (HCC): ICD-10-CM

## 2019-12-06 DIAGNOSIS — Z79.02 LONG TERM CURRENT USE OF CLOPIDOGREL: ICD-10-CM

## 2019-12-06 DIAGNOSIS — Z79.82 LONG TERM (CURRENT) USE OF ASPIRIN: ICD-10-CM

## 2019-12-06 DIAGNOSIS — N52.9 ERECTILE DYSFUNCTION, UNSPECIFIED ERECTILE DYSFUNCTION TYPE: ICD-10-CM

## 2019-12-06 DIAGNOSIS — I48.0 PAF (PAROXYSMAL ATRIAL FIBRILLATION) (HCC): ICD-10-CM

## 2019-12-06 DIAGNOSIS — I48.91 ATRIAL FIBRILLATION, UNSPECIFIED TYPE (HCC): ICD-10-CM

## 2019-12-06 DIAGNOSIS — R35.1 NOCTURIA: ICD-10-CM

## 2019-12-06 DIAGNOSIS — I47.1 PSVT (PAROXYSMAL SUPRAVENTRICULAR TACHYCARDIA) (HCC): ICD-10-CM

## 2019-12-06 PROCEDURE — 99214 OFFICE O/P EST MOD 30 MIN: CPT | Performed by: UROLOGY

## 2019-12-06 PROCEDURE — 84153 ASSAY OF PSA TOTAL: CPT

## 2019-12-06 PROCEDURE — 36415 COLL VENOUS BLD VENIPUNCTURE: CPT

## 2019-12-06 RX ORDER — DILTIAZEM HYDROCHLORIDE 120 MG/1
120 CAPSULE, COATED, EXTENDED RELEASE ORAL DAILY
Qty: 90 CAPSULE | Refills: 0 | Status: SHIPPED | OUTPATIENT
Start: 2019-12-06 | End: 2020-02-10

## 2019-12-06 NOTE — TELEPHONE ENCOUNTER
Pharmacy requested a refill on Cartia, a Rx was sent on 11/06/19 by Dr Bernie Farah but patient never went to pick it up, resending refill, ok per Dr Kendy Maldonado. Message left to inform patient.

## 2019-12-06 NOTE — PATIENT INSTRUCTIONS
Porfirio Waller M.D.    1.  Blood draw for PSA today     2. In light of ongoing urinating difficulties, urinalysis urine test today--we will notify you of the results    3.   Please return on 12/10/2019 for a 6-month Eligard/l

## 2019-12-06 NOTE — PROGRESS NOTES
HPI:    Patient ID: Ten Ocampo is a 75year old male. HPI    Prostate Cancer  Chronic. Diagnosed 5/24/18.  Clinical stage T1c N0  M0, James's 4+5, grade group 5, adenocarcinoma of prostate; started neoadjuvant ADT, and then had definitive externa patient states some urinary incontinence; he states both urge and stress incontinence. He does not wear pads and he does not wet his underwear.  He feels this is the same as last time.      Atrial Fibrillation  medical problems of atrial fibrillation; histo Aimee Mercado: James score 4+5=9; neoadujant hormonal treatment and then referred to radiation therapy; 8100 cGy utilizing 4500 cGy to the pelvis followed by a prostatic boost completed on 10/18/2018  11/26/2018 office visit with me; leuprolide eligard injection 37.5-25 MG Oral Cap Take 1 capsule by mouth every morning. 90 capsule 0   • Blood Glucose Monitoring Suppl ([employer] BLOOD GLUCOSE) w/Device Does not apply Kit 1 Device by Other route 2 (two) times daily. Use as directed.  1 kit 0   • Glucose Blood (RA TR sensation of not emptying your bladder completely after you finish urinating?: Half the time  Over the past month, how often have you had to urinate again less than 2 hours after you finished urinating?: Half the time  Over the past month, how often have y creatinine = 62.10  05/22/2019 PSA = <0.01  01/18/2019 PSA = 0.0  10/18/18 creatinine = 0.95; GFR > 60  2/17/18 PSA = 15.9  1/17/18 PSA = 17.8  10/11/17 PSA = 14.4  7/20/17 PSA = 11.6; UA: WBC = 9; RBC = 2; bacteria = few      IMAGING  6/15/18 MRI ABD (W+W showed that these lesions are cysts. The patient's first leuprolide eligard injection was 5/24/18  his most recent 6 month leuprolide eligard injection was on 6/10/19. Recent PSA not completed.  On 6/17/19, PSA was <0.01, stable compared to 5/22/19 PSA <0.0 procedures      I explained to patient the risks, side effects, and alternatives, and I answered questions concerning them; patient understands all of this and decides to proceed with the following:       Treatment Plan & Patient Instructions    1.   Blood

## 2019-12-10 ENCOUNTER — NURSE ONLY (OUTPATIENT)
Dept: SURGERY | Facility: CLINIC | Age: 74
End: 2019-12-10
Payer: MEDICARE

## 2019-12-10 DIAGNOSIS — C61 PROSTATIC ADENOCARCINOMA (HCC): ICD-10-CM

## 2019-12-10 PROCEDURE — 96402 CHEMO HORMON ANTINEOPL SQ/IM: CPT | Performed by: UROLOGY

## 2019-12-10 NOTE — PROGRESS NOTES
See 6/5/19 TE; prior auth. In that telephone encounter, noted that prior auth not needed for Eligard.

## 2019-12-27 ENCOUNTER — TELEPHONE (OUTPATIENT)
Dept: SURGERY | Facility: CLINIC | Age: 74
End: 2019-12-27

## 2019-12-27 NOTE — TELEPHONE ENCOUNTER
----- Message from GEORGINA Hinojosa sent at 12/10/2019 11:04 AM CST -----  Staff,    Please let patient know his PSA level is stable at < 0.01. Continue with plans as discussed with Dr. Minal Hammonds.     Thank you,  Johnna Del Valle

## 2019-12-27 NOTE — TELEPHONE ENCOUNTER
LM with PSA results as stated below in [facility] msg and told pt to c/b if he has questions.

## 2020-01-01 ENCOUNTER — TELEPHONE (OUTPATIENT)
Dept: FAMILY MEDICINE CLINIC | Facility: CLINIC | Age: 75
End: 2020-01-01

## 2020-01-01 ENCOUNTER — TELEPHONE (OUTPATIENT)
Dept: CASE MANAGEMENT | Age: 75
End: 2020-01-01

## 2020-01-14 ENCOUNTER — TELEPHONE (OUTPATIENT)
Dept: FAMILY MEDICINE CLINIC | Facility: CLINIC | Age: 75
End: 2020-01-14

## 2020-01-14 DIAGNOSIS — E11.29 CONTROLLED TYPE 2 DIABETES MELLITUS WITH MICROALBUMINURIA, WITHOUT LONG-TERM CURRENT USE OF INSULIN (HCC): ICD-10-CM

## 2020-01-14 DIAGNOSIS — R80.9 CONTROLLED TYPE 2 DIABETES MELLITUS WITH MICROALBUMINURIA, WITHOUT LONG-TERM CURRENT USE OF INSULIN (HCC): ICD-10-CM

## 2020-01-14 NOTE — TELEPHONE ENCOUNTER
A 30 day supply of the Metformin was authorized, patient has an appointment to see Dr Deloris Mcdonnell 02/06/2020.

## 2020-01-23 DIAGNOSIS — R97.20 ELEVATED PSA: ICD-10-CM

## 2020-01-23 DIAGNOSIS — C61 PROSTATIC ADENOCARCINOMA (HCC): ICD-10-CM

## 2020-01-23 RX ORDER — TAMSULOSIN HYDROCHLORIDE 0.4 MG/1
CAPSULE ORAL
Qty: 90 CAPSULE | Refills: 3 | Status: SHIPPED | OUTPATIENT
Start: 2020-01-23 | End: 2021-01-01

## 2020-01-23 NOTE — TELEPHONE ENCOUNTER
Pt LOV with Dr. Marcin Flaherty 12/6/19 pt pharmacy requesting refill on tamsulosin, I looked and read [facility] notes and finally found pt was given med by PCP, Dr. Karlos Diego I copied and pasted below.     Loco Campos MD   Medication Detail     Medication Quantity Refills S

## 2020-02-06 ENCOUNTER — LAB ENCOUNTER (OUTPATIENT)
Dept: LAB | Facility: REFERENCE LAB | Age: 75
End: 2020-02-06
Payer: MEDICARE

## 2020-02-06 DIAGNOSIS — R80.9 CONTROLLED TYPE 2 DIABETES MELLITUS WITH MICROALBUMINURIA, WITHOUT LONG-TERM CURRENT USE OF INSULIN (HCC): ICD-10-CM

## 2020-02-06 DIAGNOSIS — E04.2 MULTINODULAR THYROID: ICD-10-CM

## 2020-02-06 DIAGNOSIS — E11.29 CONTROLLED TYPE 2 DIABETES MELLITUS WITH MICROALBUMINURIA, WITHOUT LONG-TERM CURRENT USE OF INSULIN (HCC): ICD-10-CM

## 2020-02-06 DIAGNOSIS — D64.9 NORMOCYTIC ANEMIA: ICD-10-CM

## 2020-02-06 DIAGNOSIS — E78.2 MIXED HYPERLIPIDEMIA: ICD-10-CM

## 2020-02-06 LAB
ALBUMIN SERPL-MCNC: 3.3 G/DL (ref 3.4–5)
ALBUMIN/GLOB SERPL: 1 {RATIO} (ref 1–2)
ALP LIVER SERPL-CCNC: 105 U/L (ref 45–117)
ALT SERPL-CCNC: 14 U/L (ref 16–61)
ANION GAP SERPL CALC-SCNC: 6 MMOL/L (ref 0–18)
AST SERPL-CCNC: 12 U/L (ref 15–37)
BILIRUB SERPL-MCNC: 0.2 MG/DL (ref 0.1–2)
BILIRUB UR QL: NEGATIVE
BUN BLD-MCNC: 15 MG/DL (ref 7–18)
BUN/CREAT SERPL: 14.6 (ref 10–20)
CALCIUM BLD-MCNC: 8.9 MG/DL (ref 8.5–10.1)
CHLORIDE SERPL-SCNC: 111 MMOL/L (ref 98–112)
CHOLEST SMN-MCNC: 121 MG/DL (ref ?–200)
CLARITY UR: CLEAR
CO2 SERPL-SCNC: 25 MMOL/L (ref 21–32)
COLOR UR: YELLOW
CREAT BLD-MCNC: 1.03 MG/DL (ref 0.7–1.3)
CREAT UR-SCNC: 193 MG/DL
DEPRECATED RDW RBC AUTO: 53.1 FL (ref 35.1–46.3)
ERYTHROCYTE [DISTWIDTH] IN BLOOD BY AUTOMATED COUNT: 16.3 % (ref 11–15)
EST. AVERAGE GLUCOSE BLD GHB EST-MCNC: 137 MG/DL (ref 68–126)
GLOBULIN PLAS-MCNC: 3.3 G/DL (ref 2.8–4.4)
GLUCOSE BLD-MCNC: 115 MG/DL (ref 70–99)
GLUCOSE UR-MCNC: NEGATIVE MG/DL
HBA1C MFR BLD HPLC: 6.4 % (ref ?–5.7)
HCT VFR BLD AUTO: 33.6 % (ref 39–53)
HDLC SERPL-MCNC: 65 MG/DL (ref 40–59)
HGB BLD-MCNC: 10.6 G/DL (ref 13–17.5)
HGB UR QL STRIP.AUTO: NEGATIVE
HYALINE CASTS #/AREA URNS AUTO: 1 /LPF
KETONES UR-MCNC: NEGATIVE MG/DL
LDLC SERPL CALC-MCNC: 36 MG/DL (ref ?–100)
LEUKOCYTE ESTERASE UR QL STRIP.AUTO: NEGATIVE
M PROTEIN MFR SERPL ELPH: 6.6 G/DL (ref 6.4–8.2)
MCH RBC QN AUTO: 28.3 PG (ref 26–34)
MCHC RBC AUTO-ENTMCNC: 31.5 G/DL (ref 31–37)
MCV RBC AUTO: 89.6 FL (ref 80–100)
MICROALBUMIN UR-MCNC: 13 MG/DL
MICROALBUMIN/CREAT 24H UR-RTO: 67.4 UG/MG (ref ?–30)
NITRITE UR QL STRIP.AUTO: NEGATIVE
NONHDLC SERPL-MCNC: 56 MG/DL (ref ?–130)
OSMOLALITY SERPL CALC.SUM OF ELEC: 296 MOSM/KG (ref 275–295)
PATIENT FASTING Y/N/NP: YES
PATIENT FASTING Y/N/NP: YES
PH UR: 5 [PH] (ref 5–8)
PLATELET # BLD AUTO: 268 10(3)UL (ref 150–450)
POTASSIUM SERPL-SCNC: 4.3 MMOL/L (ref 3.5–5.1)
PROT UR-MCNC: 30 MG/DL
RBC # BLD AUTO: 3.75 X10(6)UL (ref 3.8–5.8)
RBC #/AREA URNS AUTO: <1 /HPF
SODIUM SERPL-SCNC: 142 MMOL/L (ref 136–145)
SP GR UR STRIP: 1.02 (ref 1–1.03)
T3FREE SERPL-MCNC: 2.26 PG/ML (ref 2.4–4.2)
T4 FREE SERPL-MCNC: 0.9 NG/DL (ref 0.8–1.7)
TRIGL SERPL-MCNC: 98 MG/DL (ref 30–149)
TSI SER-ACNC: 0.29 MIU/ML (ref 0.36–3.74)
UROBILINOGEN UR STRIP-ACNC: <2
VLDLC SERPL CALC-MCNC: 20 MG/DL (ref 0–30)
WBC # BLD AUTO: 5.7 X10(3) UL (ref 4–11)
WBC #/AREA URNS AUTO: 1 /HPF

## 2020-02-06 PROCEDURE — 36415 COLL VENOUS BLD VENIPUNCTURE: CPT

## 2020-02-06 PROCEDURE — 82043 UR ALBUMIN QUANTITATIVE: CPT

## 2020-02-06 PROCEDURE — 84439 ASSAY OF FREE THYROXINE: CPT

## 2020-02-06 PROCEDURE — 84481 FREE ASSAY (FT-3): CPT

## 2020-02-06 PROCEDURE — 82570 ASSAY OF URINE CREATININE: CPT

## 2020-02-06 PROCEDURE — 80053 COMPREHEN METABOLIC PANEL: CPT

## 2020-02-06 PROCEDURE — 80061 LIPID PANEL: CPT

## 2020-02-06 PROCEDURE — 85027 COMPLETE CBC AUTOMATED: CPT

## 2020-02-06 PROCEDURE — 81001 URINALYSIS AUTO W/SCOPE: CPT

## 2020-02-06 PROCEDURE — 84443 ASSAY THYROID STIM HORMONE: CPT

## 2020-02-06 PROCEDURE — 83036 HEMOGLOBIN GLYCOSYLATED A1C: CPT

## 2020-02-07 ENCOUNTER — TELEPHONE (OUTPATIENT)
Dept: FAMILY MEDICINE CLINIC | Facility: CLINIC | Age: 75
End: 2020-02-07

## 2020-02-07 DIAGNOSIS — I47.1 PSVT (PAROXYSMAL SUPRAVENTRICULAR TACHYCARDIA) (HCC): ICD-10-CM

## 2020-02-07 DIAGNOSIS — R80.9 MICROALBUMINURIA DUE TO TYPE 2 DIABETES MELLITUS (HCC): Primary | ICD-10-CM

## 2020-02-07 DIAGNOSIS — I69.30 HISTORY OF STROKE WITH RESIDUAL DEFICIT: ICD-10-CM

## 2020-02-07 DIAGNOSIS — I69.398 HOMONYMOUS HEMIANOPSIA FOLLOWING CEREBROVASCULAR ACCIDENT: ICD-10-CM

## 2020-02-07 DIAGNOSIS — E11.29 MICROALBUMINURIA DUE TO TYPE 2 DIABETES MELLITUS (HCC): Primary | ICD-10-CM

## 2020-02-07 DIAGNOSIS — R80.9 CONTROLLED TYPE 2 DIABETES MELLITUS WITH MICROALBUMINURIA, WITHOUT LONG-TERM CURRENT USE OF INSULIN (HCC): ICD-10-CM

## 2020-02-07 DIAGNOSIS — E04.2 MULTINODULAR THYROID: ICD-10-CM

## 2020-02-07 DIAGNOSIS — E78.2 MIXED HYPERLIPIDEMIA: ICD-10-CM

## 2020-02-07 DIAGNOSIS — I10 ESSENTIAL HYPERTENSION: ICD-10-CM

## 2020-02-07 DIAGNOSIS — I48.0 PAF (PAROXYSMAL ATRIAL FIBRILLATION) (HCC): ICD-10-CM

## 2020-02-07 DIAGNOSIS — H53.469 HOMONYMOUS HEMIANOPSIA FOLLOWING CEREBROVASCULAR ACCIDENT: ICD-10-CM

## 2020-02-07 DIAGNOSIS — E11.29 CONTROLLED TYPE 2 DIABETES MELLITUS WITH MICROALBUMINURIA, WITHOUT LONG-TERM CURRENT USE OF INSULIN (HCC): ICD-10-CM

## 2020-02-10 RX ORDER — ASPIRIN 81 MG/1
81 TABLET ORAL DAILY
Qty: 30 TABLET | Refills: 0 | Status: SHIPPED | OUTPATIENT
Start: 2020-02-10 | End: 2021-01-01

## 2020-02-10 RX ORDER — ATORVASTATIN CALCIUM 20 MG/1
20 TABLET, FILM COATED ORAL NIGHTLY
Qty: 30 TABLET | Refills: 0 | Status: SHIPPED | OUTPATIENT
Start: 2020-02-10 | End: 2020-04-16

## 2020-02-10 RX ORDER — DILTIAZEM HYDROCHLORIDE 120 MG/1
120 CAPSULE, COATED, EXTENDED RELEASE ORAL DAILY
Qty: 30 CAPSULE | Refills: 0 | Status: SHIPPED | OUTPATIENT
Start: 2020-02-10 | End: 2020-04-16

## 2020-02-10 RX ORDER — TRIAMTERENE AND HYDROCHLOROTHIAZIDE 37.5; 25 MG/1; MG/1
1 CAPSULE ORAL EVERY MORNING
Qty: 30 CAPSULE | Refills: 0 | Status: SHIPPED | OUTPATIENT
Start: 2020-02-10 | End: 2020-04-16

## 2020-02-10 RX ORDER — AMLODIPINE BESYLATE 5 MG/1
5 TABLET ORAL DAILY
Qty: 30 TABLET | Refills: 0 | Status: SHIPPED | OUTPATIENT
Start: 2020-02-10 | End: 2020-04-16

## 2020-02-10 RX ORDER — CLOPIDOGREL BISULFATE 75 MG/1
75 TABLET ORAL DAILY
Qty: 30 TABLET | Refills: 0 | Status: SHIPPED | OUTPATIENT
Start: 2020-02-10 | End: 2020-04-16

## 2020-02-10 NOTE — TELEPHONE ENCOUNTER
Dr Agustín Mohan prefers to have patient see Dr Vickii Felty regarding his thyroid and to see Dr Sarina Carrera regarding his abnormal microalbumin results. Venida Jade is to be continue everything the same and to be seen by Dr Agustín Mohan by next month.

## 2020-02-11 DIAGNOSIS — I10 ESSENTIAL HYPERTENSION: Primary | ICD-10-CM

## 2020-02-11 DIAGNOSIS — E11.29 CONTROLLED TYPE 2 DIABETES MELLITUS WITH MICROALBUMINURIA, WITHOUT LONG-TERM CURRENT USE OF INSULIN (HCC): ICD-10-CM

## 2020-02-11 DIAGNOSIS — R80.9 CONTROLLED TYPE 2 DIABETES MELLITUS WITH MICROALBUMINURIA, WITHOUT LONG-TERM CURRENT USE OF INSULIN (HCC): ICD-10-CM

## 2020-03-09 DIAGNOSIS — I69.398 HOMONYMOUS HEMIANOPSIA FOLLOWING CEREBROVASCULAR ACCIDENT: ICD-10-CM

## 2020-03-09 DIAGNOSIS — I10 ESSENTIAL HYPERTENSION: ICD-10-CM

## 2020-03-09 DIAGNOSIS — I48.0 PAF (PAROXYSMAL ATRIAL FIBRILLATION) (HCC): ICD-10-CM

## 2020-03-09 DIAGNOSIS — I47.10 PSVT (PAROXYSMAL SUPRAVENTRICULAR TACHYCARDIA): ICD-10-CM

## 2020-03-09 DIAGNOSIS — I69.30 HISTORY OF STROKE WITH RESIDUAL DEFICIT: ICD-10-CM

## 2020-03-09 DIAGNOSIS — H53.469 HOMONYMOUS HEMIANOPSIA FOLLOWING CEREBROVASCULAR ACCIDENT: ICD-10-CM

## 2020-03-09 RX ORDER — CLOPIDOGREL BISULFATE 75 MG/1
TABLET ORAL
Qty: 30 TABLET | Refills: 0 | OUTPATIENT
Start: 2020-03-09

## 2020-03-09 RX ORDER — TRIAMTERENE AND HYDROCHLOROTHIAZIDE 37.5; 25 MG/1; MG/1
1 CAPSULE ORAL EVERY MORNING
Qty: 30 CAPSULE | Refills: 0 | OUTPATIENT
Start: 2020-03-09

## 2020-03-09 RX ORDER — AMLODIPINE BESYLATE 5 MG/1
TABLET ORAL
Qty: 30 TABLET | Refills: 0 | OUTPATIENT
Start: 2020-03-09

## 2020-03-09 RX ORDER — DILTIAZEM HYDROCHLORIDE 120 MG/1
CAPSULE, COATED, EXTENDED RELEASE ORAL
Qty: 30 CAPSULE | Refills: 0 | OUTPATIENT
Start: 2020-03-09

## 2020-04-13 ENCOUNTER — TELEPHONE (OUTPATIENT)
Dept: FAMILY MEDICINE CLINIC | Facility: CLINIC | Age: 75
End: 2020-04-13

## 2020-04-13 DIAGNOSIS — I10 ESSENTIAL HYPERTENSION: ICD-10-CM

## 2020-04-14 RX ORDER — AMLODIPINE BESYLATE 5 MG/1
TABLET ORAL
Qty: 30 TABLET | Refills: 0 | OUTPATIENT
Start: 2020-04-14

## 2020-04-14 RX ORDER — TRIAMTERENE AND HYDROCHLOROTHIAZIDE 37.5; 25 MG/1; MG/1
1 CAPSULE ORAL EVERY MORNING
Qty: 30 CAPSULE | Refills: 0 | OUTPATIENT
Start: 2020-04-14

## 2020-04-16 ENCOUNTER — VIRTUAL PHONE E/M (OUTPATIENT)
Dept: FAMILY MEDICINE CLINIC | Facility: CLINIC | Age: 75
End: 2020-04-16
Payer: MEDICARE

## 2020-04-16 DIAGNOSIS — I69.398 HOMONYMOUS HEMIANOPSIA FOLLOWING CEREBROVASCULAR ACCIDENT: ICD-10-CM

## 2020-04-16 DIAGNOSIS — H53.469 HOMONYMOUS HEMIANOPSIA FOLLOWING CEREBROVASCULAR ACCIDENT: ICD-10-CM

## 2020-04-16 DIAGNOSIS — R80.9 CONTROLLED TYPE 2 DIABETES MELLITUS WITH MICROALBUMINURIA, WITHOUT LONG-TERM CURRENT USE OF INSULIN (HCC): ICD-10-CM

## 2020-04-16 DIAGNOSIS — I10 ESSENTIAL HYPERTENSION: ICD-10-CM

## 2020-04-16 DIAGNOSIS — I48.0 PAF (PAROXYSMAL ATRIAL FIBRILLATION) (HCC): ICD-10-CM

## 2020-04-16 DIAGNOSIS — E11.29 CONTROLLED TYPE 2 DIABETES MELLITUS WITH MICROALBUMINURIA, WITHOUT LONG-TERM CURRENT USE OF INSULIN (HCC): ICD-10-CM

## 2020-04-16 DIAGNOSIS — I47.1 PSVT (PAROXYSMAL SUPRAVENTRICULAR TACHYCARDIA) (HCC): ICD-10-CM

## 2020-04-16 DIAGNOSIS — I69.30 HISTORY OF STROKE WITH RESIDUAL DEFICIT: ICD-10-CM

## 2020-04-16 DIAGNOSIS — E78.2 MIXED HYPERLIPIDEMIA: ICD-10-CM

## 2020-04-16 PROCEDURE — 99441 PHONE E/M BY PHYS 5-10 MIN: CPT | Performed by: FAMILY MEDICINE

## 2020-04-16 RX ORDER — AMLODIPINE BESYLATE 5 MG/1
5 TABLET ORAL DAILY
Qty: 30 TABLET | Refills: 0 | Status: SHIPPED | OUTPATIENT
Start: 2020-04-16 | End: 2020-05-15

## 2020-04-16 RX ORDER — ATORVASTATIN CALCIUM 20 MG/1
20 TABLET, FILM COATED ORAL NIGHTLY
Qty: 30 TABLET | Refills: 0 | Status: SHIPPED | OUTPATIENT
Start: 2020-04-16 | End: 2020-05-15

## 2020-04-16 RX ORDER — CLOPIDOGREL BISULFATE 75 MG/1
75 TABLET ORAL DAILY
Qty: 30 TABLET | Refills: 0 | Status: SHIPPED | OUTPATIENT
Start: 2020-04-16 | End: 2020-05-15

## 2020-04-16 RX ORDER — DILTIAZEM HYDROCHLORIDE 120 MG/1
120 CAPSULE, COATED, EXTENDED RELEASE ORAL DAILY
Qty: 30 CAPSULE | Refills: 0 | Status: SHIPPED | OUTPATIENT
Start: 2020-04-16 | End: 2020-05-15

## 2020-04-16 RX ORDER — TRIAMTERENE AND HYDROCHLOROTHIAZIDE 37.5; 25 MG/1; MG/1
1 CAPSULE ORAL EVERY MORNING
Qty: 30 CAPSULE | Refills: 0 | Status: SHIPPED | OUTPATIENT
Start: 2020-04-16 | End: 2020-05-15

## 2020-04-16 NOTE — PROGRESS NOTES
Virtual Telephone Check-In    Ky Sophia verbally consents to a Virtual/Telephone Check-In visit on 04/16/20. Patient understands and accepts financial responsibility for any deductible, co-insurance and/or co-pays associated with this service. (500 mg total) by mouth 2 (two) times daily with meals. Diabetic control is well controlled. Last A1c value was 6.4% done 2/6/2020.     Recommendations are:   · Will CPM  · Advised weight and diabetic/lipid management with carbohydrate controlled ADA and/

## 2020-04-16 NOTE — TELEPHONE ENCOUNTER
PT called requesting at least for today, his BP medication. He is completely out  Pt has an appt tomorrow 04/17  Please call and advise.

## 2020-05-11 DIAGNOSIS — R80.9 CONTROLLED TYPE 2 DIABETES MELLITUS WITH MICROALBUMINURIA, WITHOUT LONG-TERM CURRENT USE OF INSULIN (HCC): ICD-10-CM

## 2020-05-11 DIAGNOSIS — E11.29 CONTROLLED TYPE 2 DIABETES MELLITUS WITH MICROALBUMINURIA, WITHOUT LONG-TERM CURRENT USE OF INSULIN (HCC): ICD-10-CM

## 2020-05-14 DIAGNOSIS — I48.0 PAF (PAROXYSMAL ATRIAL FIBRILLATION) (HCC): ICD-10-CM

## 2020-05-14 DIAGNOSIS — I69.30 HISTORY OF STROKE WITH RESIDUAL DEFICIT: ICD-10-CM

## 2020-05-14 DIAGNOSIS — I69.398 HOMONYMOUS HEMIANOPSIA FOLLOWING CEREBROVASCULAR ACCIDENT: ICD-10-CM

## 2020-05-14 DIAGNOSIS — H53.469 HOMONYMOUS HEMIANOPSIA FOLLOWING CEREBROVASCULAR ACCIDENT: ICD-10-CM

## 2020-05-14 DIAGNOSIS — I10 ESSENTIAL HYPERTENSION: ICD-10-CM

## 2020-05-14 DIAGNOSIS — I47.10 PSVT (PAROXYSMAL SUPRAVENTRICULAR TACHYCARDIA): ICD-10-CM

## 2020-05-14 DIAGNOSIS — E78.2 MIXED HYPERLIPIDEMIA: ICD-10-CM

## 2020-05-15 RX ORDER — ATORVASTATIN CALCIUM 20 MG/1
TABLET, FILM COATED ORAL
Qty: 30 TABLET | Refills: 0 | Status: SHIPPED | OUTPATIENT
Start: 2020-05-15

## 2020-05-15 RX ORDER — AMLODIPINE BESYLATE 5 MG/1
TABLET ORAL
Qty: 30 TABLET | Refills: 0 | Status: SHIPPED | OUTPATIENT
Start: 2020-05-15 | End: 2021-01-01

## 2020-05-15 RX ORDER — DILTIAZEM HYDROCHLORIDE 120 MG/1
CAPSULE, COATED, EXTENDED RELEASE ORAL
Qty: 30 CAPSULE | Refills: 0 | Status: SHIPPED | OUTPATIENT
Start: 2020-05-15 | End: 2021-01-01

## 2020-05-15 RX ORDER — CLOPIDOGREL BISULFATE 75 MG/1
TABLET ORAL
Qty: 30 TABLET | Refills: 0 | Status: ON HOLD | OUTPATIENT
Start: 2020-05-15 | End: 2021-01-01

## 2020-05-15 RX ORDER — ATORVASTATIN CALCIUM 20 MG/1
TABLET, FILM COATED ORAL
Qty: 30 TABLET | Refills: 0 | OUTPATIENT
Start: 2020-05-15

## 2020-05-15 RX ORDER — TRIAMTERENE AND HYDROCHLOROTHIAZIDE 37.5; 25 MG/1; MG/1
1 CAPSULE ORAL EVERY MORNING
Qty: 30 CAPSULE | Refills: 0 | Status: SHIPPED | OUTPATIENT
Start: 2020-05-15 | End: 2021-01-01

## 2020-06-12 DIAGNOSIS — I47.10 PSVT (PAROXYSMAL SUPRAVENTRICULAR TACHYCARDIA): ICD-10-CM

## 2020-06-12 DIAGNOSIS — I10 ESSENTIAL HYPERTENSION: ICD-10-CM

## 2020-06-12 DIAGNOSIS — I48.0 PAF (PAROXYSMAL ATRIAL FIBRILLATION) (HCC): ICD-10-CM

## 2020-06-12 DIAGNOSIS — I69.398 HOMONYMOUS HEMIANOPSIA FOLLOWING CEREBROVASCULAR ACCIDENT: ICD-10-CM

## 2020-06-12 DIAGNOSIS — I69.30 HISTORY OF STROKE WITH RESIDUAL DEFICIT: ICD-10-CM

## 2020-06-12 DIAGNOSIS — H53.469 HOMONYMOUS HEMIANOPSIA FOLLOWING CEREBROVASCULAR ACCIDENT: ICD-10-CM

## 2020-06-12 DIAGNOSIS — E78.2 MIXED HYPERLIPIDEMIA: ICD-10-CM

## 2020-06-12 RX ORDER — CLOPIDOGREL BISULFATE 75 MG/1
TABLET ORAL
Qty: 30 TABLET | Refills: 0 | OUTPATIENT
Start: 2020-06-12

## 2020-06-12 RX ORDER — AMLODIPINE BESYLATE 5 MG/1
TABLET ORAL
Qty: 30 TABLET | Refills: 0 | OUTPATIENT
Start: 2020-06-12

## 2020-06-12 RX ORDER — TRIAMTERENE AND HYDROCHLOROTHIAZIDE 37.5; 25 MG/1; MG/1
1 CAPSULE ORAL EVERY MORNING
Qty: 30 CAPSULE | Refills: 0 | OUTPATIENT
Start: 2020-06-12

## 2020-06-12 RX ORDER — ATORVASTATIN CALCIUM 20 MG/1
TABLET, FILM COATED ORAL
Qty: 30 TABLET | Refills: 0 | OUTPATIENT
Start: 2020-06-12

## 2020-06-12 RX ORDER — DILTIAZEM HYDROCHLORIDE 120 MG/1
CAPSULE, COATED, EXTENDED RELEASE ORAL
Qty: 30 CAPSULE | Refills: 0 | OUTPATIENT
Start: 2020-06-12

## 2020-06-14 DIAGNOSIS — E11.29 CONTROLLED TYPE 2 DIABETES MELLITUS WITH MICROALBUMINURIA, WITHOUT LONG-TERM CURRENT USE OF INSULIN: ICD-10-CM

## 2020-06-14 DIAGNOSIS — R80.9 CONTROLLED TYPE 2 DIABETES MELLITUS WITH MICROALBUMINURIA, WITHOUT LONG-TERM CURRENT USE OF INSULIN: ICD-10-CM

## 2020-07-08 ENCOUNTER — TELEPHONE (OUTPATIENT)
Dept: FAMILY MEDICINE CLINIC | Facility: CLINIC | Age: 75
End: 2020-07-08

## 2020-07-08 NOTE — TELEPHONE ENCOUNTER
Left message for patient to call back to schedule an arlene for medicare annual physical and follow up. Offered  An arlene with Dr. Denise Barr which he has soon available arlene.

## 2020-09-21 NOTE — TELEPHONE ENCOUNTER
Called and left voice mail for patient to schedule ma supervisit physical and due for follow up. Was supposed to follow up in May.

## 2020-10-21 NOTE — PROGRESS NOTES
[facility]    PATIENT'S NAME: Nilsa Kocher   RADIATION ONCOLOGIST: Andra Boxer.  Star Smith MD   PATIENT ACCOUNT #: [de-identified] LOCATION: [address] [place] [address] RECORD #: [id number] YOB: 1945   FOLLOW-UP DATE: 01/17/2019       RADIA bowel problems or issues and has not seen any blood in his stool. The remainder of his medical health has been stable. A PSA was drawn yesterday and was noted to be 0.01.     PHYSICAL EXAMINATION:    VITAL SIGNS:  On exam today, the patient is noted to 21-Oct-2020 11:29

## 2021-01-01 ENCOUNTER — TELEPHONE (OUTPATIENT)
Dept: CARDIOLOGY CLINIC | Facility: HOSPITAL | Age: 76
End: 2021-01-01

## 2021-01-01 ENCOUNTER — INITIAL APN SNF VISIT (OUTPATIENT)
Dept: INTERNAL MEDICINE CLINIC | Facility: SKILLED NURSING FACILITY | Age: 76
End: 2021-01-01

## 2021-01-01 ENCOUNTER — APPOINTMENT (OUTPATIENT)
Dept: GENERAL RADIOLOGY | Facility: HOSPITAL | Age: 76
DRG: 242 | End: 2021-01-01
Attending: INTERNAL MEDICINE
Payer: MEDICARE

## 2021-01-01 ENCOUNTER — TELEPHONE (OUTPATIENT)
Dept: FAMILY MEDICINE CLINIC | Facility: CLINIC | Age: 76
End: 2021-01-01

## 2021-01-01 ENCOUNTER — APPOINTMENT (OUTPATIENT)
Dept: CV DIAGNOSTICS | Facility: HOSPITAL | Age: 76
DRG: 242 | End: 2021-01-01
Attending: INTERNAL MEDICINE
Payer: MEDICARE

## 2021-01-01 ENCOUNTER — APPOINTMENT (OUTPATIENT)
Dept: CT IMAGING | Facility: HOSPITAL | Age: 76
DRG: 242 | End: 2021-01-01
Attending: EMERGENCY MEDICINE
Payer: MEDICARE

## 2021-01-01 ENCOUNTER — APPOINTMENT (OUTPATIENT)
Dept: ULTRASOUND IMAGING | Facility: HOSPITAL | Age: 76
DRG: 291 | End: 2021-01-01
Attending: HOSPITALIST
Payer: MEDICARE

## 2021-01-01 ENCOUNTER — HOSPITAL ENCOUNTER (INPATIENT)
Facility: HOSPITAL | Age: 76
LOS: 2 days | Discharge: NURSING, REHAB OR LONG-TERM CARE | DRG: 274 | End: 2021-01-01
Attending: EMERGENCY MEDICINE | Admitting: HOSPITALIST
Payer: MEDICARE

## 2021-01-01 ENCOUNTER — APPOINTMENT (OUTPATIENT)
Dept: MRI IMAGING | Facility: HOSPITAL | Age: 76
DRG: 242 | End: 2021-01-01
Attending: Other
Payer: MEDICARE

## 2021-01-01 ENCOUNTER — PATIENT OUTREACH (OUTPATIENT)
Dept: CASE MANAGEMENT | Age: 76
End: 2021-01-01

## 2021-01-01 ENCOUNTER — APPOINTMENT (OUTPATIENT)
Dept: INTERVENTIONAL RADIOLOGY/VASCULAR | Facility: HOSPITAL | Age: 76
DRG: 242 | End: 2021-01-01
Attending: INTERNAL MEDICINE
Payer: MEDICARE

## 2021-01-01 ENCOUNTER — APPOINTMENT (OUTPATIENT)
Dept: CV DIAGNOSTICS | Facility: HOSPITAL | Age: 76
DRG: 291 | End: 2021-01-01
Attending: NURSE PRACTITIONER
Payer: MEDICARE

## 2021-01-01 ENCOUNTER — APPOINTMENT (OUTPATIENT)
Dept: PICC SERVICES | Facility: HOSPITAL | Age: 76
DRG: 242 | End: 2021-01-01
Attending: INTERNAL MEDICINE
Payer: MEDICARE

## 2021-01-01 ENCOUNTER — APPOINTMENT (OUTPATIENT)
Dept: PICC SERVICES | Facility: HOSPITAL | Age: 76
DRG: 291 | End: 2021-01-01
Attending: HOSPITALIST
Payer: MEDICARE

## 2021-01-01 ENCOUNTER — APPOINTMENT (OUTPATIENT)
Dept: INTERVENTIONAL RADIOLOGY/VASCULAR | Facility: HOSPITAL | Age: 76
DRG: 274 | End: 2021-01-01
Attending: INTERNAL MEDICINE
Payer: MEDICARE

## 2021-01-01 ENCOUNTER — MED REC SCAN ONLY (OUTPATIENT)
Dept: GASTROENTEROLOGY | Facility: CLINIC | Age: 76
End: 2021-01-01

## 2021-01-01 ENCOUNTER — OFFICE VISIT (OUTPATIENT)
Dept: FAMILY MEDICINE CLINIC | Facility: CLINIC | Age: 76
End: 2021-01-01
Payer: MEDICARE

## 2021-01-01 ENCOUNTER — ANESTHESIA (OUTPATIENT)
Dept: INTERVENTIONAL RADIOLOGY/VASCULAR | Facility: HOSPITAL | Age: 76
DRG: 242 | End: 2021-01-01
Payer: MEDICARE

## 2021-01-01 ENCOUNTER — HOSPITAL ENCOUNTER (INPATIENT)
Facility: HOSPITAL | Age: 76
LOS: 12 days | Discharge: NURSING, REHAB OR LONG-TERM CARE | DRG: 242 | End: 2021-01-01
Attending: EMERGENCY MEDICINE | Admitting: HOSPITALIST
Payer: MEDICARE

## 2021-01-01 ENCOUNTER — ANESTHESIA EVENT (OUTPATIENT)
Dept: ENDOSCOPY | Facility: HOSPITAL | Age: 76
DRG: 291 | End: 2021-01-01
Payer: MEDICARE

## 2021-01-01 ENCOUNTER — HOSPITAL ENCOUNTER (INPATIENT)
Dept: GENERAL RADIOLOGY | Facility: HOSPITAL | Age: 76
Discharge: HOME | DRG: 242 | End: 2021-01-01
Attending: INTERNAL MEDICINE
Payer: MEDICARE

## 2021-01-01 ENCOUNTER — APPOINTMENT (OUTPATIENT)
Dept: CT IMAGING | Facility: HOSPITAL | Age: 76
DRG: 291 | End: 2021-01-01
Attending: INTERNAL MEDICINE
Payer: MEDICARE

## 2021-01-01 ENCOUNTER — TELEPHONE (OUTPATIENT)
Dept: INTERNAL MEDICINE CLINIC | Facility: CLINIC | Age: 76
End: 2021-01-01

## 2021-01-01 ENCOUNTER — APPOINTMENT (OUTPATIENT)
Dept: GENERAL RADIOLOGY | Facility: HOSPITAL | Age: 76
DRG: 291 | End: 2021-01-01
Attending: HOSPITALIST
Payer: MEDICARE

## 2021-01-01 ENCOUNTER — EXTERNAL RECORD (OUTPATIENT)
Dept: HEALTH INFORMATION MANAGEMENT | Facility: OTHER | Age: 76
End: 2021-01-01

## 2021-01-01 ENCOUNTER — ANESTHESIA (OUTPATIENT)
Dept: ENDOSCOPY | Facility: HOSPITAL | Age: 76
DRG: 291 | End: 2021-01-01
Payer: MEDICARE

## 2021-01-01 ENCOUNTER — APPOINTMENT (OUTPATIENT)
Dept: GENERAL RADIOLOGY | Facility: HOSPITAL | Age: 76
DRG: 242 | End: 2021-01-01
Attending: EMERGENCY MEDICINE
Payer: MEDICARE

## 2021-01-01 ENCOUNTER — APPOINTMENT (OUTPATIENT)
Dept: CV DIAGNOSTICS | Facility: HOSPITAL | Age: 76
DRG: 291 | End: 2021-01-01
Attending: INTERNAL MEDICINE
Payer: MEDICARE

## 2021-01-01 ENCOUNTER — APPOINTMENT (OUTPATIENT)
Dept: GENERAL RADIOLOGY | Facility: HOSPITAL | Age: 76
DRG: 291 | End: 2021-01-01
Attending: EMERGENCY MEDICINE
Payer: MEDICARE

## 2021-01-01 ENCOUNTER — HOSPITAL ENCOUNTER (INPATIENT)
Facility: HOSPITAL | Age: 76
LOS: 8 days | Discharge: NURSING, REHAB OR LONG-TERM CARE | DRG: 291 | End: 2021-01-01
Attending: EMERGENCY MEDICINE | Admitting: HOSPITALIST
Payer: MEDICARE

## 2021-01-01 ENCOUNTER — HOSPITAL ENCOUNTER (INPATIENT)
Facility: HOSPITAL | Age: 76
LOS: 6 days | Discharge: HOME HEALTH | DRG: 291 | End: 2021-01-01
Attending: EMERGENCY MEDICINE | Admitting: HOSPITALIST
Payer: MEDICARE

## 2021-01-01 ENCOUNTER — EXTERNAL FACILITY (OUTPATIENT)
Dept: INTERNAL MEDICINE CLINIC | Facility: CLINIC | Age: 76
End: 2021-01-01

## 2021-01-01 ENCOUNTER — OFFICE VISIT (OUTPATIENT)
Dept: CARDIOLOGY CLINIC | Facility: HOSPITAL | Age: 76
End: 2021-01-01
Attending: NURSE PRACTITIONER
Payer: MEDICARE

## 2021-01-01 ENCOUNTER — APPOINTMENT (OUTPATIENT)
Dept: GENERAL RADIOLOGY | Facility: HOSPITAL | Age: 76
DRG: 274 | End: 2021-01-01
Attending: EMERGENCY MEDICINE
Payer: MEDICARE

## 2021-01-01 VITALS
SYSTOLIC BLOOD PRESSURE: 106 MMHG | WEIGHT: 153.38 LBS | OXYGEN SATURATION: 95 % | TEMPERATURE: 98 F | HEART RATE: 64 BPM | HEIGHT: 67.32 IN | RESPIRATION RATE: 16 BRPM | BODY MASS INDEX: 23.79 KG/M2 | DIASTOLIC BLOOD PRESSURE: 54 MMHG

## 2021-01-01 VITALS
SYSTOLIC BLOOD PRESSURE: 122 MMHG | WEIGHT: 151 LBS | BODY MASS INDEX: 23 KG/M2 | OXYGEN SATURATION: 97 % | HEART RATE: 105 BPM | DIASTOLIC BLOOD PRESSURE: 58 MMHG

## 2021-01-01 VITALS
WEIGHT: 99.88 LBS | HEART RATE: 70 BPM | BODY MASS INDEX: 17.05 KG/M2 | TEMPERATURE: 98 F | HEIGHT: 64 IN | SYSTOLIC BLOOD PRESSURE: 131 MMHG | DIASTOLIC BLOOD PRESSURE: 53 MMHG | OXYGEN SATURATION: 95 % | RESPIRATION RATE: 20 BRPM

## 2021-01-01 VITALS
DIASTOLIC BLOOD PRESSURE: 56 MMHG | SYSTOLIC BLOOD PRESSURE: 120 MMHG | HEART RATE: 67 BPM | BODY MASS INDEX: 23 KG/M2 | OXYGEN SATURATION: 98 % | WEIGHT: 132 LBS

## 2021-01-01 VITALS
BODY MASS INDEX: 19.87 KG/M2 | TEMPERATURE: 99 F | HEART RATE: 101 BPM | HEIGHT: 64 IN | SYSTOLIC BLOOD PRESSURE: 123 MMHG | OXYGEN SATURATION: 96 % | WEIGHT: 116.38 LBS | RESPIRATION RATE: 16 BRPM | DIASTOLIC BLOOD PRESSURE: 71 MMHG

## 2021-01-01 VITALS
SYSTOLIC BLOOD PRESSURE: 116 MMHG | HEART RATE: 70 BPM | OXYGEN SATURATION: 95 % | DIASTOLIC BLOOD PRESSURE: 50 MMHG | BODY MASS INDEX: 24 KG/M2 | WEIGHT: 152 LBS

## 2021-01-01 VITALS
SYSTOLIC BLOOD PRESSURE: 129 MMHG | DIASTOLIC BLOOD PRESSURE: 56 MMHG | TEMPERATURE: 98 F | WEIGHT: 131.38 LBS | BODY MASS INDEX: 22.43 KG/M2 | HEIGHT: 64 IN | RESPIRATION RATE: 18 BRPM | HEART RATE: 63 BPM | OXYGEN SATURATION: 93 %

## 2021-01-01 DIAGNOSIS — I48.91 ATRIAL FIBRILLATION WITH RAPID VENTRICULAR RESPONSE (HCC): ICD-10-CM

## 2021-01-01 DIAGNOSIS — I48.0 PAF (PAROXYSMAL ATRIAL FIBRILLATION) (HCC): ICD-10-CM

## 2021-01-01 DIAGNOSIS — E44.0 MODERATE PROTEIN-CALORIE MALNUTRITION (HCC): ICD-10-CM

## 2021-01-01 DIAGNOSIS — I48.91 ATRIAL FIBRILLATION WITH RVR (HCC): Primary | ICD-10-CM

## 2021-01-01 DIAGNOSIS — I10 ELEVATED HEART RATE WITH ELEVATED BLOOD PRESSURE AND DIAGNOSIS OF HYPERTENSION: ICD-10-CM

## 2021-01-01 DIAGNOSIS — R13.10 DYSPHAGIA, UNSPECIFIED TYPE: ICD-10-CM

## 2021-01-01 DIAGNOSIS — I47.2 V-TACH (HCC): ICD-10-CM

## 2021-01-01 DIAGNOSIS — R06.00 DYSPNEA, UNSPECIFIED TYPE: ICD-10-CM

## 2021-01-01 DIAGNOSIS — I49.01 VENTRICULAR FIBRILLATION (HCC): ICD-10-CM

## 2021-01-01 DIAGNOSIS — R00.9 ELEVATED HEART RATE WITH ELEVATED BLOOD PRESSURE AND DIAGNOSIS OF HYPERTENSION: ICD-10-CM

## 2021-01-01 DIAGNOSIS — I82.413 ACUTE BILATERAL DEEP VEIN THROMBOSIS (DVT) OF FEMORAL VEINS (HCC): ICD-10-CM

## 2021-01-01 DIAGNOSIS — I50.9 ACUTE ON CHRONIC CONGESTIVE HEART FAILURE, UNSPECIFIED HEART FAILURE TYPE (HCC): Primary | ICD-10-CM

## 2021-01-01 DIAGNOSIS — K62.5 RECTAL BLEEDING: ICD-10-CM

## 2021-01-01 DIAGNOSIS — I50.23 ACUTE ON CHRONIC SYSTOLIC CONGESTIVE HEART FAILURE (HCC): ICD-10-CM

## 2021-01-01 DIAGNOSIS — D64.9 ANEMIA, UNSPECIFIED TYPE: ICD-10-CM

## 2021-01-01 DIAGNOSIS — N40.0 BENIGN PROSTATIC HYPERPLASIA WITHOUT LOWER URINARY TRACT SYMPTOMS: ICD-10-CM

## 2021-01-01 DIAGNOSIS — E78.2 MIXED HYPERLIPIDEMIA: ICD-10-CM

## 2021-01-01 DIAGNOSIS — I10 ESSENTIAL HYPERTENSION: ICD-10-CM

## 2021-01-01 DIAGNOSIS — K92.1 HEMATOCHEZIA: ICD-10-CM

## 2021-01-01 DIAGNOSIS — Z79.4 TYPE 2 DIABETES MELLITUS WITH RETINOPATHY, WITH LONG-TERM CURRENT USE OF INSULIN, MACULAR EDEMA PRESENCE UNSPECIFIED, UNSPECIFIED LATERALITY, UNSPECIFIED RETINOPATHY SEVERITY (HCC): ICD-10-CM

## 2021-01-01 DIAGNOSIS — R80.9 CONTROLLED TYPE 2 DIABETES MELLITUS WITH MICROALBUMINURIA, WITHOUT LONG-TERM CURRENT USE OF INSULIN (HCC): ICD-10-CM

## 2021-01-01 DIAGNOSIS — I50.9 ACUTE ON CHRONIC CONGESTIVE HEART FAILURE, UNSPECIFIED HEART FAILURE TYPE (HCC): ICD-10-CM

## 2021-01-01 DIAGNOSIS — I46.9 CARDIAC ARREST (HCC): ICD-10-CM

## 2021-01-01 DIAGNOSIS — E11.29 CONTROLLED TYPE 2 DIABETES MELLITUS WITH MICROALBUMINURIA, WITHOUT LONG-TERM CURRENT USE OF INSULIN (HCC): ICD-10-CM

## 2021-01-01 DIAGNOSIS — Z95.810 ICD (IMPLANTABLE CARDIOVERTER-DEFIBRILLATOR) IN PLACE: ICD-10-CM

## 2021-01-01 DIAGNOSIS — I46.9 CARDIAC ARREST (HCC): Primary | ICD-10-CM

## 2021-01-01 DIAGNOSIS — R60.0 LEG EDEMA: ICD-10-CM

## 2021-01-01 DIAGNOSIS — E11.319 TYPE 2 DIABETES MELLITUS WITH RETINOPATHY, WITH LONG-TERM CURRENT USE OF INSULIN, MACULAR EDEMA PRESENCE UNSPECIFIED, UNSPECIFIED LATERALITY, UNSPECIFIED RETINOPATHY SEVERITY (HCC): ICD-10-CM

## 2021-01-01 DIAGNOSIS — I47.1 PSVT (PAROXYSMAL SUPRAVENTRICULAR TACHYCARDIA) (HCC): Primary | ICD-10-CM

## 2021-01-01 DIAGNOSIS — R06.02 SOB (SHORTNESS OF BREATH): Primary | ICD-10-CM

## 2021-01-01 DIAGNOSIS — E87.0 HYPERNATREMIA: ICD-10-CM

## 2021-01-01 DIAGNOSIS — R77.8 ELEVATED TROPONIN: ICD-10-CM

## 2021-01-01 DIAGNOSIS — I48.91 ATRIAL FIBRILLATION, UNSPECIFIED TYPE (HCC): ICD-10-CM

## 2021-01-01 DIAGNOSIS — Z23 NEED FOR VACCINATION: ICD-10-CM

## 2021-01-01 DIAGNOSIS — I50.9 HEART FAILURE, UNSPECIFIED (HCC): ICD-10-CM

## 2021-01-01 DIAGNOSIS — D50.8 OTHER IRON DEFICIENCY ANEMIA: ICD-10-CM

## 2021-01-01 DIAGNOSIS — I50.22 CHRONIC SYSTOLIC (CONGESTIVE) HEART FAILURE (HCC): ICD-10-CM

## 2021-01-01 PROBLEM — I47.20 V-TACH (HCC): Status: ACTIVE | Noted: 2021-01-01

## 2021-01-01 LAB
ALBUMIN SERPL-MCNC: 2.5 G/DL (ref 3.4–5)
ALBUMIN SERPL-MCNC: 2.6 G/DL (ref 3.4–5)
ALBUMIN SERPL-MCNC: 2.9 G/DL (ref 3.4–5)
ALBUMIN/GLOB SERPL: 0.8 {RATIO} (ref 1–2)
ALBUMIN/GLOB SERPL: 1 {RATIO} (ref 1–2)
ALBUMIN/GLOB SERPL: 1 {RATIO} (ref 1–2)
ALP LIVER SERPL-CCNC: 113 U/L
ALP LIVER SERPL-CCNC: 80 U/L
ALP LIVER SERPL-CCNC: 97 U/L
ALT SERPL-CCNC: 12 U/L
ALT SERPL-CCNC: 65 U/L
ALT SERPL-CCNC: 83 U/L
ANION GAP SERPL CALC-SCNC: 10 MMOL/L (ref 0–18)
ANION GAP SERPL CALC-SCNC: 12 MMOL/L (ref 0–18)
ANION GAP SERPL CALC-SCNC: 2 MMOL/L (ref 0–18)
ANION GAP SERPL CALC-SCNC: 3 MMOL/L (ref 0–18)
ANION GAP SERPL CALC-SCNC: 4 MMOL/L (ref 0–18)
ANION GAP SERPL CALC-SCNC: 5 MMOL/L (ref 0–18)
ANION GAP SERPL CALC-SCNC: 6 MMOL/L (ref 0–18)
ANION GAP SERPL CALC-SCNC: 7 MMOL/L (ref 0–18)
ANION GAP SERPL CALC-SCNC: 7 MMOL/L (ref 0–18)
ANTIBODY SCREEN: NEGATIVE
APTT PPP: 110.8 SECONDS (ref 23.2–35.3)
APTT PPP: 156.2 SECONDS (ref 23.2–35.3)
APTT PPP: 158.7 SECONDS (ref 23.2–35.3)
APTT PPP: 32.1 SECONDS (ref 23.2–35.3)
APTT PPP: 34 SECONDS (ref 23.2–35.3)
APTT PPP: 38.1 SECONDS (ref 23.2–35.3)
APTT PPP: 38.8 SECONDS (ref 23.2–35.3)
APTT PPP: 42.8 SECONDS (ref 23.2–35.3)
APTT PPP: 42.9 SECONDS (ref 23.2–35.3)
APTT PPP: 43.7 SECONDS (ref 23.2–35.3)
APTT PPP: 45.3 SECONDS (ref 23.2–35.3)
APTT PPP: 48.3 SECONDS (ref 23.2–35.3)
APTT PPP: 49 SECONDS (ref 23.2–35.3)
APTT PPP: 49.5 SECONDS (ref 23.2–35.3)
APTT PPP: 52.3 SECONDS (ref 23.2–35.3)
APTT PPP: 52.4 SECONDS (ref 23.2–35.3)
APTT PPP: 56.6 SECONDS (ref 23.2–35.3)
APTT PPP: 57.3 SECONDS (ref 23.2–35.3)
APTT PPP: 60.4 SECONDS (ref 23.2–35.3)
APTT PPP: 69.6 SECONDS (ref 23.2–35.3)
APTT PPP: 78.6 SECONDS (ref 23.2–35.3)
APTT PPP: 78.7 SECONDS (ref 23.2–35.3)
APTT PPP: 80.6 SECONDS (ref 23.2–35.3)
AST SERPL-CCNC: 7 U/L (ref 15–37)
AST SERPL-CCNC: 71 U/L (ref 15–37)
AST SERPL-CCNC: 75 U/L (ref 15–37)
BASE EXCESS BLD CALC-SCNC: -1.3 MMOL/L (ref ?–2)
BASE EXCESS BLD CALC-SCNC: -9 MMOL/L (ref ?–2)
BASE EXCESS BLD CALC-SCNC: 2.8 MMOL/L (ref ?–2)
BASE EXCESS BLD CALC-SCNC: 23.6 MMOL/L (ref ?–2)
BASOPHILS # BLD AUTO: 0.01 X10(3) UL (ref 0–0.2)
BASOPHILS # BLD AUTO: 0.02 X10(3) UL (ref 0–0.2)
BASOPHILS # BLD AUTO: 0.03 X10(3) UL (ref 0–0.2)
BASOPHILS # BLD AUTO: 0.04 X10(3) UL (ref 0–0.2)
BASOPHILS NFR BLD AUTO: 0.1 %
BASOPHILS NFR BLD AUTO: 0.2 %
BASOPHILS NFR BLD AUTO: 0.3 %
BASOPHILS NFR BLD AUTO: 0.4 %
BASOPHILS NFR BLD AUTO: 0.5 %
BASOPHILS NFR BLD AUTO: 0.5 %
BASOPHILS NFR BLD AUTO: 0.6 %
BASOPHILS NFR BLD AUTO: 0.7 %
BILIRUB SERPL-MCNC: 0.4 MG/DL (ref 0.1–2)
BILIRUB SERPL-MCNC: 0.4 MG/DL (ref 0.1–2)
BILIRUB SERPL-MCNC: 0.5 MG/DL (ref 0.1–2)
BILIRUB UR QL: NEGATIVE
BILIRUB UR QL: NEGATIVE
BLOOD TYPE BARCODE: 5100
BUN BLD-MCNC: 11 MG/DL (ref 7–18)
BUN BLD-MCNC: 12 MG/DL (ref 7–18)
BUN BLD-MCNC: 12 MG/DL (ref 7–18)
BUN BLD-MCNC: 13 MG/DL (ref 7–18)
BUN BLD-MCNC: 13 MG/DL (ref 7–18)
BUN BLD-MCNC: 14 MG/DL (ref 7–18)
BUN BLD-MCNC: 15 MG/DL (ref 7–18)
BUN BLD-MCNC: 16 MG/DL (ref 7–18)
BUN BLD-MCNC: 18 MG/DL (ref 7–18)
BUN BLD-MCNC: 19 MG/DL (ref 7–18)
BUN BLD-MCNC: 22 MG/DL (ref 7–18)
BUN BLD-MCNC: 22 MG/DL (ref 7–18)
BUN BLD-MCNC: 23 MG/DL (ref 7–18)
BUN BLD-MCNC: 23 MG/DL (ref 7–18)
BUN BLD-MCNC: 24 MG/DL (ref 7–18)
BUN BLD-MCNC: 25 MG/DL (ref 7–18)
BUN BLD-MCNC: 26 MG/DL (ref 7–18)
BUN BLD-MCNC: 27 MG/DL (ref 7–18)
BUN BLD-MCNC: 31 MG/DL (ref 7–18)
BUN BLD-MCNC: 32 MG/DL (ref 7–18)
BUN/CREAT SERPL: 11.9 (ref 10–20)
BUN/CREAT SERPL: 12.2 (ref 10–20)
BUN/CREAT SERPL: 12.8 (ref 10–20)
BUN/CREAT SERPL: 13.3 (ref 10–20)
BUN/CREAT SERPL: 13.5 (ref 10–20)
BUN/CREAT SERPL: 14.3 (ref 10–20)
BUN/CREAT SERPL: 14.3 (ref 10–20)
BUN/CREAT SERPL: 14.8 (ref 10–20)
BUN/CREAT SERPL: 14.8 (ref 10–20)
BUN/CREAT SERPL: 15.5 (ref 10–20)
BUN/CREAT SERPL: 15.8 (ref 10–20)
BUN/CREAT SERPL: 16.4 (ref 10–20)
BUN/CREAT SERPL: 16.7 (ref 10–20)
BUN/CREAT SERPL: 16.9 (ref 10–20)
BUN/CREAT SERPL: 17.4 (ref 10–20)
BUN/CREAT SERPL: 17.8 (ref 10–20)
BUN/CREAT SERPL: 17.8 (ref 10–20)
BUN/CREAT SERPL: 17.9 (ref 10–20)
BUN/CREAT SERPL: 18 (ref 10–20)
BUN/CREAT SERPL: 18.1 (ref 10–20)
BUN/CREAT SERPL: 18.6 (ref 10–20)
BUN/CREAT SERPL: 18.8 (ref 10–20)
BUN/CREAT SERPL: 19.7 (ref 10–20)
BUN/CREAT SERPL: 20.7 (ref 10–20)
BUN/CREAT SERPL: 22.1 (ref 10–20)
BUN/CREAT SERPL: 23.5 (ref 10–20)
BUN/CREAT SERPL: 26.8 (ref 10–20)
BUN/CREAT SERPL: 28.1 (ref 10–20)
BUN/CREAT SERPL: 29 (ref 10–20)
BUN/CREAT SERPL: 29.5 (ref 10–20)
BUN/CREAT SERPL: 37.1 (ref 10–20)
BUN/CREAT SERPL: 39.3 (ref 10–20)
BUN/CREAT SERPL: 45.8 (ref 10–20)
BUN/CREAT SERPL: 49 (ref 10–20)
BUN/CREAT SERPL: 5.5 (ref 10–20)
BUN/CREAT SERPL: 59.6 (ref 10–20)
BUN/CREAT SERPL: 66.7 (ref 10–20)
C DIFF TOX B STL QL: NEGATIVE
CA-I BLD-SCNC: 1.2 MMOL/L (ref 0.95–1.32)
CALCIUM BLD-MCNC: 10 MG/DL (ref 8.5–10.1)
CALCIUM BLD-MCNC: 10.3 MG/DL (ref 8.5–10.1)
CALCIUM BLD-MCNC: 7.8 MG/DL (ref 8.5–10.1)
CALCIUM BLD-MCNC: 8.1 MG/DL (ref 8.5–10.1)
CALCIUM BLD-MCNC: 8.2 MG/DL (ref 8.5–10.1)
CALCIUM BLD-MCNC: 8.3 MG/DL (ref 8.5–10.1)
CALCIUM BLD-MCNC: 8.3 MG/DL (ref 8.5–10.1)
CALCIUM BLD-MCNC: 8.4 MG/DL (ref 8.5–10.1)
CALCIUM BLD-MCNC: 8.4 MG/DL (ref 8.5–10.1)
CALCIUM BLD-MCNC: 8.5 MG/DL (ref 8.5–10.1)
CALCIUM BLD-MCNC: 8.5 MG/DL (ref 8.5–10.1)
CALCIUM BLD-MCNC: 8.6 MG/DL (ref 8.5–10.1)
CALCIUM BLD-MCNC: 8.6 MG/DL (ref 8.5–10.1)
CALCIUM BLD-MCNC: 8.7 MG/DL (ref 8.5–10.1)
CALCIUM BLD-MCNC: 8.8 MG/DL (ref 8.5–10.1)
CALCIUM BLD-MCNC: 8.9 MG/DL (ref 8.5–10.1)
CALCIUM BLD-MCNC: 9 MG/DL (ref 8.5–10.1)
CALCIUM BLD-MCNC: 9.2 MG/DL (ref 8.5–10.1)
CALCIUM BLD-MCNC: 9.3 MG/DL (ref 8.5–10.1)
CALCIUM BLD-MCNC: 9.5 MG/DL (ref 8.5–10.1)
CALCIUM BLD-MCNC: 9.6 MG/DL (ref 8.5–10.1)
CALCIUM BLD-MCNC: 9.6 MG/DL (ref 8.5–10.1)
CALCIUM BLD-MCNC: 9.7 MG/DL (ref 8.5–10.1)
CHLORIDE SERPL-SCNC: 100 MMOL/L (ref 98–112)
CHLORIDE SERPL-SCNC: 100 MMOL/L (ref 98–112)
CHLORIDE SERPL-SCNC: 101 MMOL/L (ref 98–112)
CHLORIDE SERPL-SCNC: 103 MMOL/L (ref 98–112)
CHLORIDE SERPL-SCNC: 104 MMOL/L (ref 98–112)
CHLORIDE SERPL-SCNC: 104 MMOL/L (ref 98–112)
CHLORIDE SERPL-SCNC: 105 MMOL/L (ref 98–112)
CHLORIDE SERPL-SCNC: 106 MMOL/L (ref 98–112)
CHLORIDE SERPL-SCNC: 107 MMOL/L (ref 98–112)
CHLORIDE SERPL-SCNC: 108 MMOL/L (ref 98–112)
CHLORIDE SERPL-SCNC: 110 MMOL/L (ref 98–112)
CHLORIDE SERPL-SCNC: 110 MMOL/L (ref 98–112)
CHLORIDE SERPL-SCNC: 111 MMOL/L (ref 98–112)
CHLORIDE SERPL-SCNC: 112 MMOL/L (ref 98–112)
CHLORIDE SERPL-SCNC: 112 MMOL/L (ref 98–112)
CHLORIDE SERPL-SCNC: 113 MMOL/L (ref 98–112)
CHLORIDE SERPL-SCNC: 113 MMOL/L (ref 98–112)
CHLORIDE SERPL-SCNC: 114 MMOL/L (ref 98–112)
CHLORIDE SERPL-SCNC: 115 MMOL/L (ref 98–112)
CHLORIDE SERPL-SCNC: 98 MMOL/L (ref 98–112)
CHLORIDE SERPL-SCNC: 99 MMOL/L (ref 98–112)
CHOLEST SMN-MCNC: 131 MG/DL (ref ?–200)
CHOLEST SMN-MCNC: 166 MG/DL (ref ?–200)
CHOLEST SMN-MCNC: 86 MG/DL (ref ?–200)
CLARITY UR: CLEAR
CO2 SERPL-SCNC: 17 MMOL/L (ref 21–32)
CO2 SERPL-SCNC: 25 MMOL/L (ref 21–32)
CO2 SERPL-SCNC: 27 MMOL/L (ref 21–32)
CO2 SERPL-SCNC: 28 MMOL/L (ref 21–32)
CO2 SERPL-SCNC: 29 MMOL/L (ref 21–32)
CO2 SERPL-SCNC: 30 MMOL/L (ref 21–32)
CO2 SERPL-SCNC: 30 MMOL/L (ref 21–32)
CO2 SERPL-SCNC: 31 MMOL/L (ref 21–32)
CO2 SERPL-SCNC: 32 MMOL/L (ref 21–32)
CO2 SERPL-SCNC: 33 MMOL/L (ref 21–32)
CO2 SERPL-SCNC: 34 MMOL/L (ref 21–32)
CO2 SERPL-SCNC: 37 MMOL/L (ref 21–32)
CO2 SERPL-SCNC: 38 MMOL/L (ref 21–32)
CO2 SERPL-SCNC: 38 MMOL/L (ref 21–32)
CO2 SERPL-SCNC: 41 MMOL/L (ref 21–32)
CO2 SERPL-SCNC: 42 MMOL/L (ref 21–32)
CO2 SERPL-SCNC: 43 MMOL/L (ref 21–32)
CO2 SERPL-SCNC: 43 MMOL/L (ref 21–32)
COHGB MFR BLD: 2.4 % (ref 0–1.5)
COLOR UR: YELLOW
COLOR UR: YELLOW
CREAT BLD-MCNC: 0.48 MG/DL
CREAT BLD-MCNC: 0.51 MG/DL
CREAT BLD-MCNC: 0.52 MG/DL
CREAT BLD-MCNC: 0.59 MG/DL
CREAT BLD-MCNC: 0.61 MG/DL
CREAT BLD-MCNC: 0.62 MG/DL
CREAT BLD-MCNC: 0.64 MG/DL
CREAT BLD-MCNC: 0.7 MG/DL
CREAT BLD-MCNC: 0.73 MG/DL
CREAT BLD-MCNC: 0.76 MG/DL
CREAT BLD-MCNC: 0.77 MG/DL
CREAT BLD-MCNC: 0.78 MG/DL
CREAT BLD-MCNC: 0.82 MG/DL
CREAT BLD-MCNC: 0.83 MG/DL
CREAT BLD-MCNC: 0.84 MG/DL
CREAT BLD-MCNC: 0.85 MG/DL
CREAT BLD-MCNC: 0.86 MG/DL
CREAT BLD-MCNC: 0.86 MG/DL
CREAT BLD-MCNC: 0.87 MG/DL
CREAT BLD-MCNC: 0.89 MG/DL
CREAT BLD-MCNC: 0.9 MG/DL
CREAT BLD-MCNC: 0.9 MG/DL
CREAT BLD-MCNC: 0.95 MG/DL
CREAT BLD-MCNC: 0.96 MG/DL
CREAT BLD-MCNC: 0.98 MG/DL
CREAT BLD-MCNC: 1.03 MG/DL
CREAT BLD-MCNC: 1.05 MG/DL
CREAT BLD-MCNC: 1.06 MG/DL
CREAT BLD-MCNC: 1.08 MG/DL
CREAT BLD-MCNC: 1.08 MG/DL
CREAT BLD-MCNC: 1.1 MG/DL
CREAT BLD-MCNC: 1.17 MG/DL
CREAT BLD-MCNC: 1.18 MG/DL
CREAT BLD-MCNC: 1.31 MG/DL
CREAT BLD-MCNC: 1.33 MG/DL
CREAT BLD-MCNC: 1.34 MG/DL
CREAT BLD-MCNC: 2.35 MG/DL
DEPRECATED RDW RBC AUTO: 51.9 FL (ref 35.1–46.3)
DEPRECATED RDW RBC AUTO: 52 FL (ref 35.1–46.3)
DEPRECATED RDW RBC AUTO: 52.1 FL (ref 35.1–46.3)
DEPRECATED RDW RBC AUTO: 52.6 FL (ref 35.1–46.3)
DEPRECATED RDW RBC AUTO: 52.8 FL (ref 35.1–46.3)
DEPRECATED RDW RBC AUTO: 52.9 FL (ref 35.1–46.3)
DEPRECATED RDW RBC AUTO: 52.9 FL (ref 35.1–46.3)
DEPRECATED RDW RBC AUTO: 53.1 FL (ref 35.1–46.3)
DEPRECATED RDW RBC AUTO: 53.5 FL (ref 35.1–46.3)
DEPRECATED RDW RBC AUTO: 53.6 FL (ref 35.1–46.3)
DEPRECATED RDW RBC AUTO: 54 FL (ref 35.1–46.3)
DEPRECATED RDW RBC AUTO: 54.1 FL (ref 35.1–46.3)
DEPRECATED RDW RBC AUTO: 55 FL (ref 35.1–46.3)
DEPRECATED RDW RBC AUTO: 55.1 FL (ref 35.1–46.3)
DEPRECATED RDW RBC AUTO: 55.7 FL (ref 35.1–46.3)
DEPRECATED RDW RBC AUTO: 55.8 FL (ref 35.1–46.3)
DEPRECATED RDW RBC AUTO: 56 FL (ref 35.1–46.3)
DEPRECATED RDW RBC AUTO: 56.4 FL (ref 35.1–46.3)
DEPRECATED RDW RBC AUTO: 56.4 FL (ref 35.1–46.3)
DEPRECATED RDW RBC AUTO: 56.7 FL (ref 35.1–46.3)
DEPRECATED RDW RBC AUTO: 57 FL (ref 35.1–46.3)
DEPRECATED RDW RBC AUTO: 57.1 FL (ref 35.1–46.3)
DEPRECATED RDW RBC AUTO: 64.3 FL (ref 35.1–46.3)
DEPRECATED RDW RBC AUTO: 64.5 FL (ref 35.1–46.3)
DEPRECATED RDW RBC AUTO: 64.6 FL (ref 35.1–46.3)
DEPRECATED RDW RBC AUTO: 64.6 FL (ref 35.1–46.3)
DEPRECATED RDW RBC AUTO: 64.8 FL (ref 35.1–46.3)
DEPRECATED RDW RBC AUTO: 65.1 FL (ref 35.1–46.3)
DEPRECATED RDW RBC AUTO: 65.7 FL (ref 35.1–46.3)
DEPRECATED RDW RBC AUTO: 66.4 FL (ref 35.1–46.3)
DEPRECATED RDW RBC AUTO: 68 FL (ref 35.1–46.3)
DEPRECATED RDW RBC AUTO: 69.5 FL (ref 35.1–46.3)
DEPRECATED RDW RBC AUTO: 70.2 FL (ref 35.1–46.3)
EOSINOPHIL # BLD AUTO: 0 X10(3) UL (ref 0–0.7)
EOSINOPHIL # BLD AUTO: 0 X10(3) UL (ref 0–0.7)
EOSINOPHIL # BLD AUTO: 0.01 X10(3) UL (ref 0–0.7)
EOSINOPHIL # BLD AUTO: 0.02 X10(3) UL (ref 0–0.7)
EOSINOPHIL # BLD AUTO: 0.02 X10(3) UL (ref 0–0.7)
EOSINOPHIL # BLD AUTO: 0.04 X10(3) UL (ref 0–0.7)
EOSINOPHIL # BLD AUTO: 0.04 X10(3) UL (ref 0–0.7)
EOSINOPHIL # BLD AUTO: 0.05 X10(3) UL (ref 0–0.7)
EOSINOPHIL # BLD AUTO: 0.06 X10(3) UL (ref 0–0.7)
EOSINOPHIL # BLD AUTO: 0.07 X10(3) UL (ref 0–0.7)
EOSINOPHIL # BLD AUTO: 0.08 X10(3) UL (ref 0–0.7)
EOSINOPHIL # BLD AUTO: 0.1 X10(3) UL (ref 0–0.7)
EOSINOPHIL # BLD AUTO: 0.11 X10(3) UL (ref 0–0.7)
EOSINOPHIL # BLD AUTO: 0.15 X10(3) UL (ref 0–0.7)
EOSINOPHIL # BLD AUTO: 0.15 X10(3) UL (ref 0–0.7)
EOSINOPHIL # BLD AUTO: 0.18 X10(3) UL (ref 0–0.7)
EOSINOPHIL # BLD AUTO: 0.19 X10(3) UL (ref 0–0.7)
EOSINOPHIL # BLD AUTO: 0.21 X10(3) UL (ref 0–0.7)
EOSINOPHIL # BLD AUTO: 0.22 X10(3) UL (ref 0–0.7)
EOSINOPHIL # BLD AUTO: 0.29 X10(3) UL (ref 0–0.7)
EOSINOPHIL # BLD AUTO: 0.29 X10(3) UL (ref 0–0.7)
EOSINOPHIL # BLD AUTO: 0.3 X10(3) UL (ref 0–0.7)
EOSINOPHIL # BLD AUTO: 0.31 X10(3) UL (ref 0–0.7)
EOSINOPHIL # BLD AUTO: 0.33 X10(3) UL (ref 0–0.7)
EOSINOPHIL # BLD AUTO: 0.38 X10(3) UL (ref 0–0.7)
EOSINOPHIL # BLD AUTO: 0.42 X10(3) UL (ref 0–0.7)
EOSINOPHIL NFR BLD AUTO: 0 %
EOSINOPHIL NFR BLD AUTO: 0 %
EOSINOPHIL NFR BLD AUTO: 0.2 %
EOSINOPHIL NFR BLD AUTO: 0.3 %
EOSINOPHIL NFR BLD AUTO: 0.3 %
EOSINOPHIL NFR BLD AUTO: 0.5 %
EOSINOPHIL NFR BLD AUTO: 0.7 %
EOSINOPHIL NFR BLD AUTO: 0.9 %
EOSINOPHIL NFR BLD AUTO: 1 %
EOSINOPHIL NFR BLD AUTO: 1.2 %
EOSINOPHIL NFR BLD AUTO: 1.3 %
EOSINOPHIL NFR BLD AUTO: 1.8 %
EOSINOPHIL NFR BLD AUTO: 2 %
EOSINOPHIL NFR BLD AUTO: 2.5 %
EOSINOPHIL NFR BLD AUTO: 2.7 %
EOSINOPHIL NFR BLD AUTO: 3.5 %
EOSINOPHIL NFR BLD AUTO: 3.6 %
EOSINOPHIL NFR BLD AUTO: 3.7 %
EOSINOPHIL NFR BLD AUTO: 3.9 %
EOSINOPHIL NFR BLD AUTO: 4.3 %
EOSINOPHIL NFR BLD AUTO: 5 %
EOSINOPHIL NFR BLD AUTO: 5.2 %
EOSINOPHIL NFR BLD AUTO: 5.6 %
EOSINOPHIL NFR BLD AUTO: 5.8 %
EOSINOPHIL NFR BLD AUTO: 6 %
EOSINOPHIL NFR BLD AUTO: 6.1 %
ERYTHROCYTE [DISTWIDTH] IN BLOOD BY AUTOMATED COUNT: 14.6 % (ref 11–15)
ERYTHROCYTE [DISTWIDTH] IN BLOOD BY AUTOMATED COUNT: 14.8 % (ref 11–15)
ERYTHROCYTE [DISTWIDTH] IN BLOOD BY AUTOMATED COUNT: 14.9 % (ref 11–15)
ERYTHROCYTE [DISTWIDTH] IN BLOOD BY AUTOMATED COUNT: 14.9 % (ref 11–15)
ERYTHROCYTE [DISTWIDTH] IN BLOOD BY AUTOMATED COUNT: 15 % (ref 11–15)
ERYTHROCYTE [DISTWIDTH] IN BLOOD BY AUTOMATED COUNT: 15.2 % (ref 11–15)
ERYTHROCYTE [DISTWIDTH] IN BLOOD BY AUTOMATED COUNT: 15.2 % (ref 11–15)
ERYTHROCYTE [DISTWIDTH] IN BLOOD BY AUTOMATED COUNT: 15.3 % (ref 11–15)
ERYTHROCYTE [DISTWIDTH] IN BLOOD BY AUTOMATED COUNT: 15.4 % (ref 11–15)
ERYTHROCYTE [DISTWIDTH] IN BLOOD BY AUTOMATED COUNT: 15.5 % (ref 11–15)
ERYTHROCYTE [DISTWIDTH] IN BLOOD BY AUTOMATED COUNT: 15.7 % (ref 11–15)
ERYTHROCYTE [DISTWIDTH] IN BLOOD BY AUTOMATED COUNT: 15.7 % (ref 11–15)
ERYTHROCYTE [DISTWIDTH] IN BLOOD BY AUTOMATED COUNT: 15.8 % (ref 11–15)
ERYTHROCYTE [DISTWIDTH] IN BLOOD BY AUTOMATED COUNT: 15.8 % (ref 11–15)
ERYTHROCYTE [DISTWIDTH] IN BLOOD BY AUTOMATED COUNT: 15.9 % (ref 11–15)
ERYTHROCYTE [DISTWIDTH] IN BLOOD BY AUTOMATED COUNT: 16.8 % (ref 11–15)
ERYTHROCYTE [DISTWIDTH] IN BLOOD BY AUTOMATED COUNT: 17.2 % (ref 11–15)
ERYTHROCYTE [DISTWIDTH] IN BLOOD BY AUTOMATED COUNT: 17.4 % (ref 11–15)
ERYTHROCYTE [DISTWIDTH] IN BLOOD BY AUTOMATED COUNT: 17.7 % (ref 11–15)
ERYTHROCYTE [DISTWIDTH] IN BLOOD BY AUTOMATED COUNT: 18 % (ref 11–15)
ERYTHROCYTE [DISTWIDTH] IN BLOOD BY AUTOMATED COUNT: 20.6 % (ref 11–15)
ERYTHROCYTE [DISTWIDTH] IN BLOOD BY AUTOMATED COUNT: 20.6 % (ref 11–15)
ERYTHROCYTE [DISTWIDTH] IN BLOOD BY AUTOMATED COUNT: 20.7 % (ref 11–15)
ERYTHROCYTE [DISTWIDTH] IN BLOOD BY AUTOMATED COUNT: 20.7 % (ref 11–15)
ERYTHROCYTE [DISTWIDTH] IN BLOOD BY AUTOMATED COUNT: 20.8 % (ref 11–15)
ERYTHROCYTE [DISTWIDTH] IN BLOOD BY AUTOMATED COUNT: 20.9 % (ref 11–15)
ERYTHROCYTE [DISTWIDTH] IN BLOOD BY AUTOMATED COUNT: 20.9 % (ref 11–15)
ERYTHROCYTE [DISTWIDTH] IN BLOOD BY AUTOMATED COUNT: 21 % (ref 11–15)
ERYTHROCYTE [DISTWIDTH] IN BLOOD BY AUTOMATED COUNT: 21.1 % (ref 11–15)
ERYTHROCYTE [DISTWIDTH] IN BLOOD BY AUTOMATED COUNT: 21.4 % (ref 11–15)
ERYTHROCYTE [DISTWIDTH] IN BLOOD BY AUTOMATED COUNT: 21.8 % (ref 11–15)
EST. AVERAGE GLUCOSE BLD GHB EST-MCNC: 126 MG/DL (ref 68–126)
EST. AVERAGE GLUCOSE BLD GHB EST-MCNC: 140 MG/DL (ref 68–126)
GLOBULIN PLAS-MCNC: 2.5 G/DL (ref 2.8–4.4)
GLOBULIN PLAS-MCNC: 2.8 G/DL (ref 2.8–4.4)
GLOBULIN PLAS-MCNC: 3 G/DL (ref 2.8–4.4)
GLUCOSE BLD-MCNC: 100 MG/DL (ref 70–99)
GLUCOSE BLD-MCNC: 101 MG/DL (ref 70–99)
GLUCOSE BLD-MCNC: 103 MG/DL (ref 70–99)
GLUCOSE BLD-MCNC: 103 MG/DL (ref 70–99)
GLUCOSE BLD-MCNC: 104 MG/DL (ref 70–99)
GLUCOSE BLD-MCNC: 105 MG/DL (ref 70–99)
GLUCOSE BLD-MCNC: 106 MG/DL (ref 70–99)
GLUCOSE BLD-MCNC: 110 MG/DL (ref 70–99)
GLUCOSE BLD-MCNC: 116 MG/DL (ref 70–99)
GLUCOSE BLD-MCNC: 119 MG/DL (ref 70–99)
GLUCOSE BLD-MCNC: 128 MG/DL (ref 70–99)
GLUCOSE BLD-MCNC: 128 MG/DL (ref 70–99)
GLUCOSE BLD-MCNC: 133 MG/DL (ref 70–99)
GLUCOSE BLD-MCNC: 134 MG/DL (ref 70–99)
GLUCOSE BLD-MCNC: 139 MG/DL (ref 70–99)
GLUCOSE BLD-MCNC: 140 MG/DL (ref 70–99)
GLUCOSE BLD-MCNC: 141 MG/DL (ref 70–99)
GLUCOSE BLD-MCNC: 143 MG/DL (ref 70–99)
GLUCOSE BLD-MCNC: 149 MG/DL (ref 70–99)
GLUCOSE BLD-MCNC: 154 MG/DL (ref 70–99)
GLUCOSE BLD-MCNC: 167 MG/DL (ref 70–99)
GLUCOSE BLD-MCNC: 170 MG/DL (ref 70–99)
GLUCOSE BLD-MCNC: 254 MG/DL (ref 70–99)
GLUCOSE BLD-MCNC: 73 MG/DL (ref 70–99)
GLUCOSE BLD-MCNC: 83 MG/DL (ref 70–99)
GLUCOSE BLD-MCNC: 83 MG/DL (ref 70–99)
GLUCOSE BLD-MCNC: 88 MG/DL (ref 70–99)
GLUCOSE BLD-MCNC: 89 MG/DL (ref 70–99)
GLUCOSE BLD-MCNC: 91 MG/DL (ref 70–99)
GLUCOSE BLD-MCNC: 91 MG/DL (ref 70–99)
GLUCOSE BLD-MCNC: 92 MG/DL (ref 70–99)
GLUCOSE BLD-MCNC: 93 MG/DL (ref 70–99)
GLUCOSE BLD-MCNC: 97 MG/DL (ref 70–99)
GLUCOSE BLD-MCNC: 97 MG/DL (ref 70–99)
GLUCOSE BLD-MCNC: 98 MG/DL (ref 70–99)
GLUCOSE BLDC GLUCOMTR-MCNC: 100 MG/DL (ref 70–99)
GLUCOSE BLDC GLUCOMTR-MCNC: 100 MG/DL (ref 70–99)
GLUCOSE BLDC GLUCOMTR-MCNC: 102 MG/DL (ref 70–99)
GLUCOSE BLDC GLUCOMTR-MCNC: 102 MG/DL (ref 70–99)
GLUCOSE BLDC GLUCOMTR-MCNC: 103 MG/DL (ref 70–99)
GLUCOSE BLDC GLUCOMTR-MCNC: 103 MG/DL (ref 70–99)
GLUCOSE BLDC GLUCOMTR-MCNC: 104 MG/DL (ref 70–99)
GLUCOSE BLDC GLUCOMTR-MCNC: 105 MG/DL (ref 70–99)
GLUCOSE BLDC GLUCOMTR-MCNC: 105 MG/DL (ref 70–99)
GLUCOSE BLDC GLUCOMTR-MCNC: 106 MG/DL (ref 70–99)
GLUCOSE BLDC GLUCOMTR-MCNC: 106 MG/DL (ref 70–99)
GLUCOSE BLDC GLUCOMTR-MCNC: 107 MG/DL (ref 70–99)
GLUCOSE BLDC GLUCOMTR-MCNC: 108 MG/DL (ref 70–99)
GLUCOSE BLDC GLUCOMTR-MCNC: 108 MG/DL (ref 70–99)
GLUCOSE BLDC GLUCOMTR-MCNC: 109 MG/DL (ref 70–99)
GLUCOSE BLDC GLUCOMTR-MCNC: 110 MG/DL (ref 70–99)
GLUCOSE BLDC GLUCOMTR-MCNC: 111 MG/DL (ref 70–99)
GLUCOSE BLDC GLUCOMTR-MCNC: 111 MG/DL (ref 70–99)
GLUCOSE BLDC GLUCOMTR-MCNC: 112 MG/DL (ref 70–99)
GLUCOSE BLDC GLUCOMTR-MCNC: 113 MG/DL (ref 70–99)
GLUCOSE BLDC GLUCOMTR-MCNC: 115 MG/DL (ref 70–99)
GLUCOSE BLDC GLUCOMTR-MCNC: 116 MG/DL (ref 70–99)
GLUCOSE BLDC GLUCOMTR-MCNC: 118 MG/DL (ref 70–99)
GLUCOSE BLDC GLUCOMTR-MCNC: 119 MG/DL (ref 70–99)
GLUCOSE BLDC GLUCOMTR-MCNC: 120 MG/DL (ref 70–99)
GLUCOSE BLDC GLUCOMTR-MCNC: 123 MG/DL (ref 70–99)
GLUCOSE BLDC GLUCOMTR-MCNC: 124 MG/DL (ref 70–99)
GLUCOSE BLDC GLUCOMTR-MCNC: 124 MG/DL (ref 70–99)
GLUCOSE BLDC GLUCOMTR-MCNC: 125 MG/DL (ref 70–99)
GLUCOSE BLDC GLUCOMTR-MCNC: 125 MG/DL (ref 70–99)
GLUCOSE BLDC GLUCOMTR-MCNC: 127 MG/DL (ref 70–99)
GLUCOSE BLDC GLUCOMTR-MCNC: 129 MG/DL (ref 70–99)
GLUCOSE BLDC GLUCOMTR-MCNC: 130 MG/DL (ref 70–99)
GLUCOSE BLDC GLUCOMTR-MCNC: 130 MG/DL (ref 70–99)
GLUCOSE BLDC GLUCOMTR-MCNC: 131 MG/DL (ref 70–99)
GLUCOSE BLDC GLUCOMTR-MCNC: 133 MG/DL (ref 70–99)
GLUCOSE BLDC GLUCOMTR-MCNC: 134 MG/DL (ref 70–99)
GLUCOSE BLDC GLUCOMTR-MCNC: 135 MG/DL (ref 70–99)
GLUCOSE BLDC GLUCOMTR-MCNC: 136 MG/DL (ref 70–99)
GLUCOSE BLDC GLUCOMTR-MCNC: 138 MG/DL (ref 70–99)
GLUCOSE BLDC GLUCOMTR-MCNC: 138 MG/DL (ref 70–99)
GLUCOSE BLDC GLUCOMTR-MCNC: 140 MG/DL (ref 70–99)
GLUCOSE BLDC GLUCOMTR-MCNC: 141 MG/DL (ref 70–99)
GLUCOSE BLDC GLUCOMTR-MCNC: 143 MG/DL (ref 70–99)
GLUCOSE BLDC GLUCOMTR-MCNC: 145 MG/DL (ref 70–99)
GLUCOSE BLDC GLUCOMTR-MCNC: 146 MG/DL (ref 70–99)
GLUCOSE BLDC GLUCOMTR-MCNC: 148 MG/DL (ref 70–99)
GLUCOSE BLDC GLUCOMTR-MCNC: 149 MG/DL (ref 70–99)
GLUCOSE BLDC GLUCOMTR-MCNC: 149 MG/DL (ref 70–99)
GLUCOSE BLDC GLUCOMTR-MCNC: 150 MG/DL (ref 70–99)
GLUCOSE BLDC GLUCOMTR-MCNC: 151 MG/DL (ref 70–99)
GLUCOSE BLDC GLUCOMTR-MCNC: 152 MG/DL (ref 70–99)
GLUCOSE BLDC GLUCOMTR-MCNC: 153 MG/DL (ref 70–99)
GLUCOSE BLDC GLUCOMTR-MCNC: 154 MG/DL (ref 70–99)
GLUCOSE BLDC GLUCOMTR-MCNC: 155 MG/DL (ref 70–99)
GLUCOSE BLDC GLUCOMTR-MCNC: 156 MG/DL (ref 70–99)
GLUCOSE BLDC GLUCOMTR-MCNC: 158 MG/DL (ref 70–99)
GLUCOSE BLDC GLUCOMTR-MCNC: 159 MG/DL (ref 70–99)
GLUCOSE BLDC GLUCOMTR-MCNC: 160 MG/DL (ref 70–99)
GLUCOSE BLDC GLUCOMTR-MCNC: 160 MG/DL (ref 70–99)
GLUCOSE BLDC GLUCOMTR-MCNC: 161 MG/DL (ref 70–99)
GLUCOSE BLDC GLUCOMTR-MCNC: 161 MG/DL (ref 70–99)
GLUCOSE BLDC GLUCOMTR-MCNC: 162 MG/DL (ref 70–99)
GLUCOSE BLDC GLUCOMTR-MCNC: 165 MG/DL (ref 70–99)
GLUCOSE BLDC GLUCOMTR-MCNC: 169 MG/DL (ref 70–99)
GLUCOSE BLDC GLUCOMTR-MCNC: 172 MG/DL (ref 70–99)
GLUCOSE BLDC GLUCOMTR-MCNC: 172 MG/DL (ref 70–99)
GLUCOSE BLDC GLUCOMTR-MCNC: 177 MG/DL (ref 70–99)
GLUCOSE BLDC GLUCOMTR-MCNC: 177 MG/DL (ref 70–99)
GLUCOSE BLDC GLUCOMTR-MCNC: 185 MG/DL (ref 70–99)
GLUCOSE BLDC GLUCOMTR-MCNC: 200 MG/DL (ref 70–99)
GLUCOSE BLDC GLUCOMTR-MCNC: 220 MG/DL (ref 70–99)
GLUCOSE BLDC GLUCOMTR-MCNC: 238 MG/DL (ref 70–99)
GLUCOSE BLDC GLUCOMTR-MCNC: 81 MG/DL (ref 70–99)
GLUCOSE BLDC GLUCOMTR-MCNC: 86 MG/DL (ref 70–99)
GLUCOSE BLDC GLUCOMTR-MCNC: 91 MG/DL (ref 70–99)
GLUCOSE BLDC GLUCOMTR-MCNC: 96 MG/DL (ref 70–99)
GLUCOSE BLDC GLUCOMTR-MCNC: 99 MG/DL (ref 70–99)
GLUCOSE UR-MCNC: 150 MG/DL
GLUCOSE UR-MCNC: NEGATIVE MG/DL
HAV IGM SER QL: 1.8 MG/DL (ref 1.6–2.6)
HAV IGM SER QL: 1.9 MG/DL (ref 1.6–2.6)
HAV IGM SER QL: 2 MG/DL (ref 1.6–2.6)
HAV IGM SER QL: 2.1 MG/DL (ref 1.6–2.6)
HAV IGM SER QL: 2.1 MG/DL (ref 1.6–2.6)
HAV IGM SER QL: 2.2 MG/DL (ref 1.6–2.6)
HAV IGM SER QL: 2.2 MG/DL (ref 1.6–2.6)
HAV IGM SER QL: 2.3 MG/DL (ref 1.6–2.6)
HAV IGM SER QL: 2.4 MG/DL (ref 1.6–2.6)
HAV IGM SER QL: 2.6 MG/DL (ref 1.6–2.6)
HBA1C MFR BLD HPLC: 6 % (ref ?–5.7)
HBA1C MFR BLD HPLC: 6.5 % (ref ?–5.7)
HCO3 BLDA-SCNC: 17.8 MEQ/L (ref 21–27)
HCO3 BLDA-SCNC: 23.9 MEQ/L (ref 21–27)
HCO3 BLDA-SCNC: 27.1 MEQ/L (ref 21–27)
HCO3 BLDA-SCNC: 43.2 MEQ/L (ref 21–27)
HCT VFR BLD AUTO: 24.2 %
HCT VFR BLD AUTO: 26.4 %
HCT VFR BLD AUTO: 26.7 %
HCT VFR BLD AUTO: 27 %
HCT VFR BLD AUTO: 27.3 %
HCT VFR BLD AUTO: 27.4 %
HCT VFR BLD AUTO: 27.5 %
HCT VFR BLD AUTO: 28.2 %
HCT VFR BLD AUTO: 28.2 %
HCT VFR BLD AUTO: 28.4 %
HCT VFR BLD AUTO: 28.8 %
HCT VFR BLD AUTO: 29 %
HCT VFR BLD AUTO: 29.1 %
HCT VFR BLD AUTO: 29.2 %
HCT VFR BLD AUTO: 29.4 %
HCT VFR BLD AUTO: 29.6 %
HCT VFR BLD AUTO: 29.8 %
HCT VFR BLD AUTO: 30.3 %
HCT VFR BLD AUTO: 30.6 %
HCT VFR BLD AUTO: 30.7 %
HCT VFR BLD AUTO: 30.9 %
HCT VFR BLD AUTO: 31.1 %
HCT VFR BLD AUTO: 31.3 %
HCT VFR BLD AUTO: 31.7 %
HCT VFR BLD AUTO: 32.1 %
HCT VFR BLD AUTO: 32.3 %
HCT VFR BLD AUTO: 33 %
HCT VFR BLD AUTO: 33.2 %
HCT VFR BLD AUTO: 33.5 %
HCT VFR BLD AUTO: 36.3 %
HCT VFR BLD AUTO: 40 %
HCT VFR BLD AUTO: 41.2 %
HCT VFR BLD AUTO: 43.3 %
HDLC SERPL-MCNC: 33 MG/DL (ref 40–59)
HDLC SERPL-MCNC: 41 MG/DL (ref 40–59)
HDLC SERPL-MCNC: 56 MG/DL (ref 40–59)
HEMOCCULT STL QL: POSITIVE
HGB BLD-MCNC: 10.1 G/DL
HGB BLD-MCNC: 10.2 G/DL
HGB BLD-MCNC: 10.3 G/DL
HGB BLD-MCNC: 10.5 G/DL
HGB BLD-MCNC: 10.6 G/DL
HGB BLD-MCNC: 10.6 G/DL
HGB BLD-MCNC: 10.7 G/DL (ref 13.5–17.5)
HGB BLD-MCNC: 10.8 G/DL
HGB BLD-MCNC: 10.8 G/DL
HGB BLD-MCNC: 11.8 G/DL
HGB BLD-MCNC: 13.1 G/DL
HGB BLD-MCNC: 13.1 G/DL
HGB BLD-MCNC: 13.8 G/DL
HGB BLD-MCNC: 7.2 G/DL
HGB BLD-MCNC: 7.8 G/DL
HGB BLD-MCNC: 7.9 G/DL
HGB BLD-MCNC: 8.1 G/DL
HGB BLD-MCNC: 8.1 G/DL
HGB BLD-MCNC: 8.2 G/DL
HGB BLD-MCNC: 8.4 G/DL
HGB BLD-MCNC: 8.5 G/DL
HGB BLD-MCNC: 8.6 G/DL
HGB BLD-MCNC: 8.7 G/DL
HGB BLD-MCNC: 9 G/DL
HGB BLD-MCNC: 9.2 G/DL
HGB BLD-MCNC: 9.3 G/DL
HGB BLD-MCNC: 9.3 G/DL
HGB BLD-MCNC: 9.7 G/DL
HGB BLD-MCNC: 9.7 G/DL
HGB BLD-MCNC: 9.9 G/DL
HGB UR QL STRIP.AUTO: NEGATIVE
HYALINE CASTS #/AREA URNS AUTO: PRESENT /LPF
IMM GRANULOCYTES # BLD AUTO: 0.01 X10(3) UL (ref 0–1)
IMM GRANULOCYTES # BLD AUTO: 0.02 X10(3) UL (ref 0–1)
IMM GRANULOCYTES # BLD AUTO: 0.03 X10(3) UL (ref 0–1)
IMM GRANULOCYTES # BLD AUTO: 0.04 X10(3) UL (ref 0–1)
IMM GRANULOCYTES # BLD AUTO: 0.05 X10(3) UL (ref 0–1)
IMM GRANULOCYTES # BLD AUTO: 0.06 X10(3) UL (ref 0–1)
IMM GRANULOCYTES # BLD AUTO: 0.07 X10(3) UL (ref 0–1)
IMM GRANULOCYTES # BLD AUTO: 0.1 X10(3) UL (ref 0–1)
IMM GRANULOCYTES # BLD AUTO: 0.1 X10(3) UL (ref 0–1)
IMM GRANULOCYTES # BLD AUTO: 0.11 X10(3) UL (ref 0–1)
IMM GRANULOCYTES # BLD AUTO: 0.22 X10(3) UL (ref 0–1)
IMM GRANULOCYTES NFR BLD: 0.2 %
IMM GRANULOCYTES NFR BLD: 0.3 %
IMM GRANULOCYTES NFR BLD: 0.4 %
IMM GRANULOCYTES NFR BLD: 0.5 %
IMM GRANULOCYTES NFR BLD: 0.6 %
IMM GRANULOCYTES NFR BLD: 0.6 %
IMM GRANULOCYTES NFR BLD: 0.7 %
IMM GRANULOCYTES NFR BLD: 0.7 %
IMM GRANULOCYTES NFR BLD: 0.8 %
IMM GRANULOCYTES NFR BLD: 0.8 %
IMM GRANULOCYTES NFR BLD: 1 %
IMM GRANULOCYTES NFR BLD: 1.5 %
IMM GRANULOCYTES NFR BLD: 1.8 %
IMM GRANULOCYTES NFR BLD: 1.8 %
IMM GRANULOCYTES NFR BLD: 3.7 %
INR BLD: 1.16 (ref 0.9–1.2)
INR BLD: 1.18 (ref 0.9–1.2)
INR BLD: 1.3 (ref 0.9–1.2)
IRON SATURATION: 46 %
IRON SATURATION: 9 %
IRON SERPL-MCNC: 119 UG/DL
IRON SERPL-MCNC: 24 UG/DL
KETONES UR-MCNC: NEGATIVE MG/DL
LACTATE SERPL-SCNC: 1.8 MMOL/L (ref 0.4–2)
LACTATE SERPL-SCNC: 3.9 MMOL/L (ref 0.4–2)
LACTATE SERPL-SCNC: 7.7 MMOL/L (ref 0.4–2)
LACTIC ACID (BLOOD GAS): 0.8 MMOL/L (ref 0.5–2.2)
LDLC SERPL CALC-MCNC: 38 MG/DL (ref ?–100)
LDLC SERPL CALC-MCNC: 57 MG/DL (ref ?–100)
LDLC SERPL CALC-MCNC: 94 MG/DL (ref ?–100)
LEUKOCYTE ESTERASE UR QL STRIP.AUTO: NEGATIVE
LYMPHOCYTES # BLD AUTO: 0.24 X10(3) UL (ref 1–4)
LYMPHOCYTES # BLD AUTO: 0.33 X10(3) UL (ref 1–4)
LYMPHOCYTES # BLD AUTO: 0.35 X10(3) UL (ref 1–4)
LYMPHOCYTES # BLD AUTO: 0.38 X10(3) UL (ref 1–4)
LYMPHOCYTES # BLD AUTO: 0.4 X10(3) UL (ref 1–4)
LYMPHOCYTES # BLD AUTO: 0.41 X10(3) UL (ref 1–4)
LYMPHOCYTES # BLD AUTO: 0.41 X10(3) UL (ref 1–4)
LYMPHOCYTES # BLD AUTO: 0.42 X10(3) UL (ref 1–4)
LYMPHOCYTES # BLD AUTO: 0.43 X10(3) UL (ref 1–4)
LYMPHOCYTES # BLD AUTO: 0.45 X10(3) UL (ref 1–4)
LYMPHOCYTES # BLD AUTO: 0.45 X10(3) UL (ref 1–4)
LYMPHOCYTES # BLD AUTO: 0.48 X10(3) UL (ref 1–4)
LYMPHOCYTES # BLD AUTO: 0.49 X10(3) UL (ref 1–4)
LYMPHOCYTES # BLD AUTO: 0.5 X10(3) UL (ref 1–4)
LYMPHOCYTES # BLD AUTO: 0.54 X10(3) UL (ref 1–4)
LYMPHOCYTES # BLD AUTO: 0.55 X10(3) UL (ref 1–4)
LYMPHOCYTES # BLD AUTO: 0.56 X10(3) UL (ref 1–4)
LYMPHOCYTES # BLD AUTO: 0.56 X10(3) UL (ref 1–4)
LYMPHOCYTES # BLD AUTO: 0.58 X10(3) UL (ref 1–4)
LYMPHOCYTES # BLD AUTO: 0.6 X10(3) UL (ref 1–4)
LYMPHOCYTES # BLD AUTO: 0.72 X10(3) UL (ref 1–4)
LYMPHOCYTES # BLD AUTO: 1.73 X10(3) UL (ref 1–4)
LYMPHOCYTES NFR BLD AUTO: 10.4 %
LYMPHOCYTES NFR BLD AUTO: 11.7 %
LYMPHOCYTES NFR BLD AUTO: 12.8 %
LYMPHOCYTES NFR BLD AUTO: 30.4 %
LYMPHOCYTES NFR BLD AUTO: 4.4 %
LYMPHOCYTES NFR BLD AUTO: 4.4 %
LYMPHOCYTES NFR BLD AUTO: 4.6 %
LYMPHOCYTES NFR BLD AUTO: 4.8 %
LYMPHOCYTES NFR BLD AUTO: 5.2 %
LYMPHOCYTES NFR BLD AUTO: 5.8 %
LYMPHOCYTES NFR BLD AUTO: 6.3 %
LYMPHOCYTES NFR BLD AUTO: 6.5 %
LYMPHOCYTES NFR BLD AUTO: 6.6 %
LYMPHOCYTES NFR BLD AUTO: 6.8 %
LYMPHOCYTES NFR BLD AUTO: 7 %
LYMPHOCYTES NFR BLD AUTO: 7.2 %
LYMPHOCYTES NFR BLD AUTO: 7.3 %
LYMPHOCYTES NFR BLD AUTO: 7.6 %
LYMPHOCYTES NFR BLD AUTO: 7.9 %
LYMPHOCYTES NFR BLD AUTO: 8 %
LYMPHOCYTES NFR BLD AUTO: 8.4 %
LYMPHOCYTES NFR BLD AUTO: 8.5 %
LYMPHOCYTES NFR BLD AUTO: 8.7 %
LYMPHOCYTES NFR BLD AUTO: 9.1 %
LYMPHOCYTES NFR BLD AUTO: 9.5 %
LYMPHOCYTES NFR BLD AUTO: 9.5 %
M PROTEIN MFR SERPL ELPH: 5.1 G/DL (ref 6.4–8.2)
M PROTEIN MFR SERPL ELPH: 5.5 G/DL (ref 6.4–8.2)
M PROTEIN MFR SERPL ELPH: 5.7 G/DL (ref 6.4–8.2)
MCH RBC QN AUTO: 25.8 PG (ref 26–34)
MCH RBC QN AUTO: 25.8 PG (ref 26–34)
MCH RBC QN AUTO: 25.9 PG (ref 26–34)
MCH RBC QN AUTO: 26.1 PG (ref 26–34)
MCH RBC QN AUTO: 26.2 PG (ref 26–34)
MCH RBC QN AUTO: 26.3 PG (ref 26–34)
MCH RBC QN AUTO: 26.3 PG (ref 26–34)
MCH RBC QN AUTO: 26.5 PG (ref 26–34)
MCH RBC QN AUTO: 26.6 PG (ref 26–34)
MCH RBC QN AUTO: 26.7 PG (ref 26–34)
MCH RBC QN AUTO: 26.8 PG (ref 26–34)
MCH RBC QN AUTO: 30.7 PG (ref 26–34)
MCH RBC QN AUTO: 30.8 PG (ref 26–34)
MCH RBC QN AUTO: 30.8 PG (ref 26–34)
MCH RBC QN AUTO: 30.9 PG (ref 26–34)
MCH RBC QN AUTO: 31 PG (ref 26–34)
MCH RBC QN AUTO: 31.2 PG (ref 26–34)
MCH RBC QN AUTO: 31.3 PG (ref 26–34)
MCH RBC QN AUTO: 31.4 PG (ref 26–34)
MCH RBC QN AUTO: 31.5 PG (ref 26–34)
MCH RBC QN AUTO: 32.1 PG (ref 26–34)
MCHC RBC AUTO-ENTMCNC: 29.2 G/DL (ref 31–37)
MCHC RBC AUTO-ENTMCNC: 29.3 G/DL (ref 31–37)
MCHC RBC AUTO-ENTMCNC: 29.5 G/DL (ref 31–37)
MCHC RBC AUTO-ENTMCNC: 29.6 G/DL (ref 31–37)
MCHC RBC AUTO-ENTMCNC: 29.7 G/DL (ref 31–37)
MCHC RBC AUTO-ENTMCNC: 29.8 G/DL (ref 31–37)
MCHC RBC AUTO-ENTMCNC: 29.8 G/DL (ref 31–37)
MCHC RBC AUTO-ENTMCNC: 29.9 G/DL (ref 31–37)
MCHC RBC AUTO-ENTMCNC: 29.9 G/DL (ref 31–37)
MCHC RBC AUTO-ENTMCNC: 30 G/DL (ref 31–37)
MCHC RBC AUTO-ENTMCNC: 30.1 G/DL (ref 31–37)
MCHC RBC AUTO-ENTMCNC: 30.3 G/DL (ref 31–37)
MCHC RBC AUTO-ENTMCNC: 30.4 G/DL (ref 31–37)
MCHC RBC AUTO-ENTMCNC: 30.5 G/DL (ref 31–37)
MCHC RBC AUTO-ENTMCNC: 30.6 G/DL (ref 31–37)
MCHC RBC AUTO-ENTMCNC: 30.6 G/DL (ref 31–37)
MCHC RBC AUTO-ENTMCNC: 30.7 G/DL (ref 31–37)
MCHC RBC AUTO-ENTMCNC: 30.9 G/DL (ref 31–37)
MCHC RBC AUTO-ENTMCNC: 31.8 G/DL (ref 31–37)
MCHC RBC AUTO-ENTMCNC: 31.8 G/DL (ref 31–37)
MCHC RBC AUTO-ENTMCNC: 31.9 G/DL (ref 31–37)
MCHC RBC AUTO-ENTMCNC: 32.1 G/DL (ref 31–37)
MCHC RBC AUTO-ENTMCNC: 32.2 G/DL (ref 31–37)
MCHC RBC AUTO-ENTMCNC: 32.4 G/DL (ref 31–37)
MCHC RBC AUTO-ENTMCNC: 32.5 G/DL (ref 31–37)
MCHC RBC AUTO-ENTMCNC: 32.5 G/DL (ref 31–37)
MCHC RBC AUTO-ENTMCNC: 32.6 G/DL (ref 31–37)
MCHC RBC AUTO-ENTMCNC: 32.7 G/DL (ref 31–37)
MCHC RBC AUTO-ENTMCNC: 32.8 G/DL (ref 31–37)
MCHC RBC AUTO-ENTMCNC: 32.9 G/DL (ref 31–37)
MCHC RBC AUTO-ENTMCNC: 33.1 G/DL (ref 31–37)
MCV RBC AUTO: 84.9 FL
MCV RBC AUTO: 85.2 FL
MCV RBC AUTO: 85.7 FL
MCV RBC AUTO: 86 FL
MCV RBC AUTO: 86.6 FL
MCV RBC AUTO: 86.9 FL
MCV RBC AUTO: 87 FL
MCV RBC AUTO: 87.3 FL
MCV RBC AUTO: 87.4 FL
MCV RBC AUTO: 87.5 FL
MCV RBC AUTO: 87.7 FL
MCV RBC AUTO: 87.9 FL
MCV RBC AUTO: 88 FL
MCV RBC AUTO: 88.7 FL
MCV RBC AUTO: 88.9 FL
MCV RBC AUTO: 89.8 FL
MCV RBC AUTO: 91.1 FL
MCV RBC AUTO: 91.5 FL
MCV RBC AUTO: 93.1 FL
MCV RBC AUTO: 95.2 FL
MCV RBC AUTO: 95.7 FL
MCV RBC AUTO: 95.8 FL
MCV RBC AUTO: 95.8 FL
MCV RBC AUTO: 96 FL
MCV RBC AUTO: 96.2 FL
MCV RBC AUTO: 96.2 FL
MCV RBC AUTO: 96.5 FL
MCV RBC AUTO: 96.5 FL
MCV RBC AUTO: 96.7 FL
MCV RBC AUTO: 96.8 FL
MCV RBC AUTO: 97.1 FL
MCV RBC AUTO: 97.3 FL
MCV RBC AUTO: 97.5 FL
METHGB MFR BLD: 0.4 % SAT (ref 0.4–1.5)
MODIFIED ALLEN TEST: POSITIVE
MODIFIED ALLEN TEST: POSITIVE
MONOCYTES # BLD AUTO: 0.29 X10(3) UL (ref 0.1–1)
MONOCYTES # BLD AUTO: 0.37 X10(3) UL (ref 0.1–1)
MONOCYTES # BLD AUTO: 0.39 X10(3) UL (ref 0.1–1)
MONOCYTES # BLD AUTO: 0.39 X10(3) UL (ref 0.1–1)
MONOCYTES # BLD AUTO: 0.44 X10(3) UL (ref 0.1–1)
MONOCYTES # BLD AUTO: 0.44 X10(3) UL (ref 0.1–1)
MONOCYTES # BLD AUTO: 0.45 X10(3) UL (ref 0.1–1)
MONOCYTES # BLD AUTO: 0.46 X10(3) UL (ref 0.1–1)
MONOCYTES # BLD AUTO: 0.51 X10(3) UL (ref 0.1–1)
MONOCYTES # BLD AUTO: 0.52 X10(3) UL (ref 0.1–1)
MONOCYTES # BLD AUTO: 0.55 X10(3) UL (ref 0.1–1)
MONOCYTES # BLD AUTO: 0.58 X10(3) UL (ref 0.1–1)
MONOCYTES # BLD AUTO: 0.63 X10(3) UL (ref 0.1–1)
MONOCYTES # BLD AUTO: 0.64 X10(3) UL (ref 0.1–1)
MONOCYTES # BLD AUTO: 0.65 X10(3) UL (ref 0.1–1)
MONOCYTES # BLD AUTO: 0.67 X10(3) UL (ref 0.1–1)
MONOCYTES # BLD AUTO: 0.69 X10(3) UL (ref 0.1–1)
MONOCYTES # BLD AUTO: 0.7 X10(3) UL (ref 0.1–1)
MONOCYTES # BLD AUTO: 0.7 X10(3) UL (ref 0.1–1)
MONOCYTES # BLD AUTO: 0.71 X10(3) UL (ref 0.1–1)
MONOCYTES # BLD AUTO: 0.71 X10(3) UL (ref 0.1–1)
MONOCYTES # BLD AUTO: 0.81 X10(3) UL (ref 0.1–1)
MONOCYTES # BLD AUTO: 0.87 X10(3) UL (ref 0.1–1)
MONOCYTES # BLD AUTO: 0.93 X10(3) UL (ref 0.1–1)
MONOCYTES NFR BLD AUTO: 10.2 %
MONOCYTES NFR BLD AUTO: 10.5 %
MONOCYTES NFR BLD AUTO: 10.6 %
MONOCYTES NFR BLD AUTO: 10.9 %
MONOCYTES NFR BLD AUTO: 11.2 %
MONOCYTES NFR BLD AUTO: 11.4 %
MONOCYTES NFR BLD AUTO: 11.6 %
MONOCYTES NFR BLD AUTO: 11.6 %
MONOCYTES NFR BLD AUTO: 12.2 %
MONOCYTES NFR BLD AUTO: 5.1 %
MONOCYTES NFR BLD AUTO: 7.2 %
MONOCYTES NFR BLD AUTO: 7.3 %
MONOCYTES NFR BLD AUTO: 7.8 %
MONOCYTES NFR BLD AUTO: 7.9 %
MONOCYTES NFR BLD AUTO: 8 %
MONOCYTES NFR BLD AUTO: 8.3 %
MONOCYTES NFR BLD AUTO: 8.3 %
MONOCYTES NFR BLD AUTO: 8.4 %
MONOCYTES NFR BLD AUTO: 8.7 %
MONOCYTES NFR BLD AUTO: 8.8 %
MONOCYTES NFR BLD AUTO: 9 %
MONOCYTES NFR BLD AUTO: 9.1 %
MONOCYTES NFR BLD AUTO: 9.4 %
MONOCYTES NFR BLD AUTO: 9.7 %
NEUTROPHILS # BLD AUTO: 3.32 X10 (3) UL (ref 1.5–7.7)
NEUTROPHILS # BLD AUTO: 3.32 X10(3) UL (ref 1.5–7.7)
NEUTROPHILS # BLD AUTO: 3.52 X10 (3) UL (ref 1.5–7.7)
NEUTROPHILS # BLD AUTO: 3.52 X10(3) UL (ref 1.5–7.7)
NEUTROPHILS # BLD AUTO: 3.77 X10 (3) UL (ref 1.5–7.7)
NEUTROPHILS # BLD AUTO: 3.77 X10(3) UL (ref 1.5–7.7)
NEUTROPHILS # BLD AUTO: 3.91 X10 (3) UL (ref 1.5–7.7)
NEUTROPHILS # BLD AUTO: 3.91 X10(3) UL (ref 1.5–7.7)
NEUTROPHILS # BLD AUTO: 4.04 X10 (3) UL (ref 1.5–7.7)
NEUTROPHILS # BLD AUTO: 4.04 X10(3) UL (ref 1.5–7.7)
NEUTROPHILS # BLD AUTO: 4.16 X10 (3) UL (ref 1.5–7.7)
NEUTROPHILS # BLD AUTO: 4.16 X10(3) UL (ref 1.5–7.7)
NEUTROPHILS # BLD AUTO: 4.24 X10 (3) UL (ref 1.5–7.7)
NEUTROPHILS # BLD AUTO: 4.24 X10(3) UL (ref 1.5–7.7)
NEUTROPHILS # BLD AUTO: 4.27 X10 (3) UL (ref 1.5–7.7)
NEUTROPHILS # BLD AUTO: 4.27 X10 (3) UL (ref 1.5–7.7)
NEUTROPHILS # BLD AUTO: 4.27 X10(3) UL (ref 1.5–7.7)
NEUTROPHILS # BLD AUTO: 4.27 X10(3) UL (ref 1.5–7.7)
NEUTROPHILS # BLD AUTO: 4.3 X10 (3) UL (ref 1.5–7.7)
NEUTROPHILS # BLD AUTO: 4.3 X10(3) UL (ref 1.5–7.7)
NEUTROPHILS # BLD AUTO: 4.35 X10 (3) UL (ref 1.5–7.7)
NEUTROPHILS # BLD AUTO: 4.35 X10(3) UL (ref 1.5–7.7)
NEUTROPHILS # BLD AUTO: 4.38 X10 (3) UL (ref 1.5–7.7)
NEUTROPHILS # BLD AUTO: 4.38 X10(3) UL (ref 1.5–7.7)
NEUTROPHILS # BLD AUTO: 4.76 X10 (3) UL (ref 1.5–7.7)
NEUTROPHILS # BLD AUTO: 4.76 X10 (3) UL (ref 1.5–7.7)
NEUTROPHILS # BLD AUTO: 4.76 X10(3) UL (ref 1.5–7.7)
NEUTROPHILS # BLD AUTO: 4.76 X10(3) UL (ref 1.5–7.7)
NEUTROPHILS # BLD AUTO: 4.88 X10 (3) UL (ref 1.5–7.7)
NEUTROPHILS # BLD AUTO: 4.88 X10(3) UL (ref 1.5–7.7)
NEUTROPHILS # BLD AUTO: 4.92 X10 (3) UL (ref 1.5–7.7)
NEUTROPHILS # BLD AUTO: 4.92 X10(3) UL (ref 1.5–7.7)
NEUTROPHILS # BLD AUTO: 5.27 X10 (3) UL (ref 1.5–7.7)
NEUTROPHILS # BLD AUTO: 5.27 X10(3) UL (ref 1.5–7.7)
NEUTROPHILS # BLD AUTO: 5.32 X10 (3) UL (ref 1.5–7.7)
NEUTROPHILS # BLD AUTO: 5.32 X10(3) UL (ref 1.5–7.7)
NEUTROPHILS # BLD AUTO: 5.36 X10 (3) UL (ref 1.5–7.7)
NEUTROPHILS # BLD AUTO: 5.36 X10(3) UL (ref 1.5–7.7)
NEUTROPHILS # BLD AUTO: 5.64 X10 (3) UL (ref 1.5–7.7)
NEUTROPHILS # BLD AUTO: 5.64 X10(3) UL (ref 1.5–7.7)
NEUTROPHILS # BLD AUTO: 5.8 X10 (3) UL (ref 1.5–7.7)
NEUTROPHILS # BLD AUTO: 5.8 X10 (3) UL (ref 1.5–7.7)
NEUTROPHILS # BLD AUTO: 5.8 X10(3) UL (ref 1.5–7.7)
NEUTROPHILS # BLD AUTO: 5.8 X10(3) UL (ref 1.5–7.7)
NEUTROPHILS # BLD AUTO: 6.11 X10 (3) UL (ref 1.5–7.7)
NEUTROPHILS # BLD AUTO: 6.11 X10(3) UL (ref 1.5–7.7)
NEUTROPHILS # BLD AUTO: 6.44 X10 (3) UL (ref 1.5–7.7)
NEUTROPHILS # BLD AUTO: 6.44 X10(3) UL (ref 1.5–7.7)
NEUTROPHILS # BLD AUTO: 6.77 X10 (3) UL (ref 1.5–7.7)
NEUTROPHILS # BLD AUTO: 6.77 X10(3) UL (ref 1.5–7.7)
NEUTROPHILS # BLD AUTO: 7.32 X10 (3) UL (ref 1.5–7.7)
NEUTROPHILS # BLD AUTO: 7.32 X10(3) UL (ref 1.5–7.7)
NEUTROPHILS NFR BLD AUTO: 58.3 %
NEUTROPHILS NFR BLD AUTO: 71.1 %
NEUTROPHILS NFR BLD AUTO: 71.6 %
NEUTROPHILS NFR BLD AUTO: 71.8 %
NEUTROPHILS NFR BLD AUTO: 74.3 %
NEUTROPHILS NFR BLD AUTO: 74.8 %
NEUTROPHILS NFR BLD AUTO: 75 %
NEUTROPHILS NFR BLD AUTO: 75.1 %
NEUTROPHILS NFR BLD AUTO: 76.7 %
NEUTROPHILS NFR BLD AUTO: 77.6 %
NEUTROPHILS NFR BLD AUTO: 79 %
NEUTROPHILS NFR BLD AUTO: 80 %
NEUTROPHILS NFR BLD AUTO: 80 %
NEUTROPHILS NFR BLD AUTO: 80.1 %
NEUTROPHILS NFR BLD AUTO: 80.2 %
NEUTROPHILS NFR BLD AUTO: 80.2 %
NEUTROPHILS NFR BLD AUTO: 80.6 %
NEUTROPHILS NFR BLD AUTO: 82.1 %
NEUTROPHILS NFR BLD AUTO: 82.2 %
NEUTROPHILS NFR BLD AUTO: 83 %
NEUTROPHILS NFR BLD AUTO: 83.5 %
NEUTROPHILS NFR BLD AUTO: 84.5 %
NEUTROPHILS NFR BLD AUTO: 84.8 %
NEUTROPHILS NFR BLD AUTO: 85 %
NEUTROPHILS NFR BLD AUTO: 85.3 %
NEUTROPHILS NFR BLD AUTO: 86.3 %
NITRITE UR QL STRIP.AUTO: NEGATIVE
NITRITE UR QL STRIP.AUTO: NEGATIVE
NONHDLC SERPL-MCNC: 125 MG/DL (ref ?–130)
NONHDLC SERPL-MCNC: 53 MG/DL (ref ?–130)
NONHDLC SERPL-MCNC: 75 MG/DL (ref ?–130)
NT-PROBNP SERPL-MCNC: 6593 PG/ML (ref ?–450)
NT-PROBNP SERPL-MCNC: ABNORMAL PG/ML (ref ?–450)
NT-PROBNP SERPL-MCNC: ABNORMAL PG/ML (ref ?–450)
O2 CT BLD-SCNC: 12.4 VOL% (ref 15–23)
O2 CT BLD-SCNC: 14.5 VOL% (ref 15–23)
O2 CT BLD-SCNC: 18.1 VOL% (ref 15–23)
O2 CT BLD-SCNC: 19.6 VOL% (ref 15–23)
O2/TOTAL GAS SETTING VFR VENT: 100 %
O2/TOTAL GAS SETTING VFR VENT: 30 %
OSMOLALITY SERPL CALC.SUM OF ELEC: 294 MOSM/KG (ref 275–295)
OSMOLALITY SERPL CALC.SUM OF ELEC: 295 MOSM/KG (ref 275–295)
OSMOLALITY SERPL CALC.SUM OF ELEC: 297 MOSM/KG (ref 275–295)
OSMOLALITY SERPL CALC.SUM OF ELEC: 298 MOSM/KG (ref 275–295)
OSMOLALITY SERPL CALC.SUM OF ELEC: 299 MOSM/KG (ref 275–295)
OSMOLALITY SERPL CALC.SUM OF ELEC: 300 MOSM/KG (ref 275–295)
OSMOLALITY SERPL CALC.SUM OF ELEC: 300 MOSM/KG (ref 275–295)
OSMOLALITY SERPL CALC.SUM OF ELEC: 301 MOSM/KG (ref 275–295)
OSMOLALITY SERPL CALC.SUM OF ELEC: 302 MOSM/KG (ref 275–295)
OSMOLALITY SERPL CALC.SUM OF ELEC: 303 MOSM/KG (ref 275–295)
OSMOLALITY SERPL CALC.SUM OF ELEC: 304 MOSM/KG (ref 275–295)
OSMOLALITY SERPL CALC.SUM OF ELEC: 304 MOSM/KG (ref 275–295)
OSMOLALITY SERPL CALC.SUM OF ELEC: 305 MOSM/KG (ref 275–295)
OSMOLALITY SERPL CALC.SUM OF ELEC: 306 MOSM/KG (ref 275–295)
OSMOLALITY SERPL CALC.SUM OF ELEC: 306 MOSM/KG (ref 275–295)
OSMOLALITY SERPL CALC.SUM OF ELEC: 307 MOSM/KG (ref 275–295)
OSMOLALITY SERPL CALC.SUM OF ELEC: 319 MOSM/KG (ref 275–295)
OSMOLALITY SERPL CALC.SUM OF ELEC: 328 MOSM/KG (ref 275–295)
OSMOLALITY SERPL CALC.SUM OF ELEC: 328 MOSM/KG (ref 275–295)
OXYGEN LITERS/MINUTE: 2 L/MIN
OXYGEN LITERS/MINUTE: 3 L/MIN
PCO2 BLDA: 40 MM HG (ref 35–45)
PCO2 BLDA: 53 MM HG (ref 35–45)
PCO2 BLDA: 58 MM HG (ref 35–45)
PCO2 BLDA: 59 MM HG (ref 35–45)
PEEP SETTING VENT: 5 CM H2O
PEEP SETTING VENT: 5 CM H2O
PH BLDA: 7.18 [PH] (ref 7.35–7.45)
PH BLDA: 7.27 [PH] (ref 7.35–7.45)
PH BLDA: 7.44 [PH] (ref 7.35–7.45)
PH BLDA: 7.53 [PH] (ref 7.35–7.45)
PH UR: 6 [PH] (ref 5–8)
PH UR: 6 [PH] (ref 5–8)
PHOSPHATE SERPL-MCNC: 2 MG/DL (ref 2.5–4.9)
PHOSPHATE SERPL-MCNC: 2.4 MG/DL (ref 2.5–4.9)
PHOSPHATE SERPL-MCNC: 2.6 MG/DL (ref 2.5–4.9)
PLATELET # BLD AUTO: 126 10(3)UL (ref 150–450)
PLATELET # BLD AUTO: 126 10(3)UL (ref 150–450)
PLATELET # BLD AUTO: 129 10(3)UL (ref 150–450)
PLATELET # BLD AUTO: 129 10(3)UL (ref 150–450)
PLATELET # BLD AUTO: 132 10(3)UL (ref 150–450)
PLATELET # BLD AUTO: 138 10(3)UL (ref 150–450)
PLATELET # BLD AUTO: 144 10(3)UL (ref 150–450)
PLATELET # BLD AUTO: 153 10(3)UL (ref 150–450)
PLATELET # BLD AUTO: 153 10(3)UL (ref 150–450)
PLATELET # BLD AUTO: 163 10(3)UL (ref 150–450)
PLATELET # BLD AUTO: 170 10(3)UL (ref 150–450)
PLATELET # BLD AUTO: 187 10(3)UL (ref 150–450)
PLATELET # BLD AUTO: 209 10(3)UL (ref 150–450)
PLATELET # BLD AUTO: 210 10(3)UL (ref 150–450)
PLATELET # BLD AUTO: 217 10(3)UL (ref 150–450)
PLATELET # BLD AUTO: 238 10(3)UL (ref 150–450)
PLATELET # BLD AUTO: 248 10(3)UL (ref 150–450)
PLATELET # BLD AUTO: 260 10(3)UL (ref 150–450)
PLATELET # BLD AUTO: 266 10(3)UL (ref 150–450)
PLATELET # BLD AUTO: 267 10(3)UL (ref 150–450)
PLATELET # BLD AUTO: 268 10(3)UL (ref 150–450)
PLATELET # BLD AUTO: 276 10(3)UL (ref 150–450)
PLATELET # BLD AUTO: 280 10(3)UL (ref 150–450)
PLATELET # BLD AUTO: 285 10(3)UL (ref 150–450)
PLATELET # BLD AUTO: 286 10(3)UL (ref 150–450)
PLATELET # BLD AUTO: 290 10(3)UL (ref 150–450)
PLATELET # BLD AUTO: 291 10(3)UL (ref 150–450)
PLATELET # BLD AUTO: 292 10(3)UL (ref 150–450)
PLATELET # BLD AUTO: 296 10(3)UL (ref 150–450)
PLATELET # BLD AUTO: 302 10(3)UL (ref 150–450)
PLATELET # BLD AUTO: 305 10(3)UL (ref 150–450)
PLATELET # BLD AUTO: 319 10(3)UL (ref 150–450)
PLATELET MORPHOLOGY: NORMAL
PLATELET MORPHOLOGY: NORMAL
PO2 BLDA: 117 MM HG (ref 80–100)
PO2 BLDA: 125 MM HG (ref 80–100)
PO2 BLDA: 63 MM HG (ref 80–100)
PO2 BLDA: 82 MM HG (ref 80–100)
POTASSIUM (BLOOD GAS): 4.3 MMOL/L (ref 3.3–5.1)
POTASSIUM SERPL-SCNC: 3.2 MMOL/L (ref 3.5–5.1)
POTASSIUM SERPL-SCNC: 3.2 MMOL/L (ref 3.5–5.1)
POTASSIUM SERPL-SCNC: 3.3 MMOL/L (ref 3.5–5.1)
POTASSIUM SERPL-SCNC: 3.4 MMOL/L (ref 3.5–5.1)
POTASSIUM SERPL-SCNC: 3.5 MMOL/L (ref 3.5–5.1)
POTASSIUM SERPL-SCNC: 3.6 MMOL/L (ref 3.5–5.1)
POTASSIUM SERPL-SCNC: 3.7 MMOL/L (ref 3.5–5.1)
POTASSIUM SERPL-SCNC: 3.8 MMOL/L (ref 3.5–5.1)
POTASSIUM SERPL-SCNC: 3.9 MMOL/L (ref 3.5–5.1)
POTASSIUM SERPL-SCNC: 4 MMOL/L (ref 3.5–5.1)
POTASSIUM SERPL-SCNC: 4.1 MMOL/L (ref 3.5–5.1)
POTASSIUM SERPL-SCNC: 4.2 MMOL/L (ref 3.5–5.1)
POTASSIUM SERPL-SCNC: 4.3 MMOL/L (ref 3.5–5.1)
POTASSIUM SERPL-SCNC: 4.4 MMOL/L (ref 3.5–5.1)
POTASSIUM SERPL-SCNC: 4.5 MMOL/L (ref 3.5–5.1)
POTASSIUM SERPL-SCNC: 4.5 MMOL/L (ref 3.5–5.1)
POTASSIUM SERPL-SCNC: 4.6 MMOL/L (ref 3.5–5.1)
POTASSIUM SERPL-SCNC: 5.8 MMOL/L (ref 3.5–5.1)
PROCALCITONIN SERPL-MCNC: 0.03 NG/ML (ref ?–0.16)
PROCALCITONIN SERPL-MCNC: <0.02 NG/ML (ref ?–0.16)
PROT UR-MCNC: 100 MG/DL
PROT UR-MCNC: NEGATIVE MG/DL
PROTHROMBIN TIME: 14.6 SECONDS (ref 11.8–14.5)
PROTHROMBIN TIME: 14.8 SECONDS (ref 11.8–14.5)
PROTHROMBIN TIME: 16 SECONDS (ref 11.8–14.5)
PSA SERPL-MCNC: 0.03 NG/ML (ref ?–4)
PUNCTURE CHARGE: YES
RBC # BLD AUTO: 2.75 X10(6)UL
RBC # BLD AUTO: 2.95 X10(6)UL
RBC # BLD AUTO: 3.02 X10(6)UL
RBC # BLD AUTO: 3.02 X10(6)UL
RBC # BLD AUTO: 3.05 X10(6)UL
RBC # BLD AUTO: 3.11 X10(6)UL
RBC # BLD AUTO: 3.13 X10(6)UL
RBC # BLD AUTO: 3.14 X10(6)UL
RBC # BLD AUTO: 3.15 X10(6)UL
RBC # BLD AUTO: 3.15 X10(6)UL
RBC # BLD AUTO: 3.16 X10(6)UL
RBC # BLD AUTO: 3.24 X10(6)UL
RBC # BLD AUTO: 3.26 X10(6)UL
RBC # BLD AUTO: 3.27 X10(6)UL
RBC # BLD AUTO: 3.27 X10(6)UL
RBC # BLD AUTO: 3.28 X10(6)UL
RBC # BLD AUTO: 3.32 X10(6)UL
RBC # BLD AUTO: 3.34 X10(6)UL
RBC # BLD AUTO: 3.35 X10(6)UL
RBC # BLD AUTO: 3.37 X10(6)UL
RBC # BLD AUTO: 3.43 X10(6)UL
RBC # BLD AUTO: 3.45 X10(6)UL
RBC # BLD AUTO: 3.5 X10(6)UL
RBC # BLD AUTO: 3.54 X10(6)UL
RBC # BLD AUTO: 3.7 X10(6)UL
RBC # BLD AUTO: 3.76 X10(6)UL
RBC # BLD AUTO: 4.18 X10(6)UL
RBC # BLD AUTO: 4.27 X10(6)UL
RBC # BLD AUTO: 4.45 X10(6)UL
RBC #/AREA URNS AUTO: 3 /HPF
RESP RATE: 15 BPM
RESP RATE: 15 BPM
RH BLOOD TYPE: POSITIVE
SAO2 % BLDA: 94.6 % (ref 94–100)
SAO2 % BLDA: 98.3 % (ref 94–100)
SAO2 % BLDA: 98.6 % (ref 94–100)
SAO2 % BLDA: >99 % (ref 94–100)
SARS-COV-2 RNA RESP QL NAA+PROBE: NOT DETECTED
SODIUM (BLOOD GAS): 138 MMOL/L (ref 135–145)
SODIUM SERPL-SCNC: 141 MMOL/L (ref 136–145)
SODIUM SERPL-SCNC: 141 MMOL/L (ref 136–145)
SODIUM SERPL-SCNC: 142 MMOL/L (ref 136–145)
SODIUM SERPL-SCNC: 143 MMOL/L (ref 136–145)
SODIUM SERPL-SCNC: 144 MMOL/L (ref 136–145)
SODIUM SERPL-SCNC: 145 MMOL/L (ref 136–145)
SODIUM SERPL-SCNC: 146 MMOL/L (ref 136–145)
SODIUM SERPL-SCNC: 147 MMOL/L (ref 136–145)
SODIUM SERPL-SCNC: 151 MMOL/L (ref 136–145)
SODIUM SERPL-SCNC: 154 MMOL/L (ref 136–145)
SODIUM SERPL-SCNC: 155 MMOL/L (ref 136–145)
SP GR UR STRIP: 1.01 (ref 1–1.03)
SP GR UR STRIP: 1.01 (ref 1–1.03)
SPECIMEN VOL 24H UR: 500 ML
SPECIMEN VOL 24H UR: 500 ML
T4 FREE SERPL-MCNC: 0.9 NG/DL (ref 0.8–1.7)
T4 FREE SERPL-MCNC: 6.1 NG/DL (ref 0.8–1.7)
TOTAL IRON BINDING CAPACITY: 256 UG/DL (ref 240–450)
TOTAL IRON BINDING CAPACITY: 267 UG/DL (ref 240–450)
TRANSFERRIN SERPL-MCNC: 172 MG/DL (ref 200–360)
TRANSFERRIN SERPL-MCNC: 179 MG/DL (ref 200–360)
TRIGL SERPL-MCNC: 154 MG/DL (ref 30–149)
TRIGL SERPL-MCNC: 74 MG/DL (ref 30–149)
TRIGL SERPL-MCNC: 98 MG/DL (ref 30–149)
TROPONIN I SERPL-MCNC: 0.05 NG/ML (ref ?–0.04)
TROPONIN I SERPL-MCNC: 0.07 NG/ML (ref ?–0.04)
TROPONIN I SERPL-MCNC: 0.46 NG/ML (ref ?–0.04)
TROPONIN I SERPL-MCNC: 0.5 NG/ML (ref ?–0.04)
TROPONIN I SERPL-MCNC: <0.045 NG/ML (ref ?–0.04)
TSI SER-ACNC: 0.05 MIU/ML (ref 0.36–3.74)
TSI SER-ACNC: <0.005 MIU/ML (ref 0.36–3.74)
UROBILINOGEN UR STRIP-ACNC: <2
UROBILINOGEN UR STRIP-ACNC: <2
VIT B12 SERPL-MCNC: 405 PG/ML (ref 193–986)
VLDLC SERPL CALC-MCNC: 14 MG/DL (ref 0–30)
VLDLC SERPL CALC-MCNC: 15 MG/DL (ref 0–30)
VLDLC SERPL CALC-MCNC: 31 MG/DL (ref 0–30)
WBC # BLD AUTO: 5 X10(3) UL (ref 4–11)
WBC # BLD AUTO: 5.1 X10(3) UL (ref 4–11)
WBC # BLD AUTO: 5.1 X10(3) UL (ref 4–11)
WBC # BLD AUTO: 5.4 X10(3) UL (ref 4–11)
WBC # BLD AUTO: 5.4 X10(3) UL (ref 4–11)
WBC # BLD AUTO: 5.5 X10(3) UL (ref 4–11)
WBC # BLD AUTO: 5.6 X10(3) UL (ref 4–11)
WBC # BLD AUTO: 5.7 X10(3) UL (ref 4–11)
WBC # BLD AUTO: 5.8 X10(3) UL (ref 4–11)
WBC # BLD AUTO: 5.9 X10(3) UL (ref 4–11)
WBC # BLD AUTO: 5.9 X10(3) UL (ref 4–11)
WBC # BLD AUTO: 6 X10(3) UL (ref 4–11)
WBC # BLD AUTO: 6 X10(3) UL (ref 4–11)
WBC # BLD AUTO: 6.1 X10(3) UL (ref 4–11)
WBC # BLD AUTO: 6.1 X10(3) UL (ref 4–11)
WBC # BLD AUTO: 6.6 X10(3) UL (ref 4–11)
WBC # BLD AUTO: 6.6 X10(3) UL (ref 4–11)
WBC # BLD AUTO: 6.7 X10(3) UL (ref 4–11)
WBC # BLD AUTO: 6.7 X10(3) UL (ref 4–11)
WBC # BLD AUTO: 6.9 X10(3) UL (ref 4–11)
WBC # BLD AUTO: 7 X10(3) UL (ref 4–11)
WBC # BLD AUTO: 7.1 X10(3) UL (ref 4–11)
WBC # BLD AUTO: 7.2 X10(3) UL (ref 4–11)
WBC # BLD AUTO: 7.6 X10(3) UL (ref 4–11)
WBC # BLD AUTO: 7.9 X10(3) UL (ref 4–11)
WBC # BLD AUTO: 8.1 X10(3) UL (ref 4–11)
WBC # BLD AUTO: 8.8 X10(3) UL (ref 4–11)
WBC #/AREA URNS AUTO: 3 /HPF

## 2021-01-01 PROCEDURE — 3074F SYST BP LT 130 MM HG: CPT | Performed by: INTERNAL MEDICINE

## 2021-01-01 PROCEDURE — 99233 SBSQ HOSP IP/OBS HIGH 50: CPT | Performed by: HOSPITALIST

## 2021-01-01 PROCEDURE — 4A0234Z MEASUREMENT OF CARDIAC ELECTRICAL ACTIVITY, PERCUTANEOUS APPROACH: ICD-10-PCS | Performed by: INTERNAL MEDICINE

## 2021-01-01 PROCEDURE — 99231 SBSQ HOSP IP/OBS SF/LOW 25: CPT | Performed by: REGISTERED NURSE

## 2021-01-01 PROCEDURE — 99232 SBSQ HOSP IP/OBS MODERATE 35: CPT | Performed by: INTERNAL MEDICINE

## 2021-01-01 PROCEDURE — 3008F BODY MASS INDEX DOCD: CPT | Performed by: INTERNAL MEDICINE

## 2021-01-01 PROCEDURE — 3078F DIAST BP <80 MM HG: CPT | Performed by: HOSPITALIST

## 2021-01-01 PROCEDURE — 99310 SBSQ NF CARE HIGH MDM 45: CPT | Performed by: NURSE PRACTITIONER

## 2021-01-01 PROCEDURE — 5A1945Z RESPIRATORY VENTILATION, 24-96 CONSECUTIVE HOURS: ICD-10-PCS | Performed by: EMERGENCY MEDICINE

## 2021-01-01 PROCEDURE — 3078F DIAST BP <80 MM HG: CPT | Performed by: INTERNAL MEDICINE

## 2021-01-01 PROCEDURE — 93306 TTE W/DOPPLER COMPLETE: CPT | Performed by: INTERNAL MEDICINE

## 2021-01-01 PROCEDURE — 99233 SBSQ HOSP IP/OBS HIGH 50: CPT | Performed by: INTERNAL MEDICINE

## 2021-01-01 PROCEDURE — 99232 SBSQ HOSP IP/OBS MODERATE 35: CPT | Performed by: HOSPITALIST

## 2021-01-01 PROCEDURE — 71045 X-RAY EXAM CHEST 1 VIEW: CPT | Performed by: INTERNAL MEDICINE

## 2021-01-01 PROCEDURE — 36415 COLL VENOUS BLD VENIPUNCTURE: CPT | Performed by: NURSE PRACTITIONER

## 2021-01-01 PROCEDURE — 45380 COLONOSCOPY AND BIOPSY: CPT | Performed by: INTERNAL MEDICINE

## 2021-01-01 PROCEDURE — 99222 1ST HOSP IP/OBS MODERATE 55: CPT | Performed by: REGISTERED NURSE

## 2021-01-01 PROCEDURE — 99309 SBSQ NF CARE MODERATE MDM 30: CPT | Performed by: INTERNAL MEDICINE

## 2021-01-01 PROCEDURE — 3008F BODY MASS INDEX DOCD: CPT | Performed by: HOSPITALIST

## 2021-01-01 PROCEDURE — 99239 HOSP IP/OBS DSCHRG MGMT >30: CPT | Performed by: HOSPITALIST

## 2021-01-01 PROCEDURE — 99214 OFFICE O/P EST MOD 30 MIN: CPT | Performed by: INTERNAL MEDICINE

## 2021-01-01 PROCEDURE — 72125 CT NECK SPINE W/O DYE: CPT | Performed by: EMERGENCY MEDICINE

## 2021-01-01 PROCEDURE — 3074F SYST BP LT 130 MM HG: CPT | Performed by: HOSPITALIST

## 2021-01-01 PROCEDURE — 99223 1ST HOSP IP/OBS HIGH 75: CPT | Performed by: HOSPITALIST

## 2021-01-01 PROCEDURE — 4A023N7 MEASUREMENT OF CARDIAC SAMPLING AND PRESSURE, LEFT HEART, PERCUTANEOUS APPROACH: ICD-10-PCS | Performed by: INTERNAL MEDICINE

## 2021-01-01 PROCEDURE — 99223 1ST HOSP IP/OBS HIGH 75: CPT | Performed by: INTERNAL MEDICINE

## 2021-01-01 PROCEDURE — 99203 OFFICE O/P NEW LOW 30 MIN: CPT | Performed by: NURSE PRACTITIONER

## 2021-01-01 PROCEDURE — 70551 MRI BRAIN STEM W/O DYE: CPT | Performed by: OTHER

## 2021-01-01 PROCEDURE — 99291 CRITICAL CARE FIRST HOUR: CPT | Performed by: INTERNAL MEDICINE

## 2021-01-01 PROCEDURE — 99306 1ST NF CARE HIGH MDM 50: CPT | Performed by: INTERNAL MEDICINE

## 2021-01-01 PROCEDURE — 1123F ACP DISCUSS/DSCN MKR DOCD: CPT | Performed by: NURSE PRACTITIONER

## 2021-01-01 PROCEDURE — 0DBP8ZX EXCISION OF RECTUM, VIA NATURAL OR ARTIFICIAL OPENING ENDOSCOPIC, DIAGNOSTIC: ICD-10-PCS | Performed by: INTERNAL MEDICINE

## 2021-01-01 PROCEDURE — B2111ZZ FLUOROSCOPY OF MULTIPLE CORONARY ARTERIES USING LOW OSMOLAR CONTRAST: ICD-10-PCS | Performed by: INTERNAL MEDICINE

## 2021-01-01 PROCEDURE — 71046 X-RAY EXAM CHEST 2 VIEWS: CPT | Performed by: HOSPITALIST

## 2021-01-01 PROCEDURE — 30233N1 TRANSFUSION OF NONAUTOLOGOUS RED BLOOD CELLS INTO PERIPHERAL VEIN, PERCUTANEOUS APPROACH: ICD-10-PCS | Performed by: HOSPITALIST

## 2021-01-01 PROCEDURE — 85025 COMPLETE CBC W/AUTO DIFF WBC: CPT | Performed by: NURSE PRACTITIONER

## 2021-01-01 PROCEDURE — 99233 SBSQ HOSP IP/OBS HIGH 50: CPT | Performed by: OTHER

## 2021-01-01 PROCEDURE — 99231 SBSQ HOSP IP/OBS SF/LOW 25: CPT | Performed by: OTHER

## 2021-01-01 PROCEDURE — 02H633Z INSERTION OF INFUSION DEVICE INTO RIGHT ATRIUM, PERCUTANEOUS APPROACH: ICD-10-PCS | Performed by: EMERGENCY MEDICINE

## 2021-01-01 PROCEDURE — 74177 CT ABD & PELVIS W/CONTRAST: CPT | Performed by: INTERNAL MEDICINE

## 2021-01-01 PROCEDURE — 71045 X-RAY EXAM CHEST 1 VIEW: CPT | Performed by: EMERGENCY MEDICINE

## 2021-01-01 PROCEDURE — 99232 SBSQ HOSP IP/OBS MODERATE 35: CPT | Performed by: PHYSICIAN ASSISTANT

## 2021-01-01 PROCEDURE — 0BH17EZ INSERTION OF ENDOTRACHEAL AIRWAY INTO TRACHEA, VIA NATURAL OR ARTIFICIAL OPENING: ICD-10-PCS | Performed by: EMERGENCY MEDICINE

## 2021-01-01 PROCEDURE — 02H63JZ INSERTION OF PACEMAKER LEAD INTO RIGHT ATRIUM, PERCUTANEOUS APPROACH: ICD-10-PCS | Performed by: INTERNAL MEDICINE

## 2021-01-01 PROCEDURE — 93308 TTE F-UP OR LMTD: CPT | Performed by: NURSE PRACTITIONER

## 2021-01-01 PROCEDURE — 02K83ZZ MAP CONDUCTION MECHANISM, PERCUTANEOUS APPROACH: ICD-10-PCS | Performed by: INTERNAL MEDICINE

## 2021-01-01 PROCEDURE — 02HV33Z INSERTION OF INFUSION DEVICE INTO SUPERIOR VENA CAVA, PERCUTANEOUS APPROACH: ICD-10-PCS | Performed by: HOSPITALIST

## 2021-01-01 PROCEDURE — 83880 ASSAY OF NATRIURETIC PEPTIDE: CPT | Performed by: NURSE PRACTITIONER

## 2021-01-01 PROCEDURE — 02583ZZ DESTRUCTION OF CONDUCTION MECHANISM, PERCUTANEOUS APPROACH: ICD-10-PCS | Performed by: INTERNAL MEDICINE

## 2021-01-01 PROCEDURE — 0JH606Z INSERTION OF PACEMAKER, DUAL CHAMBER INTO CHEST SUBCUTANEOUS TISSUE AND FASCIA, OPEN APPROACH: ICD-10-PCS | Performed by: INTERNAL MEDICINE

## 2021-01-01 PROCEDURE — 99223 1ST HOSP IP/OBS HIGH 75: CPT | Performed by: OTHER

## 2021-01-01 PROCEDURE — 99215 OFFICE O/P EST HI 40 MIN: CPT | Performed by: INTERNAL MEDICINE

## 2021-01-01 PROCEDURE — 1111F DSCHRG MED/CURRENT MED MERGE: CPT | Performed by: INTERNAL MEDICINE

## 2021-01-01 PROCEDURE — 99222 1ST HOSP IP/OBS MODERATE 55: CPT | Performed by: INTERNAL MEDICINE

## 2021-01-01 PROCEDURE — 1111F DSCHRG MED/CURRENT MED MERGE: CPT | Performed by: NURSE PRACTITIONER

## 2021-01-01 PROCEDURE — 99291 CRITICAL CARE FIRST HOUR: CPT | Performed by: OTHER

## 2021-01-01 PROCEDURE — 74230 X-RAY XM SWLNG FUNCJ C+: CPT | Performed by: HOSPITALIST

## 2021-01-01 PROCEDURE — 93971 EXTREMITY STUDY: CPT | Performed by: HOSPITALIST

## 2021-01-01 PROCEDURE — 0DBG8ZX EXCISION OF LEFT LARGE INTESTINE, VIA NATURAL OR ARTIFICIAL OPENING ENDOSCOPIC, DIAGNOSTIC: ICD-10-PCS | Performed by: INTERNAL MEDICINE

## 2021-01-01 PROCEDURE — 80048 BASIC METABOLIC PNL TOTAL CA: CPT | Performed by: NURSE PRACTITIONER

## 2021-01-01 PROCEDURE — B2151ZZ FLUOROSCOPY OF LEFT HEART USING LOW OSMOLAR CONTRAST: ICD-10-PCS | Performed by: INTERNAL MEDICINE

## 2021-01-01 PROCEDURE — 70450 CT HEAD/BRAIN W/O DYE: CPT | Performed by: EMERGENCY MEDICINE

## 2021-01-01 PROCEDURE — 02HK3JZ INSERTION OF PACEMAKER LEAD INTO RIGHT VENTRICLE, PERCUTANEOUS APPROACH: ICD-10-PCS | Performed by: INTERNAL MEDICINE

## 2021-01-01 PROCEDURE — 99202 OFFICE O/P NEW SF 15 MIN: CPT | Performed by: NURSE PRACTITIONER

## 2021-01-01 PROCEDURE — 71046 X-RAY EXAM CHEST 2 VIEWS: CPT | Performed by: INTERNAL MEDICINE

## 2021-01-01 RX ORDER — SODIUM CHLORIDE, SODIUM LACTATE, POTASSIUM CHLORIDE, CALCIUM CHLORIDE 600; 310; 30; 20 MG/100ML; MG/100ML; MG/100ML; MG/100ML
INJECTION, SOLUTION INTRAVENOUS CONTINUOUS
Status: DISCONTINUED | OUTPATIENT
Start: 2021-01-01 | End: 2021-01-01

## 2021-01-01 RX ORDER — METOPROLOL SUCCINATE 50 MG/1
50 TABLET, EXTENDED RELEASE ORAL
Status: DISCONTINUED | OUTPATIENT
Start: 2021-01-01 | End: 2021-01-01

## 2021-01-01 RX ORDER — TRIAMTERENE AND HYDROCHLOROTHIAZIDE 37.5; 25 MG/1; MG/1
1 CAPSULE ORAL EVERY MORNING
Status: ON HOLD | COMMUNITY
End: 2021-01-01

## 2021-01-01 RX ORDER — LEVOFLOXACIN 5 MG/ML
750 INJECTION, SOLUTION INTRAVENOUS
Status: DISCONTINUED | OUTPATIENT
Start: 2021-01-01 | End: 2021-01-01 | Stop reason: ALTCHOICE

## 2021-01-01 RX ORDER — LISINOPRIL 5 MG/1
5 TABLET ORAL DAILY
Qty: 30 TABLET | Refills: 0 | Status: ON HOLD | OUTPATIENT
Start: 2021-01-01 | End: 2021-01-01

## 2021-01-01 RX ORDER — POTASSIUM CHLORIDE 20 MEQ/1
40 TABLET, EXTENDED RELEASE ORAL EVERY 4 HOURS
Status: COMPLETED | OUTPATIENT
Start: 2021-01-01 | End: 2021-01-01

## 2021-01-01 RX ORDER — CLOPIDOGREL BISULFATE 75 MG/1
75 TABLET ORAL DAILY
Status: DISCONTINUED | OUTPATIENT
Start: 2021-01-01 | End: 2021-01-01

## 2021-01-01 RX ORDER — NALOXONE HYDROCHLORIDE 0.4 MG/ML
80 INJECTION, SOLUTION INTRAMUSCULAR; INTRAVENOUS; SUBCUTANEOUS AS NEEDED
Status: ACTIVE | OUTPATIENT
Start: 2021-01-01 | End: 2021-01-01

## 2021-01-01 RX ORDER — BISACODYL 10 MG
10 SUPPOSITORY, RECTAL RECTAL
Status: DISCONTINUED | OUTPATIENT
Start: 2021-01-01 | End: 2021-01-01

## 2021-01-01 RX ORDER — TORSEMIDE 10 MG/1
10 TABLET ORAL DAILY
Qty: 30 TABLET | Refills: 0 | Status: ON HOLD | OUTPATIENT
Start: 2021-01-01 | End: 2021-01-01

## 2021-01-01 RX ORDER — CEFAZOLIN SODIUM/WATER 2 G/20 ML
2 SYRINGE (ML) INTRAVENOUS
Status: COMPLETED | OUTPATIENT
Start: 2021-01-01 | End: 2021-01-01

## 2021-01-01 RX ORDER — AMOXICILLIN 250 MG
1 CAPSULE ORAL NIGHTLY
Status: ON HOLD | COMMUNITY
End: 2021-01-01

## 2021-01-01 RX ORDER — TRAMADOL HYDROCHLORIDE 50 MG/1
100 TABLET ORAL EVERY 12 HOURS PRN
Status: DISCONTINUED | OUTPATIENT
Start: 2021-01-01 | End: 2021-01-01

## 2021-01-01 RX ORDER — ACETAMINOPHEN 325 MG/1
650 TABLET ORAL EVERY 6 HOURS PRN
Status: DISCONTINUED | OUTPATIENT
Start: 2021-01-01 | End: 2021-01-01

## 2021-01-01 RX ORDER — ATORVASTATIN CALCIUM 20 MG/1
20 TABLET, FILM COATED ORAL NIGHTLY
Status: DISCONTINUED | OUTPATIENT
Start: 2021-01-01 | End: 2021-01-01

## 2021-01-01 RX ORDER — SODIUM CHLORIDE 9 MG/ML
INJECTION, SOLUTION INTRAVENOUS
Status: DISCONTINUED | OUTPATIENT
Start: 2021-01-01 | End: 2021-01-01

## 2021-01-01 RX ORDER — KETOROLAC TROMETHAMINE 15 MG/ML
15 INJECTION, SOLUTION INTRAMUSCULAR; INTRAVENOUS ONCE
Status: COMPLETED | OUTPATIENT
Start: 2021-01-01 | End: 2021-01-01

## 2021-01-01 RX ORDER — AMLODIPINE BESYLATE 5 MG/1
5 TABLET ORAL DAILY
Status: ON HOLD | COMMUNITY
End: 2021-01-01

## 2021-01-01 RX ORDER — METOPROLOL SUCCINATE 100 MG/1
100 TABLET, EXTENDED RELEASE ORAL
Status: DISCONTINUED | OUTPATIENT
Start: 2021-01-01 | End: 2021-01-01

## 2021-01-01 RX ORDER — ASPIRIN 81 MG/1
81 TABLET, CHEWABLE ORAL DAILY
Status: DISCONTINUED | OUTPATIENT
Start: 2021-01-01 | End: 2021-01-01

## 2021-01-01 RX ORDER — DEXTROSE MONOHYDRATE 25 G/50ML
50 INJECTION, SOLUTION INTRAVENOUS
Status: DISCONTINUED | OUTPATIENT
Start: 2021-01-01 | End: 2021-01-01

## 2021-01-01 RX ORDER — METOPROLOL SUCCINATE 50 MG/1
50 TABLET, EXTENDED RELEASE ORAL ONCE
Status: DISCONTINUED | OUTPATIENT
Start: 2021-01-01 | End: 2021-01-01

## 2021-01-01 RX ORDER — POTASSIUM CHLORIDE 14.9 MG/ML
20 INJECTION INTRAVENOUS ONCE
Status: DISCONTINUED | OUTPATIENT
Start: 2021-01-01 | End: 2021-01-01

## 2021-01-01 RX ORDER — LISINOPRIL 5 MG/1
5 TABLET ORAL DAILY
Qty: 30 TABLET | Refills: 0 | Status: SHIPPED | OUTPATIENT
Start: 2021-01-01 | End: 2021-01-01

## 2021-01-01 RX ORDER — TORSEMIDE 5 MG/1
10 TABLET ORAL DAILY
Status: DISCONTINUED | OUTPATIENT
Start: 2021-01-01 | End: 2021-01-01

## 2021-01-01 RX ORDER — AMIODARONE HYDROCHLORIDE 200 MG/1
200 TABLET ORAL DAILY
Qty: 30 TABLET | Refills: 0 | Status: ON HOLD | OUTPATIENT
Start: 2021-01-01 | End: 2021-01-01

## 2021-01-01 RX ORDER — METOCLOPRAMIDE HYDROCHLORIDE 5 MG/ML
10 INJECTION INTRAMUSCULAR; INTRAVENOUS EVERY 8 HOURS PRN
Status: DISCONTINUED | OUTPATIENT
Start: 2021-01-01 | End: 2021-01-01

## 2021-01-01 RX ORDER — LISINOPRIL 5 MG/1
5 TABLET ORAL DAILY
Status: DISCONTINUED | OUTPATIENT
Start: 2021-01-01 | End: 2021-01-01

## 2021-01-01 RX ORDER — SPIRONOLACTONE 25 MG/1
25 TABLET ORAL DAILY
Status: DISCONTINUED | OUTPATIENT
Start: 2021-01-01 | End: 2021-01-01

## 2021-01-01 RX ORDER — CHLORHEXIDINE GLUCONATE 4 G/100ML
30 SOLUTION TOPICAL
Status: COMPLETED | OUTPATIENT
Start: 2021-01-01 | End: 2021-01-01

## 2021-01-01 RX ORDER — NITROGLYCERIN 0.4 MG/1
0.4 TABLET SUBLINGUAL EVERY 5 MIN PRN
Status: DISCONTINUED | OUTPATIENT
Start: 2021-01-01 | End: 2021-01-01

## 2021-01-01 RX ORDER — CEFAZOLIN SODIUM/WATER 2 G/20 ML
SYRINGE (ML) INTRAVENOUS
Status: COMPLETED
Start: 2021-01-01 | End: 2021-01-01

## 2021-01-01 RX ORDER — WARFARIN SODIUM 5 MG/1
5 TABLET ORAL NIGHTLY
Status: DISCONTINUED | OUTPATIENT
Start: 2021-01-01 | End: 2021-01-01

## 2021-01-01 RX ORDER — TRAMADOL HYDROCHLORIDE 50 MG/1
50 TABLET ORAL EVERY 6 HOURS PRN
Status: DISCONTINUED | OUTPATIENT
Start: 2021-01-01 | End: 2021-01-01

## 2021-01-01 RX ORDER — METOPROLOL TARTRATE 5 MG/5ML
5 INJECTION INTRAVENOUS EVERY 6 HOURS
Status: DISCONTINUED | OUTPATIENT
Start: 2021-01-01 | End: 2021-01-01

## 2021-01-01 RX ORDER — AMIODARONE HYDROCHLORIDE 200 MG/1
200 TABLET ORAL 2 TIMES DAILY WITH MEALS
Status: DISCONTINUED | OUTPATIENT
Start: 2021-01-01 | End: 2021-01-01

## 2021-01-01 RX ORDER — METOPROLOL SUCCINATE 25 MG/1
25 TABLET, EXTENDED RELEASE ORAL ONCE
Status: COMPLETED | OUTPATIENT
Start: 2021-01-01 | End: 2021-01-01

## 2021-01-01 RX ORDER — ONDANSETRON 2 MG/ML
4 INJECTION INTRAMUSCULAR; INTRAVENOUS ONCE AS NEEDED
Status: ACTIVE | OUTPATIENT
Start: 2021-01-01 | End: 2021-01-01

## 2021-01-01 RX ORDER — METOPROLOL TARTRATE 5 MG/5ML
7.5 INJECTION INTRAVENOUS EVERY 6 HOURS
Status: DISCONTINUED | OUTPATIENT
Start: 2021-01-01 | End: 2021-01-01

## 2021-01-01 RX ORDER — HYDROCODONE BITARTRATE AND ACETAMINOPHEN 5; 325 MG/1; MG/1
1 TABLET ORAL EVERY 6 HOURS PRN
Status: DISCONTINUED | OUTPATIENT
Start: 2021-01-01 | End: 2021-01-01

## 2021-01-01 RX ORDER — HYDROMORPHONE HYDROCHLORIDE 1 MG/ML
0.6 INJECTION, SOLUTION INTRAMUSCULAR; INTRAVENOUS; SUBCUTANEOUS EVERY 5 MIN PRN
Status: DISCONTINUED | OUTPATIENT
Start: 2021-01-01 | End: 2021-01-01

## 2021-01-01 RX ORDER — TRAMADOL HYDROCHLORIDE 50 MG/1
100 TABLET ORAL EVERY 6 HOURS PRN
Status: DISCONTINUED | OUTPATIENT
Start: 2021-01-01 | End: 2021-01-01

## 2021-01-01 RX ORDER — ACETAMINOPHEN 325 MG/1
650 TABLET ORAL EVERY 4 HOURS PRN
Status: DISCONTINUED | OUTPATIENT
Start: 2021-01-01 | End: 2021-01-01

## 2021-01-01 RX ORDER — POTASSIUM CHLORIDE 29.8 MG/ML
40 INJECTION INTRAVENOUS ONCE
Status: COMPLETED | OUTPATIENT
Start: 2021-01-01 | End: 2021-01-01

## 2021-01-01 RX ORDER — CHLORHEXIDINE GLUCONATE 4 G/100ML
30 SOLUTION TOPICAL
Status: DISCONTINUED | OUTPATIENT
Start: 2021-01-01 | End: 2021-01-01

## 2021-01-01 RX ORDER — AMIODARONE HYDROCHLORIDE 200 MG/1
200 TABLET ORAL DAILY
Refills: 0 | Status: ON HOLD | COMMUNITY
Start: 2021-01-01 | End: 2021-01-01

## 2021-01-01 RX ORDER — ONDANSETRON 2 MG/ML
4 INJECTION INTRAMUSCULAR; INTRAVENOUS EVERY 6 HOURS PRN
Status: DISCONTINUED | OUTPATIENT
Start: 2021-01-01 | End: 2021-01-01

## 2021-01-01 RX ORDER — POTASSIUM CHLORIDE 20 MEQ/1
40 TABLET, EXTENDED RELEASE ORAL ONCE
Status: COMPLETED | OUTPATIENT
Start: 2021-01-01 | End: 2021-01-01

## 2021-01-01 RX ORDER — AMIODARONE HYDROCHLORIDE 400 MG/1
400 TABLET ORAL 2 TIMES DAILY WITH MEALS
Qty: 35 TABLET | Refills: 11 | Status: SHIPPED | OUTPATIENT
Start: 2021-01-01 | End: 2021-01-01

## 2021-01-01 RX ORDER — MAGNESIUM SULFATE HEPTAHYDRATE 40 MG/ML
2 INJECTION, SOLUTION INTRAVENOUS ONCE
Status: COMPLETED | OUTPATIENT
Start: 2021-01-01 | End: 2021-01-01

## 2021-01-01 RX ORDER — POTASSIUM CHLORIDE 1.5 G/1.77G
40 POWDER, FOR SOLUTION ORAL EVERY 4 HOURS
Status: DISPENSED | OUTPATIENT
Start: 2021-01-01 | End: 2021-01-01

## 2021-01-01 RX ORDER — SPIRONOLACTONE 25 MG/1
25 TABLET ORAL DAILY
Qty: 30 TABLET | Refills: 2 | Status: ON HOLD | OUTPATIENT
Start: 2021-01-01 | End: 2021-01-01

## 2021-01-01 RX ORDER — HEPARIN SODIUM AND DEXTROSE 10000; 5 [USP'U]/100ML; G/100ML
INJECTION INTRAVENOUS CONTINUOUS
Status: DISCONTINUED | OUTPATIENT
Start: 2021-01-01 | End: 2021-01-01

## 2021-01-01 RX ORDER — TORSEMIDE 10 MG/1
TABLET ORAL
Qty: 90 TABLET | Refills: 0 | OUTPATIENT
Start: 2021-01-01

## 2021-01-01 RX ORDER — POLYETHYLENE GLYCOL 3350 17 G/17G
17 POWDER, FOR SOLUTION ORAL DAILY PRN
Refills: 0 | Status: SHIPPED | COMMUNITY
Start: 2021-01-01

## 2021-01-01 RX ORDER — ETOMIDATE 2 MG/ML
INJECTION INTRAVENOUS
Status: COMPLETED
Start: 2021-01-01 | End: 2021-01-01

## 2021-01-01 RX ORDER — FERROUS SULFATE 325(65) MG
325 TABLET ORAL
Qty: 12 TABLET | Refills: 0 | Status: SHIPPED | OUTPATIENT
Start: 2021-01-01 | End: 2021-01-01

## 2021-01-01 RX ORDER — TAMSULOSIN HYDROCHLORIDE 0.4 MG/1
0.4 CAPSULE ORAL DAILY
COMMUNITY
Start: 2020-01-23

## 2021-01-01 RX ORDER — TEMAZEPAM 15 MG/1
15 CAPSULE ORAL NIGHTLY PRN
Status: DISCONTINUED | OUTPATIENT
Start: 2021-01-01 | End: 2021-01-01

## 2021-01-01 RX ORDER — LISINOPRIL 5 MG/1
TABLET ORAL
Qty: 90 TABLET | Refills: 0 | OUTPATIENT
Start: 2021-01-01

## 2021-01-01 RX ORDER — ASPIRIN 300 MG
300 SUPPOSITORY, RECTAL RECTAL ONCE
Status: COMPLETED | OUTPATIENT
Start: 2021-01-01 | End: 2021-01-01

## 2021-01-01 RX ORDER — HYDROCODONE BITARTRATE AND ACETAMINOPHEN 5; 325 MG/1; MG/1
2 TABLET ORAL AS NEEDED
Status: DISCONTINUED | OUTPATIENT
Start: 2021-01-01 | End: 2021-01-01

## 2021-01-01 RX ORDER — PROCHLORPERAZINE EDISYLATE 5 MG/ML
5 INJECTION INTRAMUSCULAR; INTRAVENOUS ONCE AS NEEDED
Status: ACTIVE | OUTPATIENT
Start: 2021-01-01 | End: 2021-01-01

## 2021-01-01 RX ORDER — FUROSEMIDE 10 MG/ML
40 INJECTION INTRAMUSCULAR; INTRAVENOUS ONCE
Status: COMPLETED | OUTPATIENT
Start: 2021-01-01 | End: 2021-01-01

## 2021-01-01 RX ORDER — METOPROLOL SUCCINATE 100 MG/1
100 TABLET, EXTENDED RELEASE ORAL
Qty: 30 TABLET | Refills: 0 | Status: SHIPPED | OUTPATIENT
Start: 2021-01-01 | End: 2021-01-01

## 2021-01-01 RX ORDER — DOPAMINE HYDROCHLORIDE 320 MG/100ML
INJECTION, SOLUTION INTRAVENOUS CONTINUOUS
Status: DISCONTINUED | OUTPATIENT
Start: 2021-01-01 | End: 2021-01-01

## 2021-01-01 RX ORDER — ENALAPRIL MALEATE 2.5 MG/1
2.5 TABLET ORAL DAILY
Refills: 0 | Status: SHIPPED | COMMUNITY
Start: 2021-01-01

## 2021-01-01 RX ORDER — ENALAPRIL MALEATE 5 MG/1
5 TABLET ORAL 2 TIMES DAILY
Qty: 60 TABLET | Refills: 2 | Status: SHIPPED | OUTPATIENT
Start: 2021-01-01 | End: 2021-01-01

## 2021-01-01 RX ORDER — POTASSIUM CHLORIDE 1.5 G/1.77G
40 POWDER, FOR SOLUTION ORAL ONCE
Status: COMPLETED | OUTPATIENT
Start: 2021-01-01 | End: 2021-01-01

## 2021-01-01 RX ORDER — MIDAZOLAM HYDROCHLORIDE 1 MG/ML
INJECTION INTRAMUSCULAR; INTRAVENOUS
Status: COMPLETED
Start: 2021-01-01 | End: 2021-01-01

## 2021-01-01 RX ORDER — MELATONIN
6 NIGHTLY
COMMUNITY
End: 2021-01-01 | Stop reason: CLARIF

## 2021-01-01 RX ORDER — MIDAZOLAM HYDROCHLORIDE 10 MG/2ML
INJECTION, SOLUTION INTRAMUSCULAR; INTRAVENOUS
Status: COMPLETED
Start: 2021-01-01 | End: 2021-01-01

## 2021-01-01 RX ORDER — TAMSULOSIN HYDROCHLORIDE 0.4 MG/1
0.4 CAPSULE ORAL DAILY
COMMUNITY
End: 2021-01-01

## 2021-01-01 RX ORDER — DOBUTAMINE HYDROCHLORIDE 200 MG/100ML
5 INJECTION INTRAVENOUS CONTINUOUS
Status: DISCONTINUED | OUTPATIENT
Start: 2021-01-01 | End: 2021-01-01

## 2021-01-01 RX ORDER — MORPHINE SULFATE 10 MG/ML
6 INJECTION, SOLUTION INTRAMUSCULAR; INTRAVENOUS EVERY 10 MIN PRN
Status: DISCONTINUED | OUTPATIENT
Start: 2021-01-01 | End: 2021-01-01

## 2021-01-01 RX ORDER — AMLODIPINE BESYLATE 10 MG/1
10 TABLET ORAL DAILY
Status: ON HOLD | COMMUNITY
End: 2021-01-01

## 2021-01-01 RX ORDER — DEXTROSE MONOHYDRATE 50 MG/ML
INJECTION, SOLUTION INTRAVENOUS CONTINUOUS
Status: DISCONTINUED | OUTPATIENT
Start: 2021-01-01 | End: 2021-01-01

## 2021-01-01 RX ORDER — LIDOCAINE HYDROCHLORIDE 20 MG/ML
INJECTION, SOLUTION EPIDURAL; INFILTRATION; INTRACAUDAL; PERINEURAL
Status: COMPLETED
Start: 2021-01-01 | End: 2021-01-01

## 2021-01-01 RX ORDER — LIDOCAINE HYDROCHLORIDE AND EPINEPHRINE 10; 10 MG/ML; UG/ML
INJECTION, SOLUTION INFILTRATION; PERINEURAL
Status: COMPLETED
Start: 2021-01-01 | End: 2021-01-01

## 2021-01-01 RX ORDER — CEFAZOLIN SODIUM/WATER 2 G/20 ML
SYRINGE (ML) INTRAVENOUS AS NEEDED
Status: DISCONTINUED | OUTPATIENT
Start: 2021-01-01 | End: 2021-01-01 | Stop reason: SURG

## 2021-01-01 RX ORDER — HEPARIN SODIUM 1000 [USP'U]/ML
80 INJECTION, SOLUTION INTRAVENOUS; SUBCUTANEOUS ONCE
Status: COMPLETED | OUTPATIENT
Start: 2021-01-01 | End: 2021-01-01

## 2021-01-01 RX ORDER — TAMSULOSIN HYDROCHLORIDE 0.4 MG/1
0.4 CAPSULE ORAL DAILY
Status: DISCONTINUED | OUTPATIENT
Start: 2021-01-01 | End: 2021-01-01

## 2021-01-01 RX ORDER — POLYETHYLENE GLYCOL 3350 17 G/17G
17 POWDER, FOR SOLUTION ORAL DAILY PRN
Status: DISCONTINUED | OUTPATIENT
Start: 2021-01-01 | End: 2021-01-01

## 2021-01-01 RX ORDER — ENALAPRIL MALEATE 5 MG/1
5 TABLET ORAL DAILY
Refills: 0 | Status: SHIPPED | COMMUNITY
Start: 2021-01-01 | End: 2021-01-01

## 2021-01-01 RX ORDER — DOCUSATE SODIUM 100 MG/1
100 CAPSULE, LIQUID FILLED ORAL 2 TIMES DAILY
Status: DISCONTINUED | OUTPATIENT
Start: 2021-01-01 | End: 2021-01-01

## 2021-01-01 RX ORDER — MORPHINE SULFATE 4 MG/ML
4 INJECTION, SOLUTION INTRAMUSCULAR; INTRAVENOUS EVERY 10 MIN PRN
Status: DISCONTINUED | OUTPATIENT
Start: 2021-01-01 | End: 2021-01-01

## 2021-01-01 RX ORDER — POTASSIUM CHLORIDE 14.9 MG/ML
20 INJECTION INTRAVENOUS ONCE
Status: COMPLETED | OUTPATIENT
Start: 2021-01-01 | End: 2021-01-01

## 2021-01-01 RX ORDER — ACETAMINOPHEN 325 MG/1
650 TABLET ORAL EVERY 4 HOURS PRN
Refills: 0 | Status: SHIPPED | COMMUNITY
Start: 2021-01-01 | End: 2021-01-01

## 2021-01-01 RX ORDER — DILTIAZEM HYDROCHLORIDE EXTENDED-RELEASE TABLETS 180 MG/1
180 TABLET, EXTENDED RELEASE ORAL DAILY
Status: ON HOLD | COMMUNITY
End: 2021-01-01

## 2021-01-01 RX ORDER — AMIODARONE HYDROCHLORIDE 200 MG/1
200 TABLET ORAL DAILY
Status: DISCONTINUED | OUTPATIENT
Start: 2021-01-01 | End: 2021-01-01

## 2021-01-01 RX ORDER — HYDRALAZINE HYDROCHLORIDE 20 MG/ML
10 INJECTION INTRAMUSCULAR; INTRAVENOUS EVERY 4 HOURS PRN
Status: DISCONTINUED | OUTPATIENT
Start: 2021-01-01 | End: 2021-01-01

## 2021-01-01 RX ORDER — HYDROCODONE BITARTRATE AND ACETAMINOPHEN 5; 325 MG/1; MG/1
1 TABLET ORAL EVERY 6 HOURS PRN
Qty: 30 TABLET | Refills: 0 | Status: SHIPPED | OUTPATIENT
Start: 2021-01-01

## 2021-01-01 RX ORDER — ENALAPRIL MALEATE 5 MG/1
2.5 TABLET ORAL DAILY
Status: DISCONTINUED | OUTPATIENT
Start: 2021-01-01 | End: 2021-01-01

## 2021-01-01 RX ORDER — METOPROLOL SUCCINATE 25 MG/1
TABLET, EXTENDED RELEASE ORAL
Status: ON HOLD | COMMUNITY
Start: 2021-01-01 | End: 2021-01-01

## 2021-01-01 RX ORDER — AMIODARONE HYDROCHLORIDE 200 MG/1
400 TABLET ORAL 2 TIMES DAILY WITH MEALS
Status: DISCONTINUED | OUTPATIENT
Start: 2021-01-01 | End: 2021-01-01

## 2021-01-01 RX ORDER — MIDAZOLAM HYDROCHLORIDE 1 MG/ML
2 INJECTION INTRAMUSCULAR; INTRAVENOUS
Status: DISCONTINUED | OUTPATIENT
Start: 2021-01-01 | End: 2021-01-01

## 2021-01-01 RX ORDER — ENALAPRIL MALEATE 10 MG/1
10 TABLET ORAL 2 TIMES DAILY
Status: DISCONTINUED | OUTPATIENT
Start: 2021-01-01 | End: 2021-01-01

## 2021-01-01 RX ORDER — POTASSIUM CHLORIDE 20 MEQ/1
40 TABLET, EXTENDED RELEASE ORAL ONCE
Status: DISCONTINUED | OUTPATIENT
Start: 2021-01-01 | End: 2021-01-01

## 2021-01-01 RX ORDER — DILTIAZEM HYDROCHLORIDE 180 MG/1
180 CAPSULE, EXTENDED RELEASE ORAL DAILY
Status: DISCONTINUED | OUTPATIENT
Start: 2021-01-01 | End: 2021-01-01

## 2021-01-01 RX ORDER — METOPROLOL TARTRATE 50 MG/1
50 TABLET, FILM COATED ORAL
Status: DISCONTINUED | OUTPATIENT
Start: 2021-01-01 | End: 2021-01-01

## 2021-01-01 RX ORDER — HYDROMORPHONE HYDROCHLORIDE 1 MG/ML
0.4 INJECTION, SOLUTION INTRAMUSCULAR; INTRAVENOUS; SUBCUTANEOUS EVERY 5 MIN PRN
Status: DISCONTINUED | OUTPATIENT
Start: 2021-01-01 | End: 2021-01-01

## 2021-01-01 RX ORDER — ASPIRIN 81 MG/1
81 TABLET, CHEWABLE ORAL DAILY
Qty: 30 TABLET | Refills: 0 | Status: SHIPPED | OUTPATIENT
Start: 2021-01-01

## 2021-01-01 RX ORDER — ENALAPRIL MALEATE 5 MG/1
5 TABLET ORAL DAILY
Status: DISCONTINUED | OUTPATIENT
Start: 2021-01-01 | End: 2021-01-01

## 2021-01-01 RX ORDER — ATORVASTATIN CALCIUM 20 MG/1
20 TABLET, FILM COATED ORAL NIGHTLY
COMMUNITY
End: 2021-01-01

## 2021-01-01 RX ORDER — HYDROMORPHONE HYDROCHLORIDE 1 MG/ML
0.2 INJECTION, SOLUTION INTRAMUSCULAR; INTRAVENOUS; SUBCUTANEOUS EVERY 5 MIN PRN
Status: DISCONTINUED | OUTPATIENT
Start: 2021-01-01 | End: 2021-01-01

## 2021-01-01 RX ORDER — AMIODARONE HYDROCHLORIDE 200 MG/1
200 TABLET ORAL
COMMUNITY
Start: 2021-01-01 | End: 2021-01-01

## 2021-01-01 RX ORDER — METOPROLOL TARTRATE 50 MG/1
50 TABLET, FILM COATED ORAL
Refills: 0 | Status: SHIPPED | COMMUNITY
Start: 2021-01-01

## 2021-01-01 RX ORDER — HEPARIN SODIUM AND DEXTROSE 10000; 5 [USP'U]/100ML; G/100ML
12 INJECTION INTRAVENOUS ONCE
Status: COMPLETED | OUTPATIENT
Start: 2021-01-01 | End: 2021-01-01

## 2021-01-01 RX ORDER — FUROSEMIDE 10 MG/ML
40 INJECTION INTRAMUSCULAR; INTRAVENOUS
Status: DISCONTINUED | OUTPATIENT
Start: 2021-01-01 | End: 2021-01-01

## 2021-01-01 RX ORDER — LIDOCAINE HYDROCHLORIDE 10 MG/ML
INJECTION, SOLUTION EPIDURAL; INFILTRATION; INTRACAUDAL; PERINEURAL AS NEEDED
Status: DISCONTINUED | OUTPATIENT
Start: 2021-01-01 | End: 2021-01-01 | Stop reason: SURG

## 2021-01-01 RX ORDER — DIPHENHYDRAMINE HYDROCHLORIDE 50 MG/ML
INJECTION INTRAMUSCULAR; INTRAVENOUS
Status: COMPLETED
Start: 2021-01-01 | End: 2021-01-01

## 2021-01-01 RX ORDER — HEPARIN SODIUM AND DEXTROSE 10000; 5 [USP'U]/100ML; G/100ML
18 INJECTION INTRAVENOUS ONCE
Status: COMPLETED | OUTPATIENT
Start: 2021-01-01 | End: 2021-01-01

## 2021-01-01 RX ORDER — ENALAPRILAT 2.5 MG/2ML
1.25 INJECTION INTRAVENOUS EVERY 6 HOURS
Status: DISCONTINUED | OUTPATIENT
Start: 2021-01-01 | End: 2021-01-01

## 2021-01-01 RX ORDER — SODIUM CHLORIDE, SODIUM LACTATE, POTASSIUM CHLORIDE, CALCIUM CHLORIDE 600; 310; 30; 20 MG/100ML; MG/100ML; MG/100ML; MG/100ML
INJECTION, SOLUTION INTRAVENOUS CONTINUOUS PRN
Status: DISCONTINUED | OUTPATIENT
Start: 2021-01-01 | End: 2021-01-01 | Stop reason: SURG

## 2021-01-01 RX ORDER — SODIUM CHLORIDE 9 MG/ML
INJECTION, SOLUTION INTRAVENOUS
Status: COMPLETED | OUTPATIENT
Start: 2021-01-01 | End: 2021-01-01

## 2021-01-01 RX ORDER — ACETAMINOPHEN 325 MG/1
650 TABLET ORAL EVERY 6 HOURS PRN
COMMUNITY

## 2021-01-01 RX ORDER — TAMSULOSIN HYDROCHLORIDE 0.4 MG/1
0.4 CAPSULE ORAL
Status: DISCONTINUED | OUTPATIENT
Start: 2021-01-01 | End: 2021-01-01

## 2021-01-01 RX ORDER — ASPIRIN 81 MG/1
81 TABLET, CHEWABLE ORAL DAILY
Qty: 30 TABLET | Refills: 0 | Status: SHIPPED | OUTPATIENT
Start: 2021-01-01 | End: 2021-01-01

## 2021-01-01 RX ORDER — ENALAPRIL MALEATE 2.5 MG/1
2.5 TABLET ORAL DAILY
Status: DISCONTINUED | OUTPATIENT
Start: 2021-01-01 | End: 2021-01-01

## 2021-01-01 RX ORDER — ENALAPRIL MALEATE 10 MG/1
10 TABLET ORAL 2 TIMES DAILY
Qty: 60 TABLET | Refills: 2 | Status: ON HOLD | OUTPATIENT
Start: 2021-01-01 | End: 2021-01-01

## 2021-01-01 RX ORDER — CHLORHEXIDINE GLUCONATE 0.12 MG/ML
15 RINSE ORAL
Status: DISCONTINUED | OUTPATIENT
Start: 2021-01-01 | End: 2021-01-01

## 2021-01-01 RX ORDER — DEXTROSE AND SODIUM CHLORIDE 5; .45 G/100ML; G/100ML
INJECTION, SOLUTION INTRAVENOUS CONTINUOUS
Status: DISCONTINUED | OUTPATIENT
Start: 2021-01-01 | End: 2021-01-01

## 2021-01-01 RX ORDER — ENALAPRIL MALEATE 5 MG/1
10 TABLET ORAL 2 TIMES DAILY
Status: DISCONTINUED | OUTPATIENT
Start: 2021-01-01 | End: 2021-01-01

## 2021-01-01 RX ORDER — ENOXAPARIN SODIUM 100 MG/ML
40 INJECTION SUBCUTANEOUS DAILY
Status: DISCONTINUED | OUTPATIENT
Start: 2021-01-01 | End: 2021-01-01

## 2021-01-01 RX ORDER — TRAMADOL HYDROCHLORIDE 50 MG/1
50 TABLET ORAL EVERY 12 HOURS PRN
Status: DISCONTINUED | OUTPATIENT
Start: 2021-01-01 | End: 2021-01-01

## 2021-01-01 RX ORDER — DILTIAZEM HYDROCHLORIDE 5 MG/ML
10 INJECTION INTRAVENOUS ONCE
Status: COMPLETED | OUTPATIENT
Start: 2021-01-01 | End: 2021-01-01

## 2021-01-01 RX ORDER — DILTIAZEM HYDROCHLORIDE 5 MG/ML
INJECTION INTRAVENOUS
Status: COMPLETED
Start: 2021-01-01 | End: 2021-01-01

## 2021-01-01 RX ORDER — LISINOPRIL 2.5 MG/1
2.5 TABLET ORAL DAILY
Status: DISCONTINUED | OUTPATIENT
Start: 2021-01-01 | End: 2021-01-01

## 2021-01-01 RX ORDER — POTASSIUM CHLORIDE 1.5 G/1.77G
40 POWDER, FOR SOLUTION ORAL EVERY 4 HOURS
Status: COMPLETED | OUTPATIENT
Start: 2021-01-01 | End: 2021-01-01

## 2021-01-01 RX ORDER — HALOPERIDOL 5 MG/ML
0.25 INJECTION INTRAMUSCULAR ONCE AS NEEDED
Status: ACTIVE | OUTPATIENT
Start: 2021-01-01 | End: 2021-01-01

## 2021-01-01 RX ORDER — SODIUM CHLORIDE 9 MG/ML
INJECTION, SOLUTION INTRAVENOUS CONTINUOUS
Status: CANCELLED | OUTPATIENT
Start: 2021-01-01

## 2021-01-01 RX ORDER — CEFAZOLIN SODIUM/WATER 2 G/20 ML
2 SYRINGE (ML) INTRAVENOUS EVERY 8 HOURS
Status: COMPLETED | OUTPATIENT
Start: 2021-01-01 | End: 2021-01-01

## 2021-01-01 RX ORDER — METOPROLOL SUCCINATE 50 MG/1
50 TABLET, EXTENDED RELEASE ORAL
Qty: 60 TABLET | Refills: 2 | Status: ON HOLD | OUTPATIENT
Start: 2021-01-01 | End: 2021-01-01

## 2021-01-01 RX ORDER — KETAMINE HYDROCHLORIDE 50 MG/ML
INJECTION, SOLUTION, CONCENTRATE INTRAMUSCULAR; INTRAVENOUS AS NEEDED
Status: DISCONTINUED | OUTPATIENT
Start: 2021-01-01 | End: 2021-01-01 | Stop reason: SURG

## 2021-01-01 RX ORDER — ACETAMINOPHEN 500 MG
1000 TABLET ORAL ONCE
Status: COMPLETED | OUTPATIENT
Start: 2021-01-01 | End: 2021-01-01

## 2021-01-01 RX ORDER — ENALAPRIL MALEATE 5 MG/1
5 TABLET ORAL 2 TIMES DAILY
Status: DISCONTINUED | OUTPATIENT
Start: 2021-01-01 | End: 2021-01-01

## 2021-01-01 RX ORDER — CLOPIDOGREL BISULFATE 75 MG/1
75 TABLET ORAL DAILY
Status: ON HOLD | COMMUNITY
End: 2021-01-01

## 2021-01-01 RX ORDER — MORPHINE SULFATE 2 MG/ML
2 INJECTION, SOLUTION INTRAMUSCULAR; INTRAVENOUS EVERY 10 MIN PRN
Status: DISCONTINUED | OUTPATIENT
Start: 2021-01-01 | End: 2021-01-01

## 2021-01-01 RX ORDER — HYDROCODONE BITARTRATE AND ACETAMINOPHEN 5; 325 MG/1; MG/1
1 TABLET ORAL AS NEEDED
Status: DISCONTINUED | OUTPATIENT
Start: 2021-01-01 | End: 2021-01-01

## 2021-01-01 RX ORDER — TORSEMIDE 10 MG/1
10 TABLET ORAL DAILY
Qty: 30 TABLET | Refills: 0 | Status: SHIPPED | OUTPATIENT
Start: 2021-01-01 | End: 2021-01-01

## 2021-01-01 RX ADMIN — CEFAZOLIN SODIUM/WATER 2 G: 2 G/20 ML SYRINGE (ML) INTRAVENOUS at 16:36:00

## 2021-01-01 RX ADMIN — SODIUM CHLORIDE, SODIUM LACTATE, POTASSIUM CHLORIDE, CALCIUM CHLORIDE: 600; 310; 30; 20 INJECTION, SOLUTION INTRAVENOUS at 16:24:00

## 2021-01-01 RX ADMIN — SODIUM CHLORIDE, SODIUM LACTATE, POTASSIUM CHLORIDE, CALCIUM CHLORIDE: 600; 310; 30; 20 INJECTION, SOLUTION INTRAVENOUS at 11:23:00

## 2021-01-01 RX ADMIN — KETAMINE HYDROCHLORIDE 10 MG: 50 INJECTION, SOLUTION, CONCENTRATE INTRAMUSCULAR; INTRAVENOUS at 11:33:00

## 2021-01-01 RX ADMIN — LIDOCAINE HYDROCHLORIDE 50 MG: 10 INJECTION, SOLUTION EPIDURAL; INFILTRATION; INTRACAUDAL; PERINEURAL at 16:32:00

## 2021-01-01 NOTE — RESPIRATORY THERAPY NOTE
Patient received from paramedics ambu bagged. Successfully intubated ETT 7.0 @ 22 cm. Placed on A/C 15/500/+5/100%. ABG drawn showing combined acidosis. Transported to CT and back without incident and from ED to  without incident.  Able to wean to 80

## 2021-01-01 NOTE — CONSULTS
[facility]    PATIENT'S NAME: Timur Roberts   ATTENDING PHYSICIAN: Regina Galvin MD   CONSULTING PHYSICIAN: Neva Sarmiento.  Radha Garcia MD   PATIENT ACCOUNT#:   [id number]    LOCATION:  [place] [address]  MEDICAL RECORD #:   [id number]       DATE OF BIRTH: bruits. LUNGS:  Clear anterolaterally. HEART:  S1, S2 are normal.  There is no pathologic murmur, no third heart sound, no rub or click. ABDOMEN:  Soft with no masses, organomegaly, bruits, or pulsations.   EXTREMITIES:  Feet are cool but legs are otherw

## 2021-01-01 NOTE — ED INITIAL ASSESSMENT (HPI)
Per EMS pt was at the extended stay hotel in [address] was in the lobby, witnessed cardiac arrest by staff. Police arrived and shocked pt twice before EMS arrived. When EMS arrived pt was noted to be in [facility] and a 3rd shock was administered.  Then CPR t

## 2021-01-01 NOTE — ED PROVIDER NOTES
Patient Seen in: [facility] Emergency Department      History   Patient presents with:  Cardiac Arrest    Stated Complaint:     HPI/Subjective:   HPI  Unknown aged male with unknown past medical history brought in by EMS after cardiac arrest with sounds: No wheezing. Comments: Sonorous respirations  Abdominal:      Palpations: Abdomen is soft.    Musculoskeletal:      Comments: Left IO in place   Skin:     Comments: Cool, mottled   Neurological:      Comments: No gag reflex, does not withdraw e sounds over the epigastrium. Initial ventilator management conducted by me. No complications. Post procedure CXR shows ETT in place.     PROCEDURE:  Central Line Placement  :  Keily Lira  Indication:  Post arrest    The patient / caregivers w Calculated Osmolality 306 (H) 275 - 295 mOsm/kg    GFR, Non- 38 (L) >=60    GFR, -American 44 (L) >=60   CBC W/ DIFFERENTIAL    Collection Time: 01/01/21 11:02 AM   Result Value Ref Range    WBC 5.7 4.0 - 11.0 x10(3) uL    RBC 4.45 3 8.0    Protein Urine 100  (A) Negative mg/dL    Urobilinogen Urine <2.0 <2.0    Nitrite Urine Negative Negative    Leukocyte Esterase Urine Negative Negative    WBC Urine 3 0 - 5 /HPF    RBC URINE 3 (H) 0 - 2 /HPF    Bacteria Urine Few (A) None Seen /HPF reviewed the labs and imaging and found XR with ETT in place, lactic elevated, rapid covid negative, troponin negative, no leukocytosis, no anemia, bicarb 17, electrolytes otherwise reassuring  - pt intubated on arrival for airway protection - requiring mi unspecified type [facility])  Ventricular fibrillation ([facility] [address]    Disposition:  Admit  1/1/2021  1:08 pm    Follow-up:  No follow-up provider specified.   We recommend that you schedule follow up care with a primary care provider within the next three months to obt

## 2021-01-01 NOTE — CONSULTS
Cardiology (consult dictated)    Assessment:  1. Out of hospital cardiac arrest.. Rhythm was V. fib initially. Has been converted to rapid A. fib. There is no acute ischemic change on the EKG. 2.  Previous history of SVT, status post SVT ablation.

## 2021-01-02 NOTE — H&P
Pulmonary/Critical Care Admission Note    HPI:   Sherri Frey is a 67year old male with Patient presents with:  Violeta Armendariz MD    Pt is a 68 yo with h/o HTN, Afib, SVT s/p ablation, VT and unk other med problems who colla Intravenous, Q4H PRN    •  Midazolam HCl (VERSED) 5 MG/5ML injection 2 mg, 2 mg, Intravenous, Q1H PRN    •  fentaNYL citrate (SUBLIMAZE) 0.05 MG/ML injection 25 mcg, 25 mcg, Intravenous, Q1H PRN    •  [COMPLETED] norepinephrine (LEVOPHED) 4 mg/250 ml premi TROP <0.045 01/01/2021         CXR RUL consolidation     ASSESSMENT/PLAN:     Out of hospital cardiac arrest  Etiology not clear  No clear ischemia  Started therapeutic hypothermia  Downtime est 15 minutes  Possible PNA  Not on pressors but pressure low

## 2021-01-02 NOTE — RESPIRATORY THERAPY NOTE
Received patient intubated and mechanically ventilated on the following vent settings: AC/VC 15, 500, +5, 35% with a size 7.0 ETT secure 22 at the lip. B/L BS auscultated and patient suctioned as needed. RT will continue to monitor the patient.      Patient

## 2021-01-02 NOTE — PLAN OF CARE
Bilateral soft wrist restraints remain in place and are being monitored and documented on.      Problem: Safety Risk - Non-Violent Restraints  Goal: Patient will remain free from self-harm  Description: INTERVENTIONS:  - Apply the least restrictive restrain

## 2021-01-02 NOTE — PROGRESS NOTES
[facility]  Progress Note    Ash Hooper Patient Status:  Inpatient    3/26/1945 MRN [id number]   Location [facility] 2W/SW Attending Jes Herrera MD   Hosp Day # 1 PCP Julieth Stout MD     Assessment:    1.   Out of hos Sedated. On ventilator. HEENT: No focal deficits. Pupils pinpoint and equal.  Neck: No JVD, carotids 2+ no bruits. Cardiac: Regular rate and rhythm, S1, S2 normal, no murmur  Lungs: Clear anterolaterally without wheezes, rales, rhonchi.   Abdomen: Soft

## 2021-01-02 NOTE — PROGRESS NOTES
[facility]     Progress Note        Madison Charter Patient Status:  Inpatient    3/26/1945 MRN [id number]   Location [facility] 2W/SW Attending Sheila Ruiz MD   Hosp Day # 1 PCP Deondre Villanueva MD       Subjective: Intravenous, QAM AC    •  DOPamine in D5W (INTROPIN) 800 mg/250 ml premix infusion, 1.5-20 mcg/kg/min (Dosing Weight), Intravenous, Continuous    •  Piperacillin Sod-Tazobactam So (ZOSYN) 3.375 g in dextrose 5 % 100 mL ADD-vantage, 3.375 g, Intravenous, Q8 underlying pulmonary mass. Right upper paratracheal stripe fullness is suggestive of underlying vascularity. This can also be further assessed on subsequent nonemergent chest CT to exclude underlying mass or adenopathy.   Mild interstitial edema, unchange undergoing hypothermia protocol. Patient to be rewarmed later this afternoon.  -Initially CT head had been ordered and presumption that it was completed by CT head not performed. Will order CT head to evaluate for any possible intracranial bleeding.   If

## 2021-01-02 NOTE — PROGRESS NOTES
[facility] Dosing Service  Antibiotic Dosing    Barb Crew is a 67year old for whom pharmacy is dosing Zosyn for treatment of pneumonia. Allergies: is allergic to other.     Vitals: BP 98/57 (BP Location: Right arm)   Pulse (!) 40   Temp (!) 91.4

## 2021-01-02 NOTE — PLAN OF CARE
Note: Lawrence Garcia remains sedated and has limited neurological response at this time. Pupils are reactive and gag/cough reflexes intact consistently. Response to pain varies. Propofol infusing at 45 mcg/kg/min for sedation.  IV push fentanyl and midazolam a we care for you?  I live alone  - Provide timely, complete, and accurate information to patient/family  - Incorporate patient and family knowledge, values, beliefs, and cultural backgrounds into the planning and delivery of care  - Encourage patient/family ventilation and oxygenation  Description: INTERVENTIONS:  - Assess for changes in respiratory status  - Assess for changes in mentation and behavior  - Position to facilitate oxygenation and minimize respiratory effort  - Oxygen supplementation based on ox Free from bleeding injury  Description: (Example usage: patient with low platelets)  INTERVENTIONS:  - Avoid intramuscular injections, enemas and rectal medication administration  - Ensure safe mobilization of patient  - Hold pressure on venipuncture sites ordered parameters to optimize cerebral perfusion and minimize risk of hemorrhage  - Monitor temperature, glucose, and sodium.  Initiate appropriate interventions as ordered  Outcome: Not Progressing

## 2021-01-02 NOTE — PLAN OF CARE
Per brother Chantal Alamo), family wishes pt to be DNAR. Dr. Victor M South notified. Fentanyl/versed given prn for shivering. OG to LIS with brown output. Dr. Victor M South aware, will continue to monitor. Dopamine turned off. Heparin gtt started. Rewarming began at 16:00. edema, trend weights  Outcome: Progressing  Goal: Absence of cardiac arrhythmias or at baseline  Description: INTERVENTIONS:  - Continuous cardiac monitoring, monitor vital signs, obtain 12 lead EKG if indicated  - Evaluate effectiveness of antiarrhythmic activity  - Obtain nutritional consult as needed  - Establish a toileting routine/schedule  - Consider collaborating with pharmacy to review patient's medication profile  Outcome: Progressing     Problem: SKIN/TISSUE INTEGRITY - ADULT  Goal: Skin integrity mobilization  - Obtain PT/OT consults as needed  - Advance activity as appropriate  - Communicate ordered activity level and limitations with patient/family  Outcome: Progressing     Problem: NEUROLOGICAL - ADULT  Goal: Achieves stable or improved neurolog

## 2021-01-02 NOTE — PLAN OF CARE
Problem: Patient Centered Care  Goal: Patient preferences are identified and integrated in the patient's plan of care  Description: Interventions:  - What would you like us to know as we care for you?  I live alone  - Provide timely, complete, and accu hematologic stability  Description: INTERVENTIONS  - Assess for signs and symptoms of bleeding or hemorrhage  - Monitor labs and vital signs for trends  - Administer supportive blood products/factors, fluids and medications as ordered and appropriate  - Ad - ex. Angina  - Evaluate fluid balance, assess for edema, trend weights  Outcome: Not Progressing     Problem: RESPIRATORY - ADULT  Goal: Achieves optimal ventilation and oxygenation  Description: INTERVENTIONS:  - Assess for changes in respiratory status

## 2021-01-03 NOTE — PROGRESS NOTES
[facility]    Progress Note    Marianna Pacheco Patient Status:  Inpatient    3/26/1945 MRN [id number]   Location [facility] 2W/SW Attending India Peters MD   Hosp Day # 2 PCP Chaya Robison MD        Subjective: better and tolerate SBT    3. Acute kidney injury  Better     4.   Anoxic encephalopathy  Status post hypothermic protocol  Getting rewarmed  Off propofol he opened his eyes and moved extremity and follow some commands  Continue full support for now   CT h paratracheal stripe fullness is suggestive of underlying vascularity. This can also be further assessed on subsequent nonemergent chest CT to exclude underlying mass or adenopathy. Mild interstitial edema, unchanged.      Dictated by (CST): RADHA Melgoza

## 2021-01-03 NOTE — PLAN OF CARE
Soft wrist restraints remain in place and are being monitored and documented on.      Problem: Safety Risk - Non-Violent Restraints  Goal: Patient will remain free from self-harm  Description: INTERVENTIONS:  - Apply the least restrictive restraint to

## 2021-01-03 NOTE — PLAN OF CARE
Note: Frankie Lee remains sedated. Frequent neurological assessments performed. He remained unresponsive to painful stimulus until 0100 (T 34.9C), when I first observed facial grimace to painful stimulus by squeezing the patient's trapezius muscle.  Painful by Belén Elzbieta, RN  Outcome: Progressing     Problem: Patient Centered Care  Goal: Patient preferences are identified and integrated in the patient's plan of care  Description: Interventions:  - What would you like us to know as we care for you?  I live medications as ordered  - Initiate emergency measures for life threatening arrhythmias  - Monitor electrolytes and administer replacement therapy as ordered  Outcome: Progressing     Problem: RESPIRATORY - ADULT  Goal: Achieves optimal ventilation and oxyg Monitor labs and vital signs for trends  - Administer supportive blood products/factors, fluids and medications as ordered and appropriate  - Administer supportive blood products/factors as ordered and appropriate  Outcome: Progressing  Goal: Free from ble stability and activity tolerance for standing, transferring and ambulating w/ or w/o assistive devices  - Assist with transfers and ambulation using safe patient handling equipment as needed  - Ensure adequate protection for wounds/incisions during mobiliz

## 2021-01-03 NOTE — PLAN OF CARE
Restraints remain in place and are being monitored and documented on.    Problem: Safety Risk - Non-Violent Restraints  Goal: Patient will remain free from self-harm  Description: INTERVENTIONS:  - Apply the least restrictive restraint to prevent harm  - No

## 2021-01-03 NOTE — PLAN OF CARE
Chanda Fu remains in bilateral soft wrist restraints for safety to prevent discontinuation of medical equipment. Assessed Q2hrs for comfort and repositioned.     Problem: Safety Risk - Non-Violent Restraints  Goal: Patient will remain free from self-harm  Desc

## 2021-01-03 NOTE — PROGRESS NOTES
Brief neurology note    Neurology consulted for ? Prognosis after cardiac arrest.  Currently undergoing targeted temperature management. Ideally patient should be euthermic for at least 72 hours before they evaluated by neurology.   Reviewed his head CT,

## 2021-01-04 NOTE — PLAN OF CARE
Bilateral soft wrist restraints remain in place and are being monitored and documented on.      Problem: Safety Risk - Non-Violent Restraints  Goal: Patient will remain free from self-harm  Description: INTERVENTIONS:  - Apply the least restrictive res

## 2021-01-04 NOTE — CM/SW NOTE
MD order for Advanced Directives- pt currently not able to discuss. Will follow up when patient extubated and alert. / to remain available for support and/or discharge planning.      Sulema Urrutia MBA MSN, RN CTL/

## 2021-01-04 NOTE — CONSULTS
[facility]  [address] [facility]  [phone] [address]    Report of Consultation  Marii Hernandez Patient Status:  Inpatient     3/26/1945 MRN [id number] Mr. Lore Gatica is a 67year old man w/ a pmhx sig. for A. fib on anticoagulation, prior left occipital stroke, hypertension, and arrhythmia who suffered a cardiac arrest while at the [facility] extended stay hotel with ROSC achieved while in the lobby.   Target • HTN (hypertension)    • SVT (supraventricular tachycardia) (HCC)     s/p ablation   • VT (ventricular tachycardia) (HCC)        Past Surgical History:   Procedure Laterality Date   • OTHER      ukn       No family history on file.     Social History    So /70 (BP Location: Left arm)   Pulse 85   Temp 98.8 °F (37.1 °C) (Bladder)   Resp 16   Ht 5' 7.99\" (1.727 m)   Wt 166 lb 10.7 oz (75.6 kg)   SpO2 99%   BMI 25.35 kg/m²     Temp (24hrs), Av.8 °F (35.4 °C), Min:92.7 °F (33.7 °C), Max:98.8 °F (37.1 CREATSERUM 0.86 01/03/2021    GLU 73 01/03/2021    CA 8.2 01/03/2021    ALT 65 01/03/2021    AST 75 01/03/2021    ALB 2.6 01/03/2021        Lab Results   Component Value Date    TROP 0.503 ([facility]) 01/02/2021    TROP 0.461 () 01/01/2021    TROP <0.045 01/01 1. Cardiac arrest status post targeted temperature management and history of left occipital stroke            Diagnostics:  1. None. Recommend sedation holidays to evaluate. Sedation to a RASS of -1. Therapeutics:  1.  Continue medical management per ICU

## 2021-01-04 NOTE — DIETARY NOTE
ADULT NUTRITION INITIAL ASSESSMENT    Pt is at high nutrition risk. Pt does not meet malnutrition criteria.         RECOMMENDATIONS TO MD:  See Nutrition Intervention for EN orders     NUTRITION DIAGNOSIS/PROBLEM:  Inadequate protein energy intake related and transfer of nutrition care to new setting or provider: pt living in [facility] PTA (extended)      ANTHROPOMETRICS:  HT: 172.7 cm (5' 7.99\")  WT: 75.6 kg (166 lb 10.7 oz)   BMI: Body mass index is 25.35 kg/m².   BMI CLASSIFICATION: 25-29.9 kg/m2 - overweigh Accumulation: hands per visual exam  - Skin Integrity: intact. MONITOR AND EVALUATE/NUTRITION GOALS:  - Food and Nutrient Intake:      Monitor: for PO initiation in future.   - Food and Nutrient Administration:      Monitor: tolerance to enteral nutritio

## 2021-01-04 NOTE — PLAN OF CARE
Maximiliano Ang remains sedated and intubated. His temperature reached goal of 37 degrees C at 11am, maintaining temperature throughout the day. Sedation vacation performed at 10am, pt opened eyes to speech and followed commands.  HR and BP stable as charted, tachyc perspectives and choices  Outcome: Progressing     Problem: Patient/Family Goals  Goal: Patient/Family Long Term Goal  Description: Patient's Long Term Goal: To go home    Interventions:  - Oxygen therapy  - See additional Care Plan goals for specific inte broncho-pulmonary hygiene including cough, deep breathe, Incentive Spirometry  - Assess the need for suctioning and perform as needed  - Assess and instruct to report SOB or any respiratory difficulty  - Respiratory Therapy support as indicated  - Manage/a Administer supportive blood products/factors as ordered and appropriate  Outcome: Progressing     Problem: MUSCULOSKELETAL - ADULT  Goal: Return mobility to safest level of function  Description: INTERVENTIONS:  - Assess patient stability and activity tole

## 2021-01-04 NOTE — PROGRESS NOTES
[facility]  Progress Note    González Funk Patient Status:  Inpatient    3/26/1945 MRN [id number]   Location [facility] 2W/SW Attending Scotty Thapa MD   Hosp Day # 3 PCP Shaista Diaz MD     Assessment:    1.   Out of hos Peripheral pulses are 2+. Skin: Warm and dry.      Labs:  Lab Results   Component Value Date    WBC 5.1 01/04/2021    HGB 10.6 01/04/2021    HCT 32.3 01/04/2021    .0 01/04/2021    .0 01/04/2021     Lab Results   Component Value Date    INR 1

## 2021-01-04 NOTE — PROGRESS NOTES
Pulmonary/Critical Care Follow Up Note    HPI:   Oscar Deluca is a 67year old male with Patient presents with:  Valentina Sanon      PCP Rolanda Harvey MD  Admission Attending Maria E Avery 15 Day #3    Now wakes up off sedation ml premix infusion, 1.5-20 mcg/kg/min (Dosing Weight), Intravenous, Continuous  •  Piperacillin Sod-Tazobactam So (ZOSYN) 3.375 g in dextrose 5 % 100 mL ADD-vantage, 3.375 g, Intravenous, Q8H      Allergies:     Other                   UNKNOWN    Comment:Un brother who lives in [place]  Another brother in [address] [place] [address] does not think pt would want additional CPR  I asked him to discuss with his brother as well  DNR/Full       D/w with brother Ana Rosa Lanier  Answered all questions       35 min CCT      Jhonatan

## 2021-01-04 NOTE — PAYOR COMM NOTE
Appropriate for inpatient status per guidelines for cardiac arrest.       --------------  ADMISSION REVIEW     PayorCassy Meredith MA [employer]  Subscriber #:  [id number]  Authorization Number: [id number]       ED Provider Notes        Patient Seen in: [facility] Conjunctiva/sclera: Conjunctivae normal.   Neck:      Musculoskeletal: Neck supple. Cardiovascular:      Rate and Rhythm: Tachycardia present. Rhythm irregular. Pulmonary:      Breath sounds: No wheezing.       Comments: Sonorous respirations  Abdominal placed at 22 cm at the lip. Tube placement verified by direct visualization, condensation in the tube, and positive ETCO2. Bilateral breath sounds were present with absence of breath sounds over the epigastrium.   Initial ventilator management conducted b 32.0 mmol/L    Anion Gap 12 0 - 18 mmol/L    BUN 16 7 - 18 mg/dL    Creatinine 1.34 (H) 0.70 - 1.30 mg/dL    BUN/CREA Ratio 11.9 10.0 - 20.0    Calcium, Total 8.7 8.5 - 10.1 mg/dL    Calculated Osmolality 306 (H) 275 - 295 mOsm/kg    GFR, Non-African Ameri 1.035    Glucose Urine 150  (A) Negative mg/dL    Bilirubin Urine Negative Negative    Ketones Urine Negative Negative mg/dL    Blood Urine Small (A) Negative    pH Urine 6.0 5.0 - 8.0    Protein Urine 100  (A) Negative mg/dL    Urobilinogen Urine <2.0 <2. Unknown aged male with cardiac arrest s/p ROSC   - I personally reviewed and interpreted all the ED vitals  - afebrile, hemodynamically stable  - I ordered and personally reviewed the labs and imaging and found XR with ETT in place, lactic elevated, ra with consultants.          Disposition and Plan     Clinical Impression:  Cardiac arrest [facility])  (primary encounter diagnosis)  V-tach [facility])  Atrial fibrillation, unspecified type ([facility]  [facility]    Disposition:  Admit  1/1/2021  1:08 p therapeutic hypothermia  Downtime est 15 minutes  Possible PNA  Not on pressors but pressure lower  Lactic acid has cleared  Plan CCU   Therapeutic hypothermia   ECHO   Pressors as needed   Asa x 1   D/w Dr Nahum Mendez   CT head and spine with results pending hypothermia protocol. Patient to be rewarmed later this afternoon.  -Initially CT head had been ordered and presumption that it was completed by CT head not performed. Will order CT head to evaluate for any possible intracranial bleeding.   If no evidence initiate a heparin drip. (Pending results of CT scan from yesterday, not read yet.)           1/2 [facility]    Neurology consulted for ? Prognosis after cardiac arrest.  Currently undergoing targeted temperature management.   Ideally patient should be eutherm 01/03/2021     HCT 36.3 (L) 01/03/2021     .0 (L) 01/03/2021     .0 (L) 01/03/2021     CREATSERUM 0.86 01/03/2021     BUN 15 01/03/2021      01/03/2021     K 3.8 01/03/2021      01/03/2021     CO2 29.0 01/03/2021     GLU 73 01/03/ RN    1/4/2021 0400 Hi-Risk Rate/Dose Verify 650 Units/hr Intravenous Jonnie Lewis, RN    1/4/2021 0300 Hi-Risk Rate/Dose Verify 650 Units/hr Intravenous Jonnie Lewis, RN    1/4/2021 0200 Hi-Risk Rate/Dose Verify 650 Units/hr Intravenous Jonnie Lewis g     Date Action Dose Route User    1/4/2021 1050 New Bag 2 g Intravenous Selma Simpson, RN      metoprolol Tartrate (LOPRESSOR) injection 5 mg     Date Action Dose Route User    1/4/2021 0021 Given 5 mg Intravenous Sandra Canales RN    1/3/2021 210 1/4/2021 0000 Rate/Dose Verify 35 mcg/kg/min × 75 kg Intravenous Gibbs Fallon, RN    1/3/2021 2300 Rate/Dose Verify 35 mcg/kg/min × 75 kg Intravenous Gibbs Fallon, ALEJANDRINA    1/3/2021 2233 New Bag 35 mcg/kg/min × 75 kg Intravenous Gibbs ALEJANDRINA Lehman    1

## 2021-01-04 NOTE — PLAN OF CARE
Patient remains intubated and sedated. Off sedation this morning. Sedation restarted again. Bilateral soft wrist restraints remain in place d/t pt disoriented and at risk for pulling out lines/tubes. Frequent skin checks done and ROM exercises.      Problem

## 2021-01-04 NOTE — PROGRESS NOTES
[facility]  Progress Note    Gem Solum Patient Status:  Inpatient    3/26/1945 MRN [id number]   Location [facility] 2W/SW Attending Bret Liao MD   Hosp Day # 2 PCP Rex Martinez MD     Assessment:    1.   Out of hos place a small pressure dressing and monitor. Subjective:   Warmed this AM.     Objective:  /70 (BP Location: Left arm)   Pulse 85   Temp 98.8 °F (37.1 °C) (Bladder)   Resp 16   Ht 5' 7.99\" (1.727 m)   Wt 166 lb 10.7 oz (75.6 kg)   SpO2 99% propofol 35 mcg/kg/min (01/03/21 3163)   • norepinephrine Stopped (01/02/21 0230)   • lactated ringers 100 mL/hr at 01/03/21 1449   • DOPamine in D5W Stopped (01/02/21 1420)

## 2021-01-04 NOTE — PLAN OF CARE
Pt remained on following vent settings overnight: VC 15R/500vt/+5/ 30% FiO2. Tolerating well.    Problem: RESPIRATORY - ADULT  Goal: Achieves optimal ventilation and oxygenation  Description: INTERVENTIONS:  - Assess for changes in respiratory status  - Ass

## 2021-01-04 NOTE — PLAN OF CARE
Note: Bishop Antunez remains sedated. He responds to commands with sedation off, spontaneous awakening trial performed at 0500. Bilateral soft wrist restraints remain in place and are being monitored and documented on. Respiratory status is stable.  He remains values, beliefs, and cultural backgrounds into the planning and delivery of care  - Encourage patient/family to participate in care and decision-making at the level they choose  - Honor patient and family perspectives and choices  Outcome: Progressing behavior  - Position to facilitate oxygenation and minimize respiratory effort  - Oxygen supplementation based on oxygen saturation or ABGs  - Provide Smoking Cessation handout, if applicable  - Encourage broncho-pulmonary hygiene including cough, deep yohannes INTERVENTIONS  - Assess for signs and symptoms of bleeding or hemorrhage  - Monitor labs and vital signs for trends  - Administer supportive blood products/factors, fluids and medications as ordered and appropriate  - Administer supportive blood products/f patient/family  Outcome: Not Progressing

## 2021-01-04 NOTE — PROGRESS NOTES
[facility]    Cardiology Progress Note    [facility] Patient Status:  Inpatient    3/26/1945 MRN [id number]   Location [facility] 2W/SW Attending Kylie Rubin MD   [facility] Day # 3 PCP Vamshi Mckeon MD      for the past month, HR in 30s most recent. He was not complaining of CP when the patient spoke to him over the phone.      Objective:   Temp: 98.6 °F (37 °C)  Pulse: 63  Resp: 14  BP: 127/55  FiO2 (%): 30 %    Intake/Output:     Intake/Output Summary (Last and rhythm. S1, S2 normal. No murmur, pericardial rub, S3.  Lungs: Clear without wheezes, rales, rhonchi or dullness. Normal excursions and effort. OT tube  Abdomen: Soft, BS present. Extremities: Without clubbing, cyanosis or edema.   Peripheral pulses a HCl (VERSED) 5 MG/5ML injection 2 mg, 2 mg, Intravenous, Q1H PRN    •  fentaNYL citrate (SUBLIMAZE) 0.05 MG/ML injection 25 mcg, 25 mcg, Intravenous, Q1H PRN    •  norepinephrine (LEVOPHED) 4 mg/250 ml premix infusion, 0.5-30 mcg/min, Intravenous, Continuo

## 2021-01-05 NOTE — TELEPHONE ENCOUNTER
Called brother. Brother's wife (patient's sister in law) Miki Briggs, answered. Shayy Carbone is in the shower at the moment and cannot answer call. Discussed with Roseline Olivarez that unfortunately she is not listed on EC and thus cannot relay information to her.

## 2021-01-05 NOTE — TELEPHONE ENCOUNTER
Upon research:    Duplicate chart is new. Generated upon admission to hospital.  No old records or anything of pertinent information dictating patient's history noted in there.     Under this chart ([id number]):    -No referrals noted from initiating care Chief Complaint from RN:  Palpitations  Stated Complaint from :  svt  Time Seen by MD:  11:48     HPI/ROS  . CHIEF COMPLAINT: Racing heart rate     HISTORY OF PRESENT ILLNESS: Patient states he felt fine yesterday   fine this   morning.  She suddenl Admitted on 3/3/2017 with dizziness with exertion, dyspnea on exertion and fatigue secondary to acute bilateral cerebellar CVA and recurrent paroxysmal atrial fibrillation. He also had some nonsustained VT and sinus pause up to 2.7 seconds.   Patient Amada Foley Pt returned call and stated that he would like to cancel appt since he has new pcp. Pt stated that he will call back if nessesary.          - July 2017: Annual Physical with Dr. Niranjan Hewitt.     - October 2017: Colonoscopy with Dr. Zandra Howell    - December 2017: Barrett Hammans wit

## 2021-01-05 NOTE — CM/SW NOTE
CM received telephone call from pts ALEJANDRINA Awad requesting this CM call pt's brother Steven Nava [phone] regarding POA. CM called and spoke with Pramod via telephone. Per Luis Schaffer pt does not have children. Pt has 2 brothers and 1 sister.   CM clarified Pramod i

## 2021-01-05 NOTE — RESPIRATORY THERAPY NOTE
Pt received on full vent support settings AC 15,500,30% Peep +5. ETT  7.0mm secured at 25 cm at the lips. No weaning done on this shift. RT will continue monitor.

## 2021-01-05 NOTE — PROGRESS NOTES
[facility]     Progress Note        Geoffery Stage Patient Status:  Inpatient    3/26/1945 MRN [id number]   Location [facility] 2W/SW Attending Wing Nena MD   Hosp Day # 4 PCP Stanford Hines MD       Subjective: mg, Intravenous, QAM AC    •  Piperacillin Sod-Tazobactam So (ZOSYN) 3.375 g in dextrose 5 % 100 mL ADD-vantage, 3.375 g, Intravenous, Q8H        Continuous Infusions:   • dextrose     • amiodarone 0.5 mg/min (01/05/21 0858)   • Continuous dose Heparin inf 1/4/2021  CONCLUSION:  1. There is new moderate elevation the right hemidiaphragm when compared to previous study which may suggest right sub pulmonic effusion and or right basal atelectasis. Follow-up study advised.   Follow-up CT scan would help to furth intact ejection fraction seen at the time.           Curly Riedel,   Pulmonary [address] [facility]

## 2021-01-05 NOTE — PLAN OF CARE
Chanda Fu remains sedated on full support. This morning, conducted a SAT and SBT. Was able to follow commands appropriately. Pressure support trial (CPAP) 5/5 conducted, patient did not tolerate. Propofol gtt, amiodarone gtt, and heparin gtt continued.  Kd patient and family perspectives and choices  Outcome: Progressing     Problem: Patient/Family Goals  Goal: Patient/Family Long Term Goal  Description: Patient's Long Term Goal: To go home    Interventions:  - Oxygen therapy  - See additional Care Plan goal applicable  - Encourage broncho-pulmonary hygiene including cough, deep breathe, Incentive Spirometry  - Assess the need for suctioning and perform as needed  - Assess and instruct to report SOB or any respiratory difficulty  - Respiratory Therapy support ordered and appropriate  - Administer supportive blood products/factors as ordered and appropriate  Outcome: Progressing  Goal: Free from bleeding injury  Description: (Example usage: patient with low platelets)  INTERVENTIONS:  - Avoid intramuscular injec

## 2021-01-05 NOTE — PROGRESS NOTES
Patient received on full vent support A/C15 500 30% +5 with 7.0 ETT taped 25 @ the lip. Patient has large amounts of clear/white secretions at times. Patient resting comfortably throughout the shift.

## 2021-01-05 NOTE — PLAN OF CARE
Problem: Safety Risk - Non-Violent Restraints  Goal: Patient will remain free from self-harm  Description: INTERVENTIONS:  - Apply the least restrictive restraint to prevent harm  - Notify patient and family of reasons restraints applied  - Assess for an effectiveness of vasoactive medications to optimize hemodynamic stability  - Monitor arterial and/or venous puncture sites for bleeding and/or hematoma  - Assess quality of pulses, skin color and temperature  - Assess for signs of decreased coronary artery nutritional consult as needed  - Evaluate fluid balance  Outcome: Progressing  Goal: Maintains or returns to baseline bowel function  Description: INTERVENTIONS:  - Assess bowel function  - Maintain adequate hydration with IV or PO as ordered and tolerated mobility to safest level of function  Description: INTERVENTIONS:  - Assess patient stability and activity tolerance for standing, transferring and ambulating w/ or w/o assistive devices  - Assist with transfers and ambulation using safe patient handling e

## 2021-01-05 NOTE — PLAN OF CARE
Problem: Safety Risk - Non-Violent Restraints  Goal: Patient will remain free from self-harm  Description: INTERVENTIONS:  - Apply the least restrictive restraint to prevent harm  - Notify patient and family of reasons restraints applied  - Assess for an cardiac arrhythmias or at baseline  Description: INTERVENTIONS:  - Continuous cardiac monitoring, monitor vital signs, obtain 12 lead EKG if indicated  - Evaluate effectiveness of antiarrhythmic and heart rate control medications as ordered  - Initiate lilia a toileting routine/schedule  - Consider collaborating with pharmacy to review patient's medication profile  Outcome: Progressing     Problem: SKIN/TISSUE INTEGRITY - ADULT  Goal: Skin integrity remains intact  Description: INTERVENTIONS  - Assess and docu activity as appropriate  - Communicate ordered activity level and limitations with patient/family  Outcome: Progressing     Problem: NEUROLOGICAL - ADULT  Goal: Achieves stable or improved neurological status  Description: INTERVENTIONS  - Assess for and r

## 2021-01-05 NOTE — PROGRESS NOTES
[facility] 37  [address]  [phone]        INPATIENT NEUROLOGY   FOLLOW UP PROGRESS NOTE    Date of Admission:  1/1/2021  Date of Consult Follow Up:  1/4/2021  Catarino Orozco Patient Status:   In Exam:  - General: NAD  - CV: symmetric pulses  - Pulmonary: normal excursion of the chest   Neurologic Exam  - Mental Status: Opens eyes to noxious stimuli. Does not regard. When he awakens he begins coughing and breathing against the ventilator.   He d • piperacillin-tazobactam  3.375 g Intravenous Q8H     • dextrose     • amiodarone 0.5 mg/min (01/05/21 0858)   • Continuous dose Heparin infusion 800 Units/hr (01/05/21 0630)   • propofol 30 mcg/kg/min (01/05/21 1997)             Test results/Imaging:

## 2021-01-05 NOTE — PROGRESS NOTES
[facility]    Cardiology Progress Note  [facility]    Ash Hooper Patient Status:  Inpatient    3/26/1945 MRN [id number]   Location [facility] 2W/SW Attending Jes Herrera MD   [facility] Day # 4 PCP Yulia Campos % — —   01/04/21 1300 138/63 98.6 °F (37 °C) Bladder 72 19 99 % — —   01/04/21 1200 145/65 98.6 °F (37 °C) Bladder 73 17 100 % — —       Intake/Output:   Last 3 shifts:   Intake/Output       01/03/21 0700 - 01/04/21 0659 01/04/21 0700 - 01/05/21 0659 01/05 0.95 01/05/2021    BUN 15 01/05/2021     01/05/2021    K 3.7 01/05/2021     01/05/2021    CO2 31.0 01/05/2021     (H) 01/05/2021    CA 8.3 (L) 01/05/2021    ALB 2.6 (L) 01/03/2021    ALKPHO 80 01/03/2021    BILT 0.4 01/03/2021    TP 5.1 right hemidiaphragm when compared to previous study which may suggest right sub pulmonic effusion and or right basal atelectasis. Follow-up study advised. Follow-up CT scan would help to further evaluate. Left lung field is clear.   Persistent moderate m

## 2021-01-05 NOTE — TELEPHONE ENCOUNTER
Spoke to brother and informed him of the information found as noted below. He states that hospital staff will not look into this chart ([id number]) for further information. He verbalized understanding and is thankful for the information.

## 2021-01-05 NOTE — PROGRESS NOTES
Vascular Access Note    Vascular Access Screening:   Allergies to Lidocaine: no  Allergies to Latex: no  Presence of Pacemaker/Defibrillator: No  Mastectomy with Lymph Node Dissection: No  AV Fistula / AV Graft: No  Dialysis Catheter: No  Central Line: for

## 2021-01-06 NOTE — PROGRESS NOTES
[facility] 37  [address]  [phone]        INPATIENT NEUROLOGY   FOLLOW UP PROGRESS NOTE    Date of Admission:  1/1/2021  Date of Consult Follow Up:  1/5/2021  Ed Bookbinder Patient Status:   In Temp (24hrs), Av.7 °F (37.1 °C), Min:98.6 °F (37 °C), Max:98.8 °F (37.1 °C)      Intake/Output:    Intake/Output Summary (Last 24 hours) at 2021 1017  Last data filed at 2021 0558  Gross per 24 hour   Intake 2836.51 ml   Output 2150 ml   Net • propofol 30 mcg/kg/min (01/05/21 2537)             Test results/Imaging:     Performed an independent visualization of : head CT   Imaging revealed:   Chronic left occipital stroke. Disclaimer: This record was dictated using [address] [place].  There

## 2021-01-06 NOTE — OCCUPATIONAL THERAPY NOTE
OCCUPATIONAL THERAPY EVALUATION - INPATIENT     Room Number: 211/211-A  Evaluation Date: 1/6/2021  Type of Evaluation: Initial  Presenting Problem: (cardiac arrest)    Physician Order: IP Consult to Occupational Therapy  Reason for Therapy: ADL/IADL Dysfun simplification techniques;ADL training;Balance activities; Functional transfer training; Endurance training;UE strengthening/ROM; Patient/Family training;Equipment eval/education; Compensatory technique education       OCCUPATIONAL THERAPY MEDICAL/SOCIAL HISTO A Lot  -   Putting on and taking off regular upper body clothing?: A Lot  -   Taking care of personal grooming such as brushing teeth?: A Lot  -   Eating meals?: A Lot    AM-PAC Score:  Score: 12  Approx Degree of Impairment: 66.57%  Standardized Score (AM

## 2021-01-06 NOTE — PROGRESS NOTES
Pulmonary/Critical Care Follow Up Note    HPI:   Luke Bobo is a 67year old male with Patient presents with:  Anne Clement      PCP Silvano Cook MD  Admission Attending Maria E Ayers 15 Day #5    Extubated  Confused  C/o kg), SpO2 94 %.     Intake/Output Summary (Last 24 hours) at 1/6/2021 1439  Last data filed at 1/6/2021 0601  Gross per 24 hour   Intake 830.58 ml   Output 525 ml   Net 305.58 ml     NAD  confused  Lung course bs  cv reg   abd soft +bs  Ext warm + trace rebeka

## 2021-01-06 NOTE — PLAN OF CARE
Iv anbx ,Heparin drip, amiodarone drip continues. O2 at 4 l n/c, not in any respiratory distress.     Problem: Patient Centered Care  Goal: Patient preferences are identified and integrated in the patient's plan of care  Description: Interventions:  - What facilitate oxygenation and minimize respiratory effort  - Oxygen supplementation based on oxygen saturation or ABGs  - Provide Smoking Cessation handout, if applicable  - Encourage broncho-pulmonary hygiene including cough, deep breathe, Incentive Spiromet symptoms of bleeding or hemorrhage  - Monitor labs and vital signs for trends  - Administer supportive blood products/factors, fluids and medications as ordered and appropriate  - Administer supportive blood products/factors as ordered and appropriate  Out

## 2021-01-06 NOTE — PROGRESS NOTES
[facility]  Progress Note    Elizabeth Guerra Patient Status:  Inpatient    3/26/1945 MRN [id number]   Location [facility] 2W/SW Attending Bhupinder Mata MD   Hosp Day # 5 PCP Terry Bowen MD     Assessment:    1.   Out of hos rhonchi. Normal excursions and effort. Abdomen: Soft, non-tender. Extremities: Without clubbing, cyanosis or edema. Peripheral pulses are 2+. Skin: Warm and dry.      Labs:  Lab Results   Component Value Date    WBC 6.1 01/06/2021    HGB 9.9 01/06/202

## 2021-01-06 NOTE — PHYSICAL THERAPY NOTE
PHYSICAL THERAPY EVALUATION - INPATIENT     Room Number: 211/211-A  Evaluation Date: 1/6/2021  Type of Evaluation: Initial   Physician Order: PT Eval and Treat       Reason for Therapy: Mobility Dysfunction and Discharge Planning    PHYSICAL THERAPY ASSES tachycardia) ([facility]     s/p ablation   • VT [facility]ricular [facility]) [facility]        Past Surgical History  Past Surgical History:   Procedure Laterality Date   • OTHER      [facility]       HOME SITUATION  Type of Home: (staying at an extended stay motel) Ax2    Exercise/Education Provided:  Bed mobility  Gait training  Transfer training    Patient End of Session: Up in chair    CURRENT GOALS    Goals to be met by: 1/20/21  Patient Goal Patient's self-stated goal is: to go home   Goal #1 Patient is able to

## 2021-01-06 NOTE — SLP NOTE
ADULT SWALLOWING EVALUATION    ASSESSMENT    ASSESSMENT/OVERALL IMPRESSION:  PPE REQUIRED. THIS THERAPIST WORE GLOVES, DROPLET MASK, AND GOGGLES FOR DURATION OF EVALUATION. HANDS WASHED UPON ENTRANCE/EXIT. SLP BSSE orders received and acknowledged.  SHAHANA saez List  Principal Problem:    Cardiac arrest [facility])  Active Problems:    V-tach [facility])    Atrial fibrillation, unspecified type ([facility]    Ventricular fibrillation [facility])      Past Medical History  Past Medical History:   Diagnosis Date   • A-fib [facility])    • CVA ( Elevation Timing: Appears impaired  Laryngeal Elevation Strength: Appears impaired  Laryngeal Elevation Coordination: Appears impaired  (Please note: Silent aspiration cannot be evaluated clinically.  Videofluoroscopic Swallow Study is required to rule-out

## 2021-01-07 NOTE — PLAN OF CARE
Problem: Patient Centered Care  Goal: Patient preferences are identified and integrated in the patient's plan of care  Description: Interventions:  - What would you like us to know as we care for you?  I live alone  - Provide timely, complete, and accurat measures for life threatening arrhythmias  - Monitor electrolytes and administer replacement therapy as ordered  Outcome: Progressing     Problem: RESPIRATORY - ADULT  Goal: Achieves optimal ventilation and oxygenation  Description: INTERVENTIONS:  - Asses risk factors for pressure ulcer development  - Assess and document skin integrity  - Monitor for areas of redness and/or skin breakdown  - Initiate interventions, skin care algorithm/standards of care as needed  Outcome: Progressing     Problem: Yasmine Forman changes in neurological status  - Initiate measures to prevent increased intracranial pressure  - Maintain blood pressure and fluid volume within ordered parameters to optimize cerebral perfusion and minimize risk of hemorrhage  - Monitor temperature, gluc

## 2021-01-07 NOTE — PROGRESS NOTES
Pulmonary/Critical Care Follow Up Note    HPI:   Luke Bobo is a 67year old male with Patient presents with:  Anne Clement      PCP Silvano Cook MD  Admission Attending Maria E Ayers 15 Day #6      Confused  No sob  No CP resp. rate 21, height 5' 7.32\" (1.71 m), weight 166 lb 7.2 oz (75.5 kg), SpO2 (!) 81 %.     Intake/Output Summary (Last 24 hours) at 1/7/2021 1114  Last data filed at 1/7/2021 0600  Gross per 24 hour   Intake 706 ml   Output 900 ml   Net -194 ml     NAD  C

## 2021-01-07 NOTE — PROGRESS NOTES
[facility]  [address] [facility], [address] [place] [address]  [phone]        INPATIENT NEUROLOGY   FOLLOW UP PROGRESS NOTE  [facility]    Ed Bookbinder Patient Status:  Inpatient     3/26/1945 MRN [id number] Pertinent positive and negatives per HPI. All others were reviewed and negative. Objective   OBJECTIVE:   Last vitals and weight :  Blood pressure 156/68, pulse 73, temperature 98.7 °F (37.1 °C), temperature source Temporal, resp.  rate 23, height 67 - Plantar response: flexor bilaterally       Results:   Laboratory Data:  Lab Results   Component Value Date    WBC 5.7 01/07/2021    HGB 10.2 (L) 01/07/2021    HCT 31.3 (L) 01/07/2021    .0 (L) 01/07/2021    CREATSERUM 0.90 01/07/2021    BUN 16 01/ Result Value Ref Range    Magnesium 2.6 1.6 - 2.6 mg/dL   CBC W/ DIFFERENTIAL    Collection Time: 01/05/21  5:41 AM   Result Value Ref Range    WBC 5.0 4.0 - 11.0 x10(3) uL    RBC 3.43 (L) 3.80 - 5.80 x10(6)uL    HGB 10.6 (L) 13.0 - 17.5 g/dL    HCT 33.0 ( RDW 15.8 (H) 11.0 - 15.0 %    RDW-SD 55.8 (H) 35.1 - 46.3 fL    .0 (L) 150.0 - 450.0 10(3)uL   POCT GLUCOSE    Collection Time: 01/05/21 12:51 PM   Result Value Ref Range    POC Glucose  143 (H) 70 - 99 mg/dL   PTT, ACTIVATED    Collection Time: 01 .0 (L) 150.0 - 450.0 10(3)uL    Neutrophil Absolute Prelim 4.88 1.50 - 7.70 x10 (3) uL    Neutrophil Absolute 4.88 1.50 - 7.70 x10(3) uL    Lymphocyte Absolute 0.48 (L) 1.00 - 4.00 x10(3) uL    Monocyte Absolute 0.51 0.10 - 1.00 x10(3) uL    Eosino HGB 10.2 (L) 13.0 - 17.5 g/dL    HCT 31.3 (L) 39.0 - 53.0 %    MCV 96.0 80.0 - 100.0 fL    MCH 31.3 26.0 - 34.0 pg    MCHC 32.6 31.0 - 37.0 g/dL    RDW-SD 53.6 (H) 35.1 - 46.3 fL    RDW 15.3 (H) 11.0 - 15.0 %    .0 (L) 150.0 - 450.0 10(3)uL    Neut Total critical care time, including face to face time, reviewing the chart, reviewing the imaging, and discussing the care with the patient's nurse  was 35 minutes, more than 50% of the time was spent in counseling and/or coordination of care related to re

## 2021-01-07 NOTE — PROCEDURES
[place]  S/AMG Cardiac Cath Procedure Note  Sarkis Guan Patient Status:  Inpatient    3/26/1945 MRN [id number]   Location [facility] Attending Julisa Steel MD   [facility] Day # 6 PCP Vladimir Marroquin, Patient was assessed and monitoring of oxygen, heart rate and blood pressure by nurse and myself during the exam 28 minutes.       Jack Dobbs MD  01/07/21

## 2021-01-07 NOTE — SLP NOTE
SLP attempt this AM. Per RN Viral, pt to remain NPO at this time for testing. SLP to follow up as pt cleared for PO trials. Please contact SLP with any questions, concerns, and/or change in status. Thank you. Christie Leon

## 2021-01-07 NOTE — PLAN OF CARE
Pt is alert, but confused at bedside. Pt only can correctly state his name/, and forgets time/place/situation. VS stable, pt on 4L NC. Heparin gtt and amio drip running. Pt remains NPO due to failed SLP eval, reeval tomorrow. BS q6.  Bed alarm in place f artery perfusion - ex.  Angina  - Evaluate fluid balance, assess for edema, trend weights  Outcome: Progressing  Goal: Absence of cardiac arrhythmias or at baseline  Description: INTERVENTIONS:  - Continuous cardiac monitoring, monitor vital signs, obtain 1 Evaluate effectiveness of GI medications  - Encourage mobilization and activity  - Obtain nutritional consult as needed  - Establish a toileting routine/schedule  - Consider collaborating with pharmacy to review patient's medication profile  Outcome: Progr as needed  - Ensure adequate protection for wounds/incisions during mobilization  - Obtain PT/OT consults as needed  - Advance activity as appropriate  - Communicate ordered activity level and limitations with patient/family  Outcome: Progressing     Probl

## 2021-01-07 NOTE — OCCUPATIONAL THERAPY NOTE
Attempted to see pt for f/u occupational therapy tx session - however tp getting cardiac cath procedure. Will f/u 1/8.      Osman Mohamud OTR/L  [facility]   #[phone]

## 2021-01-07 NOTE — PROGRESS NOTES
Provided pre procedure CHG bath and shave per cardiologist order, height and weight in epic, 0.9 20 ml/hr, consent needs to be completed via phone with brother.

## 2021-01-07 NOTE — BH REHAB NOTE
Attempted to see pt for PT txt session, eyal pt off floor getting cath procedure. Will follow up 1/8/21.

## 2021-01-07 NOTE — PROGRESS NOTES
[facility]  Progress Note    Victoria Galindo Patient Status:  Inpatient    3/26/1945 MRN [id number]   Location [facility] 2W/SW Attending Ren Henley MD   Hosp Day # 6 PCP Tarsha Turner MD     Assessment:    1.   Out of hos Value Date    INR 1.16 01/01/2021     Lab Results   Component Value Date     01/07/2021    K 3.8 01/07/2021     01/07/2021    CO2 28.0 01/07/2021    BUN 16 01/07/2021    CREATSERUM 0.90 01/07/2021     01/07/2021    CA 8.8 01/07/2021

## 2021-01-08 NOTE — PROGRESS NOTES
Pulmonary/Critical Care Follow Up Note    HPI:   Sherri Frey is a 67year old male with Patient presents with:  Betty Woods      PCP Malgorzata Mccarthy MD  Admission Attending Maria E Brice 15 Day #7      Confused  No sob  No CP Chloride (PF) 0.9 % 10 mL IV push, 40 mg, Intravenous, QAM AC  •  Piperacillin Sod-Tazobactam So (ZOSYN) 3.375 g in dextrose 5 % 100 mL ADD-vantage, 3.375 g, Intravenous, Q8H      Allergies:     Other                   UNKNOWN    Comment:Unknown pt's allerg torri today after cath     DVT px  SCDs     EOL (from admission)  D/w with brother who lives in [place]  Another brother in [address] [place] [address] does not think pt would want additional CPR  I asked him to discuss with his brother as well  DNR/Full  Plan readdress

## 2021-01-08 NOTE — ANESTHESIA POSTPROCEDURE EVALUATION
Patient: Barb Crenahun    Procedure Summary     Date: 01/08/21 Room / Location: [place] [address].    Anesthesia Start: 1694 Anesthesia Stop: 4316    Procedure: EP DUAL CHAMBER ICD Diagnosis: (.)    Scheduled Providers: Jeanne Chambers,

## 2021-01-08 NOTE — PLAN OF CARE
Problem: Patient Centered Care  Goal: Patient preferences are identified and integrated in the patient's plan of care  Description: Interventions:  - What would you like us to know as we care for you?  I live alone  - Provide timely, complete, and accurat measures for life threatening arrhythmias  - Monitor electrolytes and administer replacement therapy as ordered  Outcome: Progressing     Problem: RESPIRATORY - ADULT  Goal: Achieves optimal ventilation and oxygenation  Description: INTERVENTIONS:  - Asses risk factors for pressure ulcer development  - Assess and document skin integrity  - Monitor for areas of redness and/or skin breakdown  - Initiate interventions, skin care algorithm/standards of care as needed  Outcome: Progressing     Problem: Lowell Nolen changes in neurological status  - Initiate measures to prevent increased intracranial pressure  - Maintain blood pressure and fluid volume within ordered parameters to optimize cerebral perfusion and minimize risk of hemorrhage  - Monitor temperature, gluc

## 2021-01-08 NOTE — PROGRESS NOTES
[facility]  Progress Note    Hayley Stiles Patient Status:  Inpatient    3/26/1945 MRN [id number]   Location [facility] 2W/SW Attending Arthur Song MD   Hosp Day # 7 PCP Margaux Eubanks MD     Assessment:    1.   Out of hos Readings from Last 2 Encounters:  01/08/21 : 173 lb 4.5 oz (78.6 kg)  01/07/21 : 165 lb 5.5 oz (75 kg)      Physical Exam:    General: Alert and oriented x 3,  No apparent distress. HEENT: No focal deficits. Neck: No JVD, carotids 2+ no bruits.   Soft tis

## 2021-01-08 NOTE — PROGRESS NOTES
[facility]        [facility]  [address] [facility], [address] [place] [address]  [phone]        INPATIENT NEUROLOGY   FOLLOW UP PROGRESS NOTE  [facility]    Hayley Stiles Patient Status:  Inpatient     3/26/1945 MRN [id number] Subjective      States he is an extended stay hotel in [place] [address]. I tell the pt he was in a hospital. He states \"oh yeah, b/c I was sick. \" does not remember he had a cardiac arrest. When asked again later he still cannot recall.   States his brother was he - Cranial Nerves: No gaze preference. Visual fields: full  Pupils are  3mm briskly constricting to 2 mm and equally round and reactive to light  in a well lit room. EOMI. No nystagmus. No ptosis. V1-V3 intact B/L to light touch. No pathological facial asymm Collection Time: 01/05/21 11:17 PM   Result Value Ref Range    POC Glucose  125 (H) 70 - 99 mg/dL   POCT GLUCOSE    Collection Time: 01/06/21  5:29 AM   Result Value Ref Range    POC Glucose  111 (H) 70 - 99 mg/dL   BASIC METABOLIC PANEL (8)    Collection Collection Time: 01/06/21 11:53 AM   Result Value Ref Range    POC Glucose  112 (H) 70 - 99 mg/dL   PTT, ACTIVATED    Collection Time: 01/06/21  1:40 PM   Result Value Ref Range    PTT 42.9 (H) 23.2 - 35.3 seconds   POCT GLUCOSE    Collection Time: 01/06/ Immature Granulocyte Absolute 0.10 0.00 - 1.00 x10(3) uL    Neutrophil % 75.0 %    Lymphocyte % 9.5 %    Monocyte % 9.1 %    Eosinophil % 3.9 %    Basophil % 0.7 %    Immature Granulocyte % 1.8 %   HEMOGLOBIN A1C    Collection Time: 01/07/21  5:02 AM   Re .0 150.0 - 450.0 10(3)uL    Neutrophil Absolute Prelim 4.27 1.50 - 7.70 x10 (3) uL    Neutrophil Absolute 4.27 1.50 - 7.70 x10(3) uL    Lymphocyte Absolute 0.45 (L) 1.00 - 4.00 x10(3) uL    Monocyte Absolute 0.67 0.10 - 1.00 x10(3) uL    Eosinophil CONCLUSION:  1. No acute intracranial process. 2. Stable moderate-sized chronic ischemic cortical infarction along the left posterior cerebral artery territory. 3. Stable mild chronic microangiopathic ischemic changes.     Dictated by (CST): Joycelyn Amaya

## 2021-01-08 NOTE — ANESTHESIA PREPROCEDURE EVALUATION
Anesthesia PreOp Note    HPI:     Ash Hooper is a 67year old male who presents for preoperative consultation requested by: * No surgeons listed *    Date of Surgery: 1/8/2021    * No procedures listed *  Indication: * No pre-op diagnosis entered * with meals, GEORGINA Berry, 200 mg at 01/08/21 1528    •  Metoprolol Succinate ER (Toprol XL) 24 hr tab 50 mg, 50 mg, Oral, Daily Beta Blocker, GEORGINA Berry    •  lisinopril tab 5 mg, 5 mg, Oral, Daily, Jeannette Rodriguez APRN, 5 mg at Inability: Not on file      Transportation needs        Medical: Not on file        Non-medical: Not on file    Tobacco Use      Smoking status: Current Every Day Smoker    Substance and Sexual Activity      Alcohol use: Not on file      Drug use: Not on f and his pulse is 90. His respiration is 25 and oxygen saturation is 95%.     01/08/21  0900 01/08/21  1000 01/08/21  1200 01/08/21  1400   BP: 154/76 148/74 158/81 (!) 176/99   Pulse: 86 90 96 90   Resp: 21 24 26 25   Temp:   98.6 °F (37 °C)    TempSrc:   T has no motor deficits noted. EF 35%. K+ was erroneously high, now 4.5. Passed swallow study this morning but has been NPO for procedure. Plan GA with LMA.  Risks discussed, patient agrees and understands  Informed Consent Plan and Risks Discussed With:  Demetrio Monreal

## 2021-01-08 NOTE — DIETARY NOTE
ADULT NUTRITION REASSESSMENT     Pt is at moderate nutrition risk. Pt does not meet malnutrition criteria.       RECOMMENDATIONS TO MD:  See Nutrition Intervention     NUTRITION DIAGNOSIS/PROBLEM:  Inadequate protein energy intake related to intubation as Discharge and transfer of nutrition care to new setting or provider: [facility]    ANTHROPOMETRICS:  HT: 172.7 cm (5' 7.99\")  WT: 78.6 kg (173 lb 4.5 oz)   BMI: Body mass index is 26.88 kg/m².   BMI CLASSIFICATION: 25-29.9 kg/m2 - ov < > 113* 111 110   CO2 31.0   < > 30.0 28.0 28.0   PHOS 2.0*  --  2.6  --   --    OSMOCALC 305*   < > 305* 302* 298*    < > = values in this interval not displayed.      NUTRITION RELATED PHYSICAL FINDINGS:  - Body Fat/Muscle Mass: well nourished per visual

## 2021-01-08 NOTE — CONSULTS
[facility]    Cardiac Electrophysiology Consultation  HARMAN Adam Rater Location: [address]/    3/26/1945 MRN [id number]   Consulting Date 2021 [facility] [phone]   Consulting Physician GEORGINA Feliciano Attendin ablation in 2014. Today patient is alert and oriented. He feels well, and is looking forward to becoming more active as he hasnt been out of bed during this hospital stay.      History:  Past Medical History:   Diagnosis Date   • A-fib ([address]    • CVA (c distress  HEENT - normal conjunctiva, moist mucosa  Neck - Supple, no LAD  Lungs - nonlabored, clear bilaterally  Heart - JVP 14 cm H2O, carotids normal; RRR, nl S1/S2; trace edema  Abd - soft, nontender  Ext - arthritic changes  Neuro - A+Ox3, no facial d

## 2021-01-09 NOTE — PROGRESS NOTES
Shraddha Degroot [facility] to see if arcadio is still needed. Awaiting response.

## 2021-01-09 NOTE — PLAN OF CARE
ICD placed today. Heparin and amio gtt both D/Tan. Currently on 2L NC. Passed swallow evaluation. Eating a ground/cardiac diet with a good appetite. Brother at bedside today and updated on plan of care. No c/o of pain throughout shift.      Problem: Patient baseline  Description: INTERVENTIONS:  - Continuous cardiac monitoring, monitor vital signs, obtain 12 lead EKG if indicated  - Evaluate effectiveness of antiarrhythmic and heart rate control medications as ordered  - Initiate emergency measures for life t routine/schedule  - Consider collaborating with pharmacy to review patient's medication profile  Outcome: Progressing     Problem: SKIN/TISSUE INTEGRITY - ADULT  Goal: Skin integrity remains intact  Description: INTERVENTIONS  - Assess and document risk fa appropriate  - Communicate ordered activity level and limitations with patient/family  Outcome: Progressing     Problem: NEUROLOGICAL - ADULT  Goal: Achieves stable or improved neurological status  Description: INTERVENTIONS  - Assess for and report change

## 2021-01-09 NOTE — PROGRESS NOTES
Double RN skin check done prior to transfer off Unit. Skin check performed by this RN and Agustín Bowman RN    Wounds are as follows:  Surgical incision to left upper chest, generalized dry skin, no pressure sores.     Will remain available for any further questio

## 2021-01-09 NOTE — PLAN OF CARE
Problem: Patient Centered Care  Goal: Patient preferences are identified and integrated in the patient's plan of care  Description: Interventions:  - What would you like us to know as we care for you?  I live alone  - Provide timely, complete, and accurat measures for life threatening arrhythmias  - Monitor electrolytes and administer replacement therapy as ordered  Outcome: Progressing     Problem: RESPIRATORY - ADULT  Goal: Achieves optimal ventilation and oxygenation  Description: INTERVENTIONS:  - Asses risk factors for pressure ulcer development  - Assess and document skin integrity  - Monitor for areas of redness and/or skin breakdown  - Initiate interventions, skin care algorithm/standards of care as needed  Outcome: Progressing     Problem: Regan Andrade changes in neurological status  - Initiate measures to prevent increased intracranial pressure  - Maintain blood pressure and fluid volume within ordered parameters to optimize cerebral perfusion and minimize risk of hemorrhage  - Monitor temperature, gluc

## 2021-01-09 NOTE — PHYSICAL THERAPY NOTE
PHYSICAL THERAPY RE-EVALUATION - INPATIENT     Room Number: 663/447-V       Presenting Problem: cardiac arrest 1/1    Problem List  Principal Problem:    Cardiac arrest [facility])  Active Problems:    V-tach [facility])    Atrial fibrillation, unspecified type ([facility] being ready for therapy.     OBJECTIVE  Precautions: Cardiac;Limb alert - left(sling)    WEIGHT BEARING RESTRICTION  Weight Bearing Restriction: L upper extremity     L Upper Extremity: Non-Weight Bearing          PAIN ASSESSMENT   Rating: (no pain reported Status    Goal #2 Patient is able to demonstrate transfers Sit to/from Stand at assistance level: mod independent with walker - rolling      Goal #2  Current Status     Goal #3 Patient is able to ambulate 15 feet with assist device: walker - rolling at ass

## 2021-01-09 NOTE — OCCUPATIONAL THERAPY NOTE
OCCUPATIONAL THERAPY Re-Evaluation - INPATIENT        Room Number: 808/518-T    Presenting Problem: (cardiac arrest)    Problem List  Principal Problem:    Cardiac arrest [facility])  Active Problems:    V-tach [facility])    Atrial fibrillation, unspecified type ([facility] Recommendations: TBD     PLAN  OT Treatment Plan: Balance activities; Energy conservation/work simplification techniques;ADL training;Functional transfer training; Endurance training;Patient/Family education;Patient/Family training;Equipment eval/education;C functional transfer with min A x1  Comment: mod a    Patient will complete toileting with mod A   Comment: ongoing    Patient will tolerate standing for 3 minutes in prep for adls with CGA  Comment: ongoing              Goals  on: 21  Frequency:

## 2021-01-09 NOTE — PLAN OF CARE
Problem: Patient Centered Care  Goal: Patient preferences are identified and integrated in the patient's plan of care  Description: Interventions:  - What would you like us to know as we care for you?  I live alone  - Provide timely, complete, and accurat measures for life threatening arrhythmias  - Monitor electrolytes and administer replacement therapy as ordered  Outcome: Progressing     Problem: GASTROINTESTINAL - ADULT  Goal: Minimal or absence of nausea and vomiting  Description: INTERVENTIONS:  Huong Dior straining and forceful nose blowing  Outcome: Progressing     Problem: NEUROLOGICAL - ADULT  Goal: Achieves stable or improved neurological status  Description: INTERVENTIONS  - Assess for and report changes in neurological status  - Initiate measures to p

## 2021-01-09 NOTE — PROGRESS NOTES
[facility] Pharmacy Note:  Renal Dose Adjustment for Tramadol Lesa Morton    Sherri Frey has been prescribed Tramadol (ULTRAM)  mg orally every 6 hours as needed for pain. Estimated Creatinine Clearance: 25.3 mL/min (A) (based on SCr of 2.35 mg/dL (H)).

## 2021-01-09 NOTE — PROGRESS NOTES
[facility]     Progress Note        Jose G File Patient Status:  Inpatient    3/26/1945 MRN [id number]   Location [facility] 2W/SW Attending Miriam Bond MD   Hosp Day # 8 PCP Taylor Salguero MD       Subjective: PRN    •  morphINE sulfate (PF) 4 MG/ML injection 4 mg, 4 mg, Intravenous, Q10 Min PRN    •  morphINE sulfate (PF) 10 MG/ML injection 6 mg, 6 mg, Intravenous, Q10 Min PRN    •  lactated ringers infusion, , Intravenous, Continuous    •  Clopidogrel Bisulfat Dictated by (CST): Linh Oliveros MD on 1/07/2021 at 9:36 PM     Finalized by (CST): Linh Oliveros MD on 1/07/2021 at 9:40 PM                  Assessment   1. Status post cardiac arrest  2. Acute respiratory failure  3.   Acute kidney injury, imp

## 2021-01-09 NOTE — PROGRESS NOTES
Spoke with James Lyn from public safety regarding grey colored jacket patient does not recall bringing in. Per security there is no jacket in lost and found.

## 2021-01-10 NOTE — PROGRESS NOTES
· [facility]      Subjective:  Up in chair no dyspnea or pain    Objective:  /84 (BP Location: Right arm)   Pulse 92   Temp 98.3 °F (36.8 °C) (Oral)   Resp 18   Ht 5' 7.32\" (1.71 m)   Wt 165 lb 14.4 oz (75.3 kg)   SpO2 95%   BMI 25.73 k

## 2021-01-10 NOTE — PROGRESS NOTES
[facility] Pharmacy Note:  Renal Dose Adjustment    Tramadol (Ultram) dosing had been previously adjusted by pharmacy due to renal insufficiency for Noel Almazan. Estimated Creatinine Clearance: 77.1 mL/min (based on SCr of 0.77 mg/dL).     Due to improved re

## 2021-01-10 NOTE — PROGRESS NOTES
[facility]    Progress Note    Marianna Pacheco Patient Status:  Inpatient    3/26/1945 MRN [id number]   Location [facility] 3W/SW Attending Slime Fischer MD   Hosp Day # 9 PCP Chaya Robison MD       Subjective: Lab Results   Component Value Date    WBC 7.6 01/10/2021    HGB 10.5 (L) 01/10/2021    HCT 32.1 (L) 01/10/2021    .0 01/10/2021    CREATSERUM 0.77 01/10/2021    BUN 11 01/10/2021     01/10/2021    K 3.9 01/10/2021     01/10/2021    C

## 2021-01-10 NOTE — PLAN OF CARE
Received patient from PCCU alert and oriented to person, place, situation. Disoriented to time. Weaned to 1L NC. Denies pain. Patient is forgetful, impulsive attempting to climb out of bed on occasion. Redirection and safety education reinforced.  Brother, pulses, skin color and temperature  - Assess for signs of decreased coronary artery perfusion - ex.  Angina  - Evaluate fluid balance, assess for edema, trend weights  Outcome: Progressing  Goal: Absence of cardiac arrhythmias or at baseline  Description: I products/factors, fluids and medications as ordered and appropriate  - Administer supportive blood products/factors as ordered and appropriate  Outcome: Progressing  Goal: Free from bleeding injury  Description: (Example usage: patient with low platelets)

## 2021-01-10 NOTE — PHYSICAL THERAPY NOTE
PHYSICAL THERAPY TREATMENT NOTE - INPATIENT     Room Number: 532/666-L       Presenting Problem: cardiac arrest 1/1    Problem List  Principal Problem:    Cardiac arrest [facility])  Active Problems:    V-tach [facility])    At[facility], unspecified type ([facility] Static Sitting: Fair -  Dynamic Sitting: Fair -           Static Standing: Poor +  Dynamic Standing: Poor +    ACTIVITY TOLERANCE                         O2 WALK                  AM-PAC '6-Clicks' INPATIENT SHORT FORM - BASIC MOBILITY  How much difficulty demonstrate independence with home activity/exercise instructions provided to patient in preparation for discharge.    Goal #5   Current Status     Goal #6     Goal #6  Current Status

## 2021-01-10 NOTE — PROGRESS NOTES
[facility]     Progress Note        Camelia Rosales Patient Status:  Inpatient    3/26/1945 MRN [id number]   Location [facility] 2W/SW Attending Guillaume Kate MD   [facility] Day # 9 PCP Akil Simon MD       Subjective: sulfate (PF) 2 MG/ML injection 2 mg, 2 mg, Intravenous, Q10 Min PRN    •  morphINE sulfate (PF) 4 MG/ML injection 4 mg, 4 mg, Intravenous, Q10 Min PRN    •  morphINE sulfate (PF) 10 MG/ML injection 6 mg, 6 mg, Intravenous, Q10 Min PRN    •  Clopidogrel Bis extubation  -Patient appears to be neurologically appropriate status post hypothermia protocol.  -Continue empiric antibiotic therapy at this time. Cannot rule out right basilar pneumonia.   -Status post ICD placement.  -Further recommendations per cardiol

## 2021-01-10 NOTE — PLAN OF CARE
Problem: Patient Centered Care  Goal: Patient preferences are identified and integrated in the patient's plan of care  Description: Interventions:  - What would you like us to know as we care for you?  I live alone  - Provide timely, complete, and accurat or at baseline  Description: INTERVENTIONS:  - Continuous cardiac monitoring, monitor vital signs, obtain 12 lead EKG if indicated  - Evaluate effectiveness of antiarrhythmic and heart rate control medications as ordered  - Initiate emergency measures for routine/schedule  - Consider collaborating with pharmacy to review patient's medication profile  Outcome: Progressing     Problem: SKIN/TISSUE INTEGRITY - ADULT  Goal: Skin integrity remains intact  Description: INTERVENTIONS  - Assess and document risk fa patient with low platelets)  INTERVENTIONS:  - Avoid intramuscular injections, enemas and rectal medication administration  - Ensure safe mobilization of patient  - Hold pressure on venipuncture sites to achieve adequate hemostasis  - Assess for signs and

## 2021-01-10 NOTE — PLAN OF CARE
Problem: CARDIOVASCULAR - ADULT  Goal: Maintains optimal cardiac output and hemodynamic stability  Description: INTERVENTIONS:  - Monitor vital signs, rhythm, and trends  - Monitor for bleeding, hypotension and signs of decreased cardiac output  - Evaluate ordered and tolerated  - Nasogastric tube to low intermittent suction as ordered  - Evaluate effectiveness of ordered antiemetic medications  - Provide nonpharmacologic comfort measures as appropriate  - Advance diet as tolerated, if ordered  - Obtain nutr

## 2021-01-11 NOTE — CONSULTS
Physician Medicine & Rehabilitation Consult Note         SUBJECTIVE:    Chief Complaint: To assess rehabilitation needs following Mobility and ADL dysfunction s/p Cardiac arrest [facility]) as per request of Dr. Simin Miranda. Emil Greer     History of Present Illness: Review Surgical History:   Procedure Laterality Date   • OTHER      [facility]        Medications:  • aspirin  81 mg Oral Daily   • [START ON 1/12/2021] lisinopril  5 mg Oral Daily   • rivaroxaban  20 mg Oral Daily with food   • Metoprolol Succinate ER  75 mg Oral Daily or hearing problems dysphagia chest pain or shortness of breath or abdominal pain no nausea vomiting or constipation or diarrhea. No significant weight changes or appetite changes or sleep problems. No joint aches or muscle cramps.     OBJECTIVE:     01/1 01/10/21  0505 01/11/21  0526 01/11/21  1407   * 128* 101*   BUN 11 12 16   CREATSERUM 0.77 0.84 0.85   GFRAA 102 98 98   GFRNAA 88 85 85   CA 8.8 8.6 8.9    142 142   K 3.9 3.8 3.5    107 105   CO2 32.0 33.0* 34.0*     Lab Results   Com potential will be provided in the following domains.  Bed mobility or transfers , Cognitive, speech, language or swallowing retraining, Range of motion and strengthening and Ambulation  · There are at least two functional impairments requiring at least mini

## 2021-01-11 NOTE — PHYSICAL THERAPY NOTE
PHYSICAL THERAPY TREATMENT NOTE - INPATIENT     Room Number: 069/673-W       Presenting Problem: cardiac arrest 1/1    Problem List  Principal Problem:    Cardiac arrest [facility])  Active Problems:    V-tach [facility])    Atrial fibrillation, unspecified type ([facility] with arms (e.g., wheelchair, bedside commode, etc.): A Lot   -   Moving from lying on back to sitting on the side of the bed?: A Lot   How much help from another person does the patient currently need. ..   -   Moving to and from a bed to a chair (including

## 2021-01-11 NOTE — SLP NOTE
SPEECH DAILY NOTE - INPATIENT    ASSESSMENT & PLAN   ASSESSMENT    Patient seen for dysphagia f/u per plan of care, received alert and upright in bed, grossly oriented x3, cooperative, afebrile, O2 via NC, in NAD. Per RN, patient tolerating current diet.  Albin Sexton aspiration/distress. REVISED GOAL 1/11/21:  The patient will tolerate chopped consistency and thin liquids without overt signs or symptoms of aspiration with 100 % accuracy over 1-2 session(s).    Goal Met; REVISED   Goal #2 The patient will utilize comp

## 2021-01-11 NOTE — CM/SW NOTE
Received Vmail from pt's brother/Pramod inquiring about completing POA Finances while pt admitted to [facility]. SW discussed/consulted w/ SW Supervisor Timur white. SW met w/ pt in his room and assessed. 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  Pt w

## 2021-01-11 NOTE — PLAN OF CARE
Problem: Patient Centered Care  Goal: Patient preferences are identified and integrated in the patient's plan of care  Description: Interventions:  - What would you like us to know as we care for you?  I live alone  - Provide timely, complete, and accurat measures for life threatening arrhythmias  - Monitor electrolytes and administer replacement therapy as ordered  Outcome: Progressing     Problem: RESPIRATORY - ADULT  Goal: Achieves optimal ventilation and oxygenation  Description: INTERVENTIONS:  - Asses risk factors for pressure ulcer development  - Assess and document skin integrity  - Monitor for areas of redness and/or skin breakdown  - Initiate interventions, skin care algorithm/standards of care as needed  Outcome: Progressing     Problem: Norma Calero changes in neurological status  - Initiate measures to prevent increased intracranial pressure  - Maintain blood pressure and fluid volume within ordered parameters to optimize cerebral perfusion and minimize risk of hemorrhage  - Monitor temperature, gluc

## 2021-01-11 NOTE — PROGRESS NOTES
[facility]  Progress Note    Maria Del Carmen Elizabeth Patient Status:  Inpatient    3/26/1945 MRN [id number]   Location [facility] 3W/SW Attending Perlita Castro MD   Hosp Day # 10 PCP Linda Faust MD     Assessment:    1.   Out of murmur  Lungs: Clear without wheezes, rales, rhonchi. Normal excursions and effort. Abdomen: Soft, non-tender. Extremities: Without clubbing, cyanosis or edema. Peripheral pulses are 2+. Groin site soft, nontender. Skin: Warm and dry.      Labs:

## 2021-01-11 NOTE — PLAN OF CARE
Pt received awake and alert. Up to chair for breakfast. Rt leg weakness. Denies SOB and chest pain. Awaiting PMR for acute rehab. Fall precautions in place.    Problem: Patient Centered Care  Goal: Patient preferences are identified and integrated in the pa signs, obtain 12 lead EKG if indicated  - Evaluate effectiveness of antiarrhythmic and heart rate control medications as ordered  - Initiate emergency measures for life threatening arrhythmias  - Monitor electrolytes and administer replacement therapy as o profile  Outcome: Progressing     Problem: SKIN/TISSUE INTEGRITY - ADULT  Goal: Skin integrity remains intact  Description: INTERVENTIONS  - Assess and document risk factors for pressure ulcer development  - Assess and document skin integrity  - Monitor fo patient/family  Outcome: Progressing     Problem: NEUROLOGICAL - ADULT  Goal: Achieves stable or improved neurological status  Description: INTERVENTIONS  - Assess for and report changes in neurological status  - Initiate measures to prevent increased intr

## 2021-01-12 NOTE — PLAN OF CARE
Problem: CARDIOVASCULAR - ADULT  Goal: Maintains optimal cardiac output and hemodynamic stability  Description: INTERVENTIONS:  - Monitor vital signs, rhythm, and trends  - Monitor for bleeding, hypotension and signs of decreased cardiac output  - Evaluate of CO2 retention  Outcome: Progressing     Problem: GASTROINTESTINAL - ADULT  Goal: Minimal or absence of nausea and vomiting  Description: INTERVENTIONS:  - Maintain adequate hydration with IV or PO as ordered and tolerated  - Nasogastric tube to low inte

## 2021-01-12 NOTE — PROGRESS NOTES
[facility]    Progress Note    Ana Magallanes Patient Status:  Inpatient    3/26/1945 MRN [id number]   Location [facility] 3W/SW Attending Gagandeep Salinas MD   Hosp Day # 11 PCP Puja Harrison MD       Subjective: Results   Component Value Date    WBC 6.7 01/11/2021    HGB 10.3 (L) 01/11/2021    HCT 31.1 (L) 01/11/2021    .0 01/11/2021    CREATSERUM 0.85 01/11/2021    BUN 16 01/11/2021     01/11/2021    K 3.5 01/11/2021     01/11/2021    CO2 34.0

## 2021-01-12 NOTE — PLAN OF CARE
Problem: Patient Centered Care  Goal: Patient preferences are identified and integrated in the patient's plan of care  Description: Interventions:  - What would you like us to know as we care for you?  I live alone  - Provide timely, complete, and accurat Problem: RESPIRATORY - ADULT  Goal: Achieves optimal ventilation and oxygenation  Description: INTERVENTIONS:  - Assess for changes in respiratory status  - Assess for changes in mentation and behavior  - Position to facilitate oxygenation and minimize r ambulating w/ or w/o assistive devices  - Assist with transfers and ambulation using safe patient handling equipment as needed  - Ensure adequate protection for wounds/incisions during mobilization  - Obtain PT/OT consults as needed  - Advance activity as

## 2021-01-12 NOTE — CM/SW NOTE
09: 00AM  Received request from Dr. Kasey Oseguera to reach out to [facility] to inquire about PMR consult. LELA contacted Christine Mejia w/ [facility] - she confirmed PMR saw pt yesterday and recommends Acute.  LELA requested they submit for insurance approval.    LELA updated

## 2021-01-12 NOTE — PROGRESS NOTES
[facility]    Progress Note    Ana Magallanes Patient Status:  Inpatient    3/26/1945 MRN [id number]   Location [facility] 3W/SW Attending Gagandeep Salinas MD   Hosp Day # 10 PCP Vicki Wheatley MD       Subjective: Value Date    WBC 6.7 01/11/2021    HGB 10.3 (L) 01/11/2021    HCT 31.1 (L) 01/11/2021    .0 01/11/2021    CREATSERUM 0.85 01/11/2021    BUN 16 01/11/2021     01/11/2021    K 3.5 01/11/2021     01/11/2021    CO2 34.0 (H) 01/11/2021    GL

## 2021-01-12 NOTE — PROGRESS NOTES
[facility] AND [facility]  Progress Note    Amanda Zambrano Patient Status:  Inpatient    3/26/1945 MRN [id number]   Location [facility] 3W/SW Attending Santana Gandhi MD   Hosp Day # 11 PCP Weston Drew MD     Assessment:    1.   VT/VF kg)  01/07/21 : 165 lb 5.5 oz (75 kg)      Physical Exam:    General: Alert and oriented x 3,  No apparent distress. HEENT: No focal deficits. Neck: No JVD, carotids 2+ no bruits.   Cardiac: Regular rate and rhythm, S1, S2 normal, no murmur  Lungs: Clear TROP 0.461 () 01/01/2021    TROP <0.045 01/01/2021        Medications:    • Metoprolol Succinate ER  25 mg Oral Once   • [START ON 1/13/2021] Metoprolol Succinate ER  100 mg Oral Daily Beta Blocker   • aspirin  81 mg Oral Daily   • lisinopril  5 mg Or

## 2021-01-12 NOTE — PHYSICAL THERAPY NOTE
PHYSICAL THERAPY TREATMENT NOTE - INPATIENT     Room Number: 644/533-W       Presenting Problem: (cardiac arrest)    Problem List  Principal Problem:    Cardiac arrest [facility])  Active Problems:    V-tach [facility])    Atrial fibrillation, unspecified type ([facility] down on and standing up from a chair with arms (e.g., wheelchair, bedside commode, etc.): A Lot   -   Moving from lying on back to sitting on the side of the bed?: A Lot   How much help from another person does the patient currently need. ..   -   Moving to

## 2021-01-12 NOTE — SLP NOTE
SPEECH DAILY NOTE - INPATIENT    ASSESSMENT & PLAN   ASSESSMENT    PPE REQUIRED. THIS SLP WORE GLOVES, GOGGLES, AND DROPLET MASK. HANDS SANITIZED/WASHED UPON ENTRANCE/EXIT.      Pt seen for swallowing therapy to monitor swallowing tolerance and train anju po trials. Previous speech therapy session have been completed with pt tolerating thin liquids without overt clinical signs of aspiration. Last completed CXR 1/9/21. No diet upgrade at this time.   Pt would benefit from continued speech therapy to contin sips.  The pt practiced small, single sips of thin liquids with the SLP present. No further overt clinical signs of aspiration observed with thin liquids nor chopped solids: no reflexive cough, no throat clearing, and vocal quality remains clear.      In P

## 2021-01-13 NOTE — CM/SW NOTE
Received notice from Michael Bryant w/ [facility] - insurance authorized. Pt has been assigned to Room 3316 and they can accept pt around 1PM.    LELA confirmed w/ Dr. Guillermo Gregory that pt is medically cleared for discharge today 1/13.     LELA updated pt's RN/Lane and DOUGLAS RN Da

## 2021-01-13 NOTE — PHYSICAL THERAPY NOTE
PHYSICAL THERAPY TREATMENT NOTE - INPATIENT     Room Number: 502/445-Q       Presenting Problem: (cardiac arrest)    Problem List  Principal Problem:    Cardiac arrest [facility])  Active Problems:    V-tach [facility])    Atrial fibrillation, unspecified type ([facility] Turning over in bed (including adjusting bedclothes, sheets and blankets)?: A Lot   -   Sitting down on and standing up from a chair with arms (e.g., wheelchair, bedside commode, etc.): A Lot   -   Moving from lying on back to sitting on the side of the be #6  Current Status

## 2021-01-13 NOTE — PLAN OF CARE
Plan: [facility], pending insurance. Problem: Patient Centered Care  Goal: Patient preferences are identified and integrated in the patient's plan of care  Description: Interventions:  - What would you like us to know as we care for you?  I live a medications as ordered  - Initiate emergency measures for life threatening arrhythmias  - Monitor electrolytes and administer replacement therapy as ordered  Outcome: Progressing     Problem: RESPIRATORY - ADULT  Goal: Achieves optimal ventilation and oxyg intact  Description: INTERVENTIONS  - Assess and document risk factors for pressure ulcer development  - Assess and document skin integrity  - Monitor for areas of redness and/or skin breakdown  - Initiate interventions, skin care algorithm/standards of ca status  Description: INTERVENTIONS  - Assess for and report changes in neurological status  - Initiate measures to prevent increased intracranial pressure  - Maintain blood pressure and fluid volume within ordered parameters to optimize cerebral perfusion

## 2021-01-13 NOTE — SLP NOTE
SPEECH DAILY NOTE - INPATIENT    ASSESSMENT & PLAN   ASSESSMENT    PPE REQUIRED. THIS SLP WORE GLOVES, GOGGLES, AND DROPLET MASK. HANDS SANITIZED/WASHED UPON ENTRANCE/EXIT.      Pt seen for swallowing therapy to monitor swallowing tolerance and train anju 1/9/21. Plan and recommendations reviewed with patient and RN. Swallow recommendations posted on whiteboard. CXR 1/9/21:  CONCLUSION:   1. Status post placement permanent pacemaker. 2. Mild CHF.     Diet Recommendations - Solids: Mechanical soft chopp to follow small sips, slow rate, slow rate, and alternate consistencies.  No straws utilized.      In Progress       FOLLOW UP  Follow Up Needed: Yes  SLP Follow-up Date: 01/14/21  Number of Visits to Meet Established Goals: 4    Session: 3 following re-BSE

## 2021-01-13 NOTE — DISCHARGE SUMMARY
[facility] HOSPITALIST  DISCHARGE SUMMARY     Aure Valladares Patient Status:  Inpatient    3/26/1945 MRN [id number]   Location [facility] 3W/SW Attending Sharon Nance MD   Hosp Day # 12 PCP Aarti Jo MD     DATE OF ADMISSION:  gallop, rub, murmur. Pulm: Effort and breath sounds normal. No distress, wheezes, rales, rhonchi. Abd: Soft, NTND, BS normal, no mass, no HSM, no rebound/guarding. Neuro: Normal reflexes, CN. Sensory/motor exams grossly normal deficit.  Coordination  an 75 MG Tabs  Commonly known as: PLAVIX        dilTIAZem HCl ER Coated Beads 120 MG Tb24  Commonly known as: CARDIZEM LA        metFORMIN HCl 500 MG Tabs  Commonly known as: GLUCOPHAGE              Where to Get Your Medications      Please  your presc

## 2021-01-13 NOTE — PLAN OF CARE
Patient to be discharged to [facility] at 1300 via ambulance, report given to [facility]. Patient aware of discharge plan. Problem: Patient Centered Care  Goal: Patient preferences are identified and integrated in the patient's plan of care  Description:  In monitor vital signs, obtain 12 lead EKG if indicated  - Evaluate effectiveness of antiarrhythmic and heart rate control medications as ordered  - Initiate emergency measures for life threatening arrhythmias  - Monitor electrolytes and administer replacemen pharmacy to review patient's medication profile  Outcome: Adequate for Discharge     Problem: SKIN/TISSUE INTEGRITY - ADULT  Goal: Skin integrity remains intact  Description: INTERVENTIONS  - Assess and document risk factors for pressure ulcer development appropriate  - Communicate ordered activity level and limitations with patient/family  Outcome: Adequate for Discharge     Problem: NEUROLOGICAL - ADULT  Goal: Achieves stable or improved neurological status  Description: INTERVENTIONS  - Assess for and re

## 2021-01-13 NOTE — PROGRESS NOTES
· [facility]      Subjective:  Up in chair no dyspnea or CP complaining of right knee pain    Objective:  /46 (BP Location: Right arm)   Pulse 62   Temp 98.2 °F (36.8 °C) (Oral)   Resp 16   Ht 5' 7.32\" (1.71 m)   Wt 153 lb 6.4 oz (69.6

## 2021-01-14 NOTE — PAYOR COMM NOTE
--------------  DISCHARGE REVIEW    [employer] [facility]  Subscriber #:  [id number]  Authorization Number: [id number]    Admit date: 1/1/21  Admit time:  3389  Discharge Date: 1/13/2021  2:08 PM     Admitting Physician:   Attending Physician:  Katiuska Thompson catheterization. Echocardiogram showed significant cardiomyopathy. ICD was placed. Patient also developed atrial fibrillation, started on amiodarone. Started on Xarelto due to significant cardiomyopathy, A. fib.     Patient understands return to the lilia Tabs  Commonly known as: XARELTO      Take 1 tablet (20 mg total) by mouth daily with food. Quantity: 30 tablet  Refills: 0     torsemide 10 MG Tabs  Commonly known as: DEMADEX      Take 1 tablet (10 mg total) by mouth daily.    Quantity: 30 tablet  Refil possible [facility] a visit  [facility]    The above plan and follow-up instructions were reviewed with the patient and they verbalized understanding and agreement.   They understand to return to the emergency room for any concernin

## 2021-01-22 NOTE — PROCEDURES
[facility]    Cardiac Electrophysiology Operative Note    [facility]   [facility] [id number] MRN [id number]   Admission Date 1/1/2021 Procedure Date 1/8/2021   Attending Physician No att. providers found Procedure Phy separate ties of 0-ethibond suture and the collar.   Next, a 6-Upper sorbian sheath was advanced over the wire into the axillary vein, through which the right atrial lead was advanced to the right atrial appendage, where lead stability and electrical parameters we

## 2021-01-25 ENCOUNTER — TELEPHONE (OUTPATIENT)
Dept: CARDIOLOGY | Age: 76
End: 2021-01-25

## 2021-02-04 NOTE — TELEPHONE ENCOUNTER
PTs brother called stating that pt is currently in rehab at [address] [place] [address] but they are trying to place him in a facility for assistant living. Pt needs a psych evaluation to determine if he is capable of making decisions? ?   Brother asked to call him back, [Healthy Appearing] : healthy appearing [Interactive] : interactive [Obese] : obese [Acanthosis Nigricans___] : acanthosis nigricans over [unfilled] [Normal Appearance] : normal appearance [Well formed] : well formed [Normally Set] : normally set [Normal S1 and S2] : normal S1 and S2 [Clear to Ausculation Bilaterally] : clear to auscultation bilaterally [Abdomen Soft] : soft [Abdomen Tenderness] : non-tender [] : no hepatosplenomegaly [Normal] : normal  [Murmur] : no murmurs

## 2021-02-11 NOTE — TELEPHONE ENCOUNTER
Left voicemail. Patient needs appointment for follow-up and re-evaluation --- pt is due for follow-up; and to determine which specialty he will need to be referred to to evaluate mental status. To call office back to make an appointment.

## 2021-02-15 NOTE — TELEPHONE ENCOUNTER
Kami from [place] [id number] called to inform doctor that pt is going to receive nursing services, physical therapy and occupational therapy

## 2021-02-19 NOTE — PROGRESS NOTES
[facility]  Return Patient Progress Note      HPI:   Patient presents with:  Hospital F/U      Mannie Lo is a 67year old male presenting for: f/u    Has a significant  has a past medical history of A-fib ([address] Cataract, CVA 11.9 10.0 - 20.0    Calcium, Total 8.7 8.5 - 10.1 mg/dL    Calculated Osmolality 306 (H) 275 - 295 mOsm/kg    GFR, Non- 38 (L) >=60    GFR, -American 44 (L) >=60   TROPONIN I   Result Value Ref Range    Troponin <0.045 <0.045 ng/mL mg/dL    HDL Cholesterol 41 40 - 59 mg/dL    Triglycerides 154 (H) 30 - 149 mg/dL    LDL Cholesterol 94 <100 mg/dL    VLDL 31 (H) 0 - 30 mg/dL    Non HDL Chol 125 <130 mg/dL   COMP METABOLIC PANEL (14)   Result Value Ref Range    Glucose 110 (H) 70 - 99 mg Calcium, Total 8.5 8.5 - 10.1 mg/dL    Calculated Osmolality 299 (H) 275 - 295 mOsm/kg    GFR, Non- 84 >=60    GFR, -American 97 >=60   CBC, PLATELET; NO DIFFERENTIAL   Result Value Ref Range    WBC 7.2 4.0 - 11.0 x10(3) uL    RBC Potassium 3.6 3.5 - 5.1 mmol/L    Chloride 111 98 - 112 mmol/L    CO2 29.0 21.0 - 32.0 mmol/L    Anion Gap 4 0 - 18 mmol/L    BUN 12 7 - 18 mg/dL    Creatinine 0.90 0.70 - 1.30 mg/dL    BUN/CREA Ratio 13.3 10.0 - 20.0    Calcium, Total 7.8 (L) 8.5 - 10.1 m - 32.0 mmol/L    Anion Gap 3 0 - 18 mmol/L    BUN 15 7 - 18 mg/dL    Creatinine 0.95 0.70 - 1.30 mg/dL    BUN/CREA Ratio 15.8 10.0 - 20.0    Calcium, Total 8.3 (L) 8.5 - 10.1 mg/dL    Calculated Osmolality 305 (H) 275 - 295 mOsm/kg    GFR, Non-African Amer Ref Range    PTT 57.3 (H) 23.2 - 35.3 seconds   HEMOGLOBIN A1C   Result Value Ref Range    HgbA1C 6.5 (H) <5.7 %    Estimated Average Glucose 140 (H) 68 - 126 mg/dL   PTT, ACTIVATED   Result Value Ref Range    PTT 48.3 (H) 23.2 - 35.3 seconds   BASIC METAB 2.0 mmol/L    Potassium Blood Gas 4.3 3.3 - 5.1 mmol/L    Sodium Blood Gas 138 135 - 145 mmol/L    Lactic Acid (Blood Gas) 0.8 0.5 - 2.2 mmol/L    Ionized Calcium 1.20 0.95 - 1.32 mmol/L    Total Hemoglobin 10.7 (L) 13.5 - 17.5 g/dL    ABG O2 Saturation 98 Value Ref Range    WBC 6.7 4.0 - 11.0 x10(3) uL    RBC 3.34 (L) 3.80 - 5.80 x10(6)uL    HGB 10.3 (L) 13.0 - 17.5 g/dL    HCT 31.1 (L) 39.0 - 53.0 %    MCV 93.1 80.0 - 100.0 fL    MCH 30.8 26.0 - 34.0 pg    MCHC 33.1 31.0 - 37.0 g/dL    RDW 15.4 (H) 11.0 - mmol/L    BUN 23 (H) 7 - 18 mg/dL    Creatinine 0.98 0.70 - 1.30 mg/dL    BUN/CREA Ratio 23.5 (H) 10.0 - 20.0    Calcium, Total 8.7 8.5 - 10.1 mg/dL    Calculated Osmolality 302 (H) 275 - 295 mOsm/kg    GFR, Non- 75 >=60    GFR, -Aileen (H) 70 - 99 mg/dL   POCT GLUCOSE   Result Value Ref Range    POC Glucose  131 (H) 70 - 99 mg/dL   POCT GLUCOSE   Result Value Ref Range    POC Glucose  143 (H) 70 - 99 mg/dL   POCT GLUCOSE   Result Value Ref Range    POC Glucose  162 (H) 70 - 99 mg/dL   PO 0.00 - 0.70 x10(3) uL    Basophil Absolute 0.04 0.00 - 0.20 x10(3) uL    Immature Granulocyte Absolute 0.10 0.00 - 1.00 x10(3) uL    Neutrophil % 58.3 %    Lymphocyte % 30.4 %    Monocyte % 5.1 %    Eosinophil % 3.7 %    Basophil % 0.7 %    Immature Granul Basophil % 0.2 %    Immature Granulocyte % 0.2 %   CBC W/ DIFFERENTIAL   Result Value Ref Range    WBC 5.1 4.0 - 11.0 x10(3) uL    RBC 3.37 (L) 3.80 - 5.80 x10(6)uL    HGB 10.6 (L) 13.0 - 17.5 g/dL    HCT 32.3 (L) 39.0 - 53.0 %    MCV 95.8 80.0 - 100.0 fL HCT 30.6 (L) 39.0 - 53.0 %    MCV 96.8 80.0 - 100.0 fL    MCH 31.3 26.0 - 34.0 pg    MCHC 32.4 31.0 - 37.0 g/dL    RDW-SD 56.4 (H) 35.1 - 46.3 fL    RDW 15.8 (H) 11.0 - 15.0 %    .0 (L) 150.0 - 450.0 10(3)uL    Neutrophil Absolute Prelim 4.88 1.50 - 10(3)uL    Neutrophil Absolute Prelim 4.27 1.50 - 7.70 x10 (3) uL    Neutrophil Absolute 4.27 1.50 - 7.70 x10(3) uL    Lymphocyte Absolute 0.45 (L) 1.00 - 4.00 x10(3) uL    Monocyte Absolute 0.67 0.10 - 1.00 x10(3) uL    Eosinophil Absolute 0.31 0.00 - 0.7 x10(3) uL    Eosinophil Absolute 0.42 0.00 - 0.70 x10(3) uL    Basophil Absolute 0.03 0.00 - 0.20 x10(3) uL    Immature Granulocyte Absolute 0.07 0.00 - 1.00 x10(3) uL    Neutrophil % 75.1 %    Lymphocyte % 8.5 %    Monocyte % 9.0 %    Eosinophil % 6.0 % TAKE 1 TABLET(20 MG) BY MOUTH EVERY NIGHT 30 tablet 0   • Glucose Blood (RA TRUETEST TEST) In Vitro Strip To check sugars once daily 100 strip 2      PMH:  Past Medical History:   Diagnosis Date   • A-fib [facility])    • Cataract    • CVA (cerebral vascular acc pain, constipation, blood in stool and abdominal distention. Endocrine: Negative for cold intolerance, heat intolerance and polyuria. Genitourinary: Negative for dysuria, urgency and hematuria.    Musculoskeletal: Negative for myalgias, back pain, joint wheezes. He has no rales. He exhibits no tenderness. Abdominal: Soft. Bowel sounds are normal. He exhibits no distension and no mass. There is no hepatosplenomegaly. There is no abdominal tenderness. There is no rebound and no guarding.  Musculoskeletal: were placed in this encounter. Imaging & Consults:  None          No follow-ups on file. Important follow up notes/labs for next visit    Patient indicates understanding of the above recommendations and agrees to the above plan.   Reasurrance and educ

## 2021-02-22 ENCOUNTER — ANCILLARY PROCEDURE (OUTPATIENT)
Dept: CARDIOLOGY | Age: 76
End: 2021-02-22
Attending: INTERNAL MEDICINE

## 2021-02-22 VITALS — SYSTOLIC BLOOD PRESSURE: 136 MMHG | RESPIRATION RATE: 14 BRPM | HEART RATE: 68 BPM | DIASTOLIC BLOOD PRESSURE: 50 MMHG

## 2021-02-22 DIAGNOSIS — Z45.018 PACEMAKER REPROGRAMMING/CHECK: Primary | ICD-10-CM

## 2021-02-22 PROCEDURE — 93283 PRGRMG EVAL IMPLANTABLE DFB: CPT | Performed by: INTERNAL MEDICINE

## 2021-03-09 NOTE — TELEPHONE ENCOUNTER
Nurse Sultana from  [facility] called stating patient is refusing to take all his medication and wants a nurse to call him to explain to him the importance of taking his medication at all times. Patient is also having problems urinating and constipation.

## 2021-03-09 NOTE — TELEPHONE ENCOUNTER
lmtcb    Per Dr Syed Zimmerman we have to make sure we inform the patient the importance of him taking his medications to prevent any other complications.

## 2021-03-10 NOTE — TELEPHONE ENCOUNTER
Patient called back, he states that he feels like 3 medications are causing him to get swollen and for so he stop the medications, he was advised to restart medications one at time and the importance of doing it, he was made aware of the consequences of no

## 2021-03-11 NOTE — TELEPHONE ENCOUNTER
Spoke to patient. Aissatou, speaking in full sentences in no apparent distress. He states he only has issues with breathing when he gets up and moves around. Otherwise, when sitting/relaxing, no issues. Dizziness only when getting up.   Denies palpitations,

## 2021-03-11 NOTE — TELEPHONE ENCOUNTER
Spoke to Meena Mcwilliams. She states when she left patient's home, patient was otherwise stable. Pulse went down to . Nauseated, but didn't throw up. Has not had a BM in a couple of days. Urinary issues.   Dizziness (likely orthostatic, c/o only when he

## 2021-03-11 NOTE — TELEPHONE ENCOUNTER
[facility] calling because Anne Sandoval is seeing a change in patient health. Patients heart is irregular at 104 and is fatigued with shortness of breathe, dizziness. Would like a call back to discuss how to further proceed. Please advice.

## 2021-03-15 NOTE — TELEPHONE ENCOUNTER
I agree. I will add that if he is not improving he can make an appt with me or Dr. Fernando Webster.  However I would stress importance of following up with cardiologist closely

## 2021-03-18 NOTE — TELEPHONE ENCOUNTER
Left detailed voicemail for patient. Calling for condition update.     If not improving, to call office back to make an appt to be seen by either Dr. Abbie Centeno or Dr. Fernando Webster, but also stressed importance of following up with his cardiologist.

## 2021-03-25 ENCOUNTER — TELEPHONE (OUTPATIENT)
Dept: CARDIOLOGY | Age: 76
End: 2021-03-25

## 2021-03-26 NOTE — TELEPHONE ENCOUNTER
Discussed with Dr. Tanja Mackenzie and he reviewed discharge summary. Will send medications in question to pharmacy for #30. Patient would need to follow-up in a month.   Did discuss with Janes Stubbs, that on our end, Lisinopril is noted for 5mg, not 2.5mg - and thu

## 2021-03-26 NOTE — TELEPHONE ENCOUNTER
Meena Mcwilliams from home health called regarding some concerns:    She states patient has three medications that listed on pt discharge papers that pt should be taking, but patient does not have it at home. 1. Torsemide 10mg daily  2. Lisinopril 2.5mg daily  3.

## 2021-03-26 NOTE — TELEPHONE ENCOUNTER
Colby walters -- please assist in scheduling patient in 1 month for medication follow-up with Dr. David Newby. Patient needs to KEEP his appointment on 5/20 with Dr. Anne Shaikh as that is for his annual physical.  Thank you!

## 2021-03-26 NOTE — TELEPHONE ENCOUNTER
Left detailed voicemail regarding below. Missing medications have been sent to pharmacy, but only for a 30 day supply. Dr. Leatha Cavazos would like for him to follow-up in a month to see how he is doing.     Has an upcoming appt on 5/20 with Dr. Clementina Taylor to [facility]

## 2021-03-31 ENCOUNTER — OFFICE VISIT (OUTPATIENT)
Dept: CARDIOLOGY | Age: 76
End: 2021-03-31

## 2021-03-31 VITALS
DIASTOLIC BLOOD PRESSURE: 66 MMHG | SYSTOLIC BLOOD PRESSURE: 124 MMHG | OXYGEN SATURATION: 98 % | HEART RATE: 129 BPM | BODY MASS INDEX: 25.95 KG/M2 | HEIGHT: 64 IN | WEIGHT: 152 LBS

## 2021-03-31 DIAGNOSIS — I48.0 PAROXYSMAL ATRIAL FIBRILLATION (CMD): ICD-10-CM

## 2021-03-31 DIAGNOSIS — R00.0 TACHYCARDIA, UNSPECIFIED: ICD-10-CM

## 2021-03-31 DIAGNOSIS — I42.0 DILATED CARDIOMYOPATHY (CMD): Primary | ICD-10-CM

## 2021-03-31 PROBLEM — R06.00 DYSPNEA, UNSPECIFIED TYPE: Status: ACTIVE | Noted: 2021-01-01

## 2021-03-31 PROBLEM — E78.2 MIXED HYPERLIPIDEMIA: Status: ACTIVE | Noted: 2017-10-18

## 2021-03-31 PROBLEM — I46.9 CARDIAC ARREST (CMD): Status: ACTIVE | Noted: 2021-01-01

## 2021-03-31 PROBLEM — I69.30 HISTORY OF STROKE WITH RESIDUAL DEFICIT: Status: ACTIVE | Noted: 2017-03-15

## 2021-03-31 PROBLEM — Z95.810 ICD (IMPLANTABLE CARDIOVERTER-DEFIBRILLATOR) IN PLACE: Status: ACTIVE | Noted: 2021-03-31

## 2021-03-31 PROBLEM — D64.9 ANEMIA: Status: ACTIVE | Noted: 2021-03-31

## 2021-03-31 PROBLEM — I50.9 ACUTE ON CHRONIC CONGESTIVE HEART FAILURE, UNSPECIFIED HEART FAILURE TYPE (HCC): Status: ACTIVE | Noted: 2021-01-01

## 2021-03-31 PROBLEM — I49.01 VENTRICULAR FIBRILLATION (CMD): Status: ACTIVE | Noted: 2021-01-01

## 2021-03-31 PROBLEM — I47.10 PSVT (PAROXYSMAL SUPRAVENTRICULAR TACHYCARDIA): Status: ACTIVE | Noted: 2017-03-09

## 2021-03-31 PROBLEM — D64.9 ANEMIA, UNSPECIFIED TYPE: Status: ACTIVE | Noted: 2021-01-01

## 2021-03-31 PROBLEM — I50.9 ACUTE ON CHRONIC CONGESTIVE HEART FAILURE (HCC): Status: ACTIVE | Noted: 2021-01-01

## 2021-03-31 PROBLEM — I50.20 SYSTOLIC CONGESTIVE HEART FAILURE (CMD): Status: ACTIVE | Noted: 2021-03-31

## 2021-03-31 PROCEDURE — 3078F DIAST BP <80 MM HG: CPT | Performed by: NURSE PRACTITIONER

## 2021-03-31 PROCEDURE — 99204 OFFICE O/P NEW MOD 45 MIN: CPT | Performed by: NURSE PRACTITIONER

## 2021-03-31 PROCEDURE — 3074F SYST BP LT 130 MM HG: CPT | Performed by: NURSE PRACTITIONER

## 2021-03-31 PROCEDURE — 99222 1ST HOSP IP/OBS MODERATE 55: CPT | Performed by: INTERNAL MEDICINE

## 2021-03-31 RX ORDER — LISINOPRIL 5 MG/1
1 TABLET ORAL DAILY
COMMUNITY
Start: 2021-03-27

## 2021-03-31 NOTE — ED QUICK NOTES
Orders for admission, patient is aware of plan and ready to go upstairs.  Any questions, please call ED ALEJANDRINA Grider at extension [phone]   Type of COVID test sent: rapid  COVID Suspicion level: Low    Titratable drug(s) infusing: none  Rate:n/a    LOC at time o

## 2021-03-31 NOTE — ED PROVIDER NOTES
Patient Seen in: [facility] AND [facility] Emergency Department      History   Patient presents with:  Difficulty Breathing    Stated Complaint: sob    HPI/Subjective:   HPI    50-year-old male with history of A. fib cardiac arrest status post AICD CVA hypertens Triage Vitals [03/31/21 1451]   /74   Pulse 118   Resp 24   Temp 97.7 °F (36.5 °C)   Temp src Oral   SpO2 92 %   O2 Device None (Room air)       Current:BP (!) 162/85   Pulse 102   Temp 97.7 °F (36.5 °C) (Oral)   Resp (!) 29   Ht 162.6 cm (5' 4\") PROCALCITONIN - Normal   RAPID SARS-COV-2 BY PCR - Normal   CBC WITH DIFFERENTIAL WITH PLATELET    Narrative: The following orders were created for panel order CBC WITH DIFFERENTIAL WITH PLATELET.   Procedure                               Abnormality pneumonia/atelectasis or could be related to pulmonary congestion. Small bilateral pleural effusions. Correlate clinically and follow-up studies are advised.     Dictated by (CST): Esteban Carrillo MD on 3/31/2021 at 3:47 PM     Finalized by (CST): Doreen Meyer,

## 2021-03-31 NOTE — CONSULTS
[facility]    PATIENT'S NAME: Shalini Vargas   ATTENDING PHYSICIAN: Jaz Robles, [address] [facility] PHYSICIAN: Michael Gonzalez.  Bobby Puente MD   PATIENT ACCOUNT#:   [id number]    LOCATION:  [facility] [address] [facility] 1  MEDICAL RECORD #:   [id number]       DATE OF BI hypothyroidism. PAST SURGICAL HISTORY:  He had colon surgery to repair an injury with sustained during a colonoscopy.     MEDICATIONS:  At home are thought to include atorvastatin 20 mg q.p.m., Xarelto 15 mg daily, amiodarone 200 mg daily, metoprolol suc Heart rate is 127, moderately increased. There is poor R-wave progression, occasional ectopic beats, lateral ST depression. He senses converted to sinus rhythm here in the ED.   Compared with his last EKG from March 19, 2018, that EKG was normal.    Reilly

## 2021-03-31 NOTE — ED INITIAL ASSESSMENT (HPI)
Patient presents to ER with complaints of shortness of breath. Per patient it has been occurring \" off and on\" for about 3 weeks. Admits to some mild dizziness, and fatigue. Admits Sob worse when lying flat, as well as exertion.    Patient able to sp

## 2021-03-31 NOTE — CONSULTS
Cardiology (consult dictated)    Assessment:  1. Presentation with progressive shortness of breath. Total duration of dyspnea is more than 2 months, and has been progressively worsening over that timeframe.   Today breathing became increasingly difficult

## 2021-03-31 NOTE — PROGRESS NOTES
[facility]  Return Patient Progress Note      HPI:   Patient presents with:  Breathing Problem: hard time breathing      Kapil Gill is a 67year old male presenting for: f/u    Has a significant  has a past medical history of A mg/dL    Calculated Osmolality 306 (H) 275 - 295 mOsm/kg    GFR, Non- 38 (L) >=60    GFR, -American 44 (L) >=60   TROPONIN I   Result Value Ref Range    Troponin <0.045 <0.045 ng/mL   ARTERIAL BLOOD GAS   Result Value Ref Range    Sa Triglycerides 154 (H) 30 - 149 mg/dL    LDL Cholesterol 94 <100 mg/dL    VLDL 31 (H) 0 - 30 mg/dL    Non HDL Chol 125 <130 mg/dL   COMP METABOLIC PANEL (14)   Result Value Ref Range    Glucose 110 (H) 70 - 99 mg/dL    Sodium 145 136 - 145 mmol/L    [place] Calculated Osmolality 299 (H) 275 - 295 mOsm/kg    GFR, Non- 84 >=60    GFR, -American 97 >=60   CBC, PLATELET; NO DIFFERENTIAL   Result Value Ref Range    WBC 7.2 4.0 - 11.0 x10(3) uL    RBC 4.18 3.80 - 5.80 x10(6)uL    HGB 13.1 13. 111 98 - 112 mmol/L    CO2 29.0 21.0 - 32.0 mmol/L    Anion Gap 4 0 - 18 mmol/L    BUN 12 7 - 18 mg/dL    Creatinine 0.90 0.70 - 1.30 mg/dL    BUN/CREA Ratio 13.3 10.0 - 20.0    Calcium, Total 7.8 (L) 8.5 - 10.1 mg/dL    Calculated Osmolality 298 (H) 275 - BUN 15 7 - 18 mg/dL    Creatinine 0.95 0.70 - 1.30 mg/dL    BUN/CREA Ratio 15.8 10.0 - 20.0    Calcium, Total 8.3 (L) 8.5 - 10.1 mg/dL    Calculated Osmolality 305 (H) 275 - 295 mOsm/kg    GFR, Non- 77 >=60    GFR, -American 90 >= HEMOGLOBIN A1C   Result Value Ref Range    HgbA1C 6.5 (H) <5.7 %    Estimated Average Glucose 140 (H) 68 - 126 mg/dL   PTT, ACTIVATED   Result Value Ref Range    PTT 48.3 (H) 23.2 - 35.3 seconds   BASIC METABOLIC PANEL (8)   Result Value Ref Range    Glu 5.1 mmol/L    Sodium Blood Gas 138 135 - 145 mmol/L    Lactic Acid (Blood Gas) 0.8 0.5 - 2.2 mmol/L    Ionized Calcium 1.20 0.95 - 1.32 mmol/L    Total Hemoglobin 10.7 (L) 13.5 - 17.5 g/dL    ABG O2 Saturation 98.3 94.0 - 100.0 %    Carboxyhemoglobin 2.4 ( uL    RBC 3.34 (L) 3.80 - 5.80 x10(6)uL    HGB 10.3 (L) 13.0 - 17.5 g/dL    HCT 31.1 (L) 39.0 - 53.0 %    MCV 93.1 80.0 - 100.0 fL    MCH 30.8 26.0 - 34.0 pg    MCHC 33.1 31.0 - 37.0 g/dL    RDW 15.4 (H) 11.0 - 15.0 %    RDW-SD 52.0 (H) 35.1 - 46.3 fL    P Creatinine 0.98 0.70 - 1.30 mg/dL    BUN/CREA Ratio 23.5 (H) 10.0 - 20.0    Calcium, Total 8.7 8.5 - 10.1 mg/dL    Calculated Osmolality 302 (H) 275 - 295 mOsm/kg    GFR, Non- 75 >=60    GFR, -American 86 >=60   MAGNESIUM   Result Va Result Value Ref Range    POC Glucose  131 (H) 70 - 99 mg/dL   POCT GLUCOSE   Result Value Ref Range    POC Glucose  143 (H) 70 - 99 mg/dL   POCT GLUCOSE   Result Value Ref Range    POC Glucose  162 (H) 70 - 99 mg/dL   POCT GLUCOSE   Result Value Ref Ran Basophil Absolute 0.04 0.00 - 0.20 x10(3) uL    Immature Granulocyte Absolute 0.10 0.00 - 1.00 x10(3) uL    Neutrophil % 58.3 %    Lymphocyte % 30.4 %    Monocyte % 5.1 %    Eosinophil % 3.7 %    Basophil % 0.7 %    Immature Granulocyte % 1.8 %   CBC W/ DI Granulocyte % 0.2 %   CBC W/ DIFFERENTIAL   Result Value Ref Range    WBC 5.1 4.0 - 11.0 x10(3) uL    RBC 3.37 (L) 3.80 - 5.80 x10(6)uL    HGB 10.6 (L) 13.0 - 17.5 g/dL    HCT 32.3 (L) 39.0 - 53.0 %    MCV 95.8 80.0 - 100.0 fL    MCH 31.5 26.0 - 34.0 pg MCV 96.8 80.0 - 100.0 fL    MCH 31.3 26.0 - 34.0 pg    MCHC 32.4 31.0 - 37.0 g/dL    RDW-SD 56.4 (H) 35.1 - 46.3 fL    RDW 15.8 (H) 11.0 - 15.0 %    .0 (L) 150.0 - 450.0 10(3)uL    Neutrophil Absolute Prelim 4.88 1.50 - 7.70 x10 (3) uL    Neutrophil Absolute Prelim 4.27 1.50 - 7.70 x10 (3) uL    Neutrophil Absolute 4.27 1.50 - 7.70 x10(3) uL    Lymphocyte Absolute 0.45 (L) 1.00 - 4.00 x10(3) uL    Monocyte Absolute 0.67 0.10 - 1.00 x10(3) uL    Eosinophil Absolute 0.31 0.00 - 0.70 x10(3) uL    Dieudonnei Eosinophil Absolute 0.42 0.00 - 0.70 x10(3) uL    Basophil Absolute 0.03 0.00 - 0.20 x10(3) uL    Immature Granulocyte Absolute 0.07 0.00 - 1.00 x10(3) uL    Neutrophil % 75.1 %    Lymphocyte % 8.5 %    Monocyte % 9.0 %    Eosinophil % 6.0 %    Basophil % BISULFATE 75 MG Oral Tab TAKE 1 TABLET(75 MG) BY MOUTH DAILY 30 tablet 0   • ATORVASTATIN 20 MG Oral Tab TAKE 1 TABLET(20 MG) BY MOUTH EVERY NIGHT 30 tablet 0   • Glucose Blood (RA TRUETEST TEST) In Vitro Strip To check sugars once daily 100 strip 2      P cough, chest tightness and wheezing. Cardiovascular: Negative for chest pain, palpitations and leg swelling. Gastrointestinal: Negative for nausea, abdominal pain, constipation, blood in stool and abdominal distention.    Endocrine: Negative for cold i Effort normal and breath sounds normal. No respiratory distress. He has no wheezes. He has no rales. He exhibits no tenderness. Abdominal: Soft. Bowel sounds are normal. He exhibits no distension and no mass. There is no hepatosplenomegaly.  There is no a Refills for this Visit:  Requested Prescriptions      No prescriptions requested or ordered in this encounter       No orders of the defined types were placed in this encounter. Imaging & Consults:  None          No follow-ups on file.   Important foll

## 2021-03-31 NOTE — ED QUICK NOTES
Called [facility] in [place] for updated med list as patient was unable to tell dr Margarito Louis which meds he is currently taking daily    Pharmacist reports all medications refilled 2/16/21 were the following:   Xaralto 20mg q evening  tamsulosin 0.4mg daily  Me

## 2021-03-31 NOTE — H&P
[address] [facility] Patient Status:  Emergency    3/26/1945  67year old [facility] [id number]   Location  Attending Altagracia Reece MD     PCP Jaki Segal MD         DATE OF ADMISSION: 0 total) by mouth daily. AMLODIPINE BESYLATE 10 MG ORAL TAB    Take 10 mg by mouth daily. ASPIRIN 81 MG ORAL CHEW TAB    Chew 1 tablet (81 mg total) by mouth daily.     ATORVASTATIN 20 MG ORAL TAB    TAKE 1 TABLET(20 MG) BY MOUTH EVERY NIGHT    CLOPIDOG Paying Living Expenses:   Food Insecurity:       Worried About [address] in the Last Year:       Ran Out of Food in the Last Year:   Transportation Needs:       Lack of Transportation (Medical):       Lack of Transportation (Non-Medical):   Physi prominent, there is mild to moderate pulmonary congestive change with interstitial edema. Patchy bibasilar airspace disease could suggest bibasilar pneumonia/atelectasis or could be related to pulmonary congestion. Small bilateral pleural effusions.   Cor results/imaging and discussing plan of care. All questions answered to best of my ability.     **Certification      PHYSICIAN Certification of Need for Inpatient Hospitalization - Initial Certification    Patient will require inpatient services that will re

## 2021-04-01 PROCEDURE — 99232 SBSQ HOSP IP/OBS MODERATE 35: CPT | Performed by: INTERNAL MEDICINE

## 2021-04-01 NOTE — SLP NOTE
ADULT SWALLOWING EVALUATION    ASSESSMENT    ASSESSMENT/OVERALL IMPRESSION:  PPE REQUIRED. THIS THERAPIST WORE GLOVES, DROPLET MASK, AND GOGGLES FOR DURATION OF EVALUATION. HANDS WASHED UPON ENTRANCE/EXIT.     *SLP was outside of pt's room when RN was admin collaborated with RN for MD diet orders. PLAN: SLP to complete VFSS to rule out aspiration and determine least restrictive diet and/or further dysphagia goals.      RECOMMENDATIONS   Diet Recommendations - Solids: NPO  Diet Recommendations - Liquid: NPO mildly prominent, there is mild to moderate pulmonary congestive change with interstitial edema.  Patchy bibasilar airspace disease could suggest bibasilar pneumonia/atelectasis or could be related to pulmonary   congestion.  Small bilateral pleural effusi [id number] [facility]  Speech Language Pathologist  Phone Number Ext. [phone]

## 2021-04-01 NOTE — PROGRESS NOTES
[facility]    Progress Note    Imagene Ivory Patient Status:  Inpatient    3/26/1945 MRN [id number]   Location [facility] 3W/SW Attending Errol Simmons MD   Hosp Day # 1 PCP Yvette Nunn MD     CARDIOLOGY A 04/01/2021    K 3.2 (L) 04/01/2021     04/01/2021     04/01/2021    CO2 31.0 04/01/2021    CO2 31.0 04/01/2021    GLU 91 04/01/2021    GLU 91 04/01/2021    CA 8.2 (L) 04/01/2021    CA 8.2 (L) 04/01/2021    ALB 2.5 (L) 04/01/2021    ALKPHO 113 0 stop asa while on AC to avoid tripled therapy     Recent VT arrest  -dual-chamber -ICD   -amio     Anemia  -hg 7.2 on admit down from 10 in January  -Continue Xarelto for now unless gross bleeding is noted  - Stool OB negative in ED.      HTN  - controlled

## 2021-04-01 NOTE — SLP NOTE
ADULT VIDEOFLUOROSCOPIC SWALLOWING STUDY    Admission Date: 3/31/2021  Evaluation Date: 04/01/21  Radiologist: Dr. Ramirez Cotter: Mechanical soft ground  Diet Recommendations - Liquid: Nectar thick    Further Aspiration;Cardiac  Imaging results:     CXR 3/31/21:  CONCLUSION:   1.  Heart size upper limits of normal to mildly prominent, there is mild to moderate pulmonary congestive change with interstitial edema.  Patchy bibasilar airspace disease could suggest b Moderate;Valleculae; Throughout pharynx  Cleared/Reduced with: Multiple swallows; Secondary swallow  Laryngeal Penetration: Trace  Tracheal Aspiration: None  Cough/Throat Clear Response:  (cued)  Cough/Throat Clear Effective: Partially  Strategy(ies) Impleme FCM Score: 4   FCM Impression: Abnormal     GOALS  Goal #1 SLP to complete VFSS to rule out aspiration and determine least restrictive diet and/or further dysphagia goals.    Completed on 4/1/21  Met   Goal #2 The patient will tolerate GROUND consistenc

## 2021-04-01 NOTE — CM/SW NOTE
Received MDO for Advanced Directives and POLST. Per chart review, POA HC is completed and uploaded/available in Epic. Pt's brother, [place] is the listed [address] at this time. ELLA received MDO for POLST. LELA placed POLST form on chart.  MD to

## 2021-04-01 NOTE — PLAN OF CARE
Patient is alert and oriented x4. Hemoglobin stable s/p 1 unit PRBC's. 1L NC. IV lasix. 2D echo completed. Video swallow completed. 4 beats vtach x2 noted on telemetry monitor this morning. Cardiology aware. Potassium replaced per protocol.  Ambulating x1 a Follow with Cardiology and Respiratory  - Medications as prescribed  - Labs, procedures, and imaging as ordered  - See additional Care Plan goals for specific interventions  Outcome: Progressing     Problem: SAFETY ADULT - FALL  Goal: Free from fall injury oxygenation  Description: INTERVENTIONS:  - Assess for changes in respiratory status  - Assess for changes in mentation and behavior  - Position to facilitate oxygenation and minimize respiratory effort  - Oxygen supplementation based on oxygen saturation

## 2021-04-01 NOTE — PLAN OF CARE
1 unit of PRBCs transfused overnight with no complications. Consent is signed and in the chart. Orders for social work and speech therapy consults placed. Plan is to go for an ECHO today.     Problem: Patient Centered Care  Goal: Patient preferences are jose c Progressing     Problem: SAFETY ADULT - FALL  Goal: Free from fall injury  Description: INTERVENTIONS:  - Assess pt frequently for physical needs  - Identify cognitive and physical deficits and behaviors that affect risk of falls.   - [place] fall precaut effort  - Oxygen supplementation based on oxygen saturation or ABGs  - Provide Smoking Cessation handout, if applicable  - Encourage broncho-pulmonary hygiene including cough, deep breathe, Incentive Spirometry  - Assess the need for suctioning and perform

## 2021-04-01 NOTE — OCCUPATIONAL THERAPY NOTE
OT orders received and chart reviewed. RN approving of pt participation in therapy. Treatment coordinated w/ PT. Mask,gloves,goggles worn. Pt received in bed,alert and oriented and willing to participate in therapy.  Pt was able to transition supine to sit

## 2021-04-01 NOTE — PHYSICAL THERAPY NOTE
Orders received and chart reviewed. Discussed with ALEJANDRINA Paniagua who approves participation in physical therapy; session coordinated with OT. Therapist donned goggles, gloves, and mask. Patient presented in bed with no pain, states feels tired.  Upon sitting ed

## 2021-04-01 NOTE — PROGRESS NOTES
[facility]    Progress Note    Rica Mayra Patient Status:  Inpatient    3/26/1945 MRN [id number]   Location [facility] 3W/SW Attending Daniele Cobian MD   Hosp Day # 1 PCP Quin Anne MD       SUBJECTIVE: (CPT=71045)    Result Date: 3/31/2021  CONCLUSION:  1. Heart size upper limits of normal to mildly prominent, there is mild to moderate pulmonary congestive change with interstitial edema.   Patchy bibasilar airspace disease could suggest bibasilar pneumoni 25.0-29. 9)     Essential hypertension     Tobacco use disorder     PAF (paroxysmal atrial fibrillation) ([facility])     PSVT (paroxysmal supraventricular tachycardia) ([facility]     Controlled type 2 diabetes mellitus with microalbuminuria, without long-term current admission  -Patient denies bleeding or dark black stools  -ED physician reported rectal exam/FOBT negative in the ED  -transfused 1 unit PRBC 3/31  -Continue anticoagulation as above, monitor for signs of bleeding     DVT proph: xarelto    Dispo: pending c

## 2021-04-01 NOTE — ICD/PM
BS device interrogated  afib burden and rates are elevated -55%   Noted fluid overload   Multiple nsvt episodes 2 requiring ATP however unable to determine if they are afib with rvr or vt d/w device rep who will check device settings at bedside-   Pt has n

## 2021-04-02 PROCEDURE — 99232 SBSQ HOSP IP/OBS MODERATE 35: CPT | Performed by: INTERNAL MEDICINE

## 2021-04-02 NOTE — PHYSICAL THERAPY NOTE
PHYSICAL THERAPY EVALUATION - INPATIENT     Room Number: 215/466-W  Evaluation Date: 4/2/2021  Type of Evaluation: Initial   Physician Order: PT Eval and Treat    Presenting Problem: CHF, difficulty breathing  Reason for Therapy: Mobility Dysfunction and improve stability of gait- primarily with turns. Patient functioning lower than baseline levels. Currently does not have the capacity to complete basic functional mobility safely within home environment as he lives alone.  The patient's Approx Degree of 04/16/2018   • PSVT (paroxysmal supraventricular tachycardia) ([address] 3/9/2017    S/p SVT ablation 10-31-17    • SVT (supraventricular tachycardia) ([facility])     s/p ablation   • Tobacco use disorder 3/9/2017    55+ pack year history    • VT (ventricular tachyca Oxygen: 93  Ambulation oxygen flow (liters per minute): 1      AM-PAC '6-Clicks' INPATIENT SHORT FORM - BASIC MOBILITY  How much difficulty does the patient currently have. ..  -   Turning over in bed (including adjusting bedclothes, sheets and blankets)?: functional standing balance activities requiring fall ready guarding to prepare for safe transition to home environment   Goal #4   Current Status    Goal #5 Patient to demonstrate independence with home activity/exercise instructions provided to patient i

## 2021-04-02 NOTE — PLAN OF CARE
Patient alert and oriented x4, 1L NC saturating well but continues to have SIDDIQI. Fluid restriction, strict I&Os. 5 beat of AIVR to VT at 1627. PT is recommending GINNY, patient declines at this time.   Extended peripheral IV inserted by PICC RN, on IV lasix Cardiology and Respiratory  - Medications as prescribed  - Labs, procedures, and imaging as ordered  - See additional Care Plan goals for specific interventions  Outcome: Progressing     Problem: SAFETY ADULT - FALL  Goal: Free from fall injury  Descriptio INTERVENTIONS:  - Assess for changes in respiratory status  - Assess for changes in mentation and behavior  - Position to facilitate oxygenation and minimize respiratory effort  - Oxygen supplementation based on oxygen saturation or ABGs  - Provide Smoking consult as needed  - Establish a toileting routine/schedule  - Consider collaborating with pharmacy to review patient's medication profile  Outcome: Progressing

## 2021-04-02 NOTE — CM/SW NOTE
PT is recommending GINNY upon discharge. LELA spoke with pt's Cherelle Franco, over the phone to follow up on the discharge recommendation. Eli Aiken is agreeable to the recommendation, however requests that pt does not go to [facility].  LELA explained

## 2021-04-02 NOTE — PLAN OF CARE
Patient converted to NS around 2300, had 9 beats of VT, asymptomatic, MD aware. IV ABX started. Had 1 BM, stools are yellow, c.diff negative.  Plan: continue lasix IV and IV ABX, PT/OT eval in AM    Problem: Patient Centered Care  Goal: Patient preferences interventions  Outcome: Progressing     Problem: SAFETY ADULT - FALL  Goal: Free from fall injury  Description: INTERVENTIONS:  - Assess pt frequently for physical needs  - Identify cognitive and physical deficits and behaviors that affect risk of falls. minimize respiratory effort  - Oxygen supplementation based on oxygen saturation or ABGs  - Provide Smoking Cessation handout, if applicable  - Encourage broncho-pulmonary hygiene including cough, deep breathe, Incentive Spirometry  - Assess the need for s Progressing

## 2021-04-02 NOTE — SLP NOTE
SPEECH DAILY NOTE - INPATIENT    ASSESSMENT & PLAN   ASSESSMENT  PPE REQUIRED. THIS THERAPIST WORE GLOVES, DROPLET MASK, AND GOGGLES FOR DURATION OF TX SESSION. HANDS WASHED UPON ENTRANCE/EXIT.     SLP f/u for ongoing dysphagia tx/meal assessment per recomm Recommendations - Solids: Mechanical soft ground  Diet Recommendations - Liquid: Nectar thick    Compensatory Strategies Recommended: No straws; Slow rate; Alternate consistencies;Small bites and sips;Multiple swallows  Aspiration Precautions: Upright positi meals with minimal assistance 100 % of the time across 2 sessions. Pt self fed slow/small bites/sips while upright in 90 degrees while alternating liquids/solids. No straws observed at bedside. Distractions eliminated.    In Progress   Goal #6 Pt will comp

## 2021-04-02 NOTE — PAYOR COMM NOTE
--------------  CONTINUED STAY REVIEW    [facility] Saray MA [facility]  Subscriber #:  [id number]  Authorization Number: [id number]    Admit date: 3/31/21  Admit time:  6:21 PM    Admitting Physician:   Attending Physician:  Dea Clark MD  Primary Care Physicia Action Dose Route User    4/2/2021 0759 Given 75 mg Oral Lionel Gilbert RN      dilTIAZem (cardIZEM CD) 24 hr cap 180 mg     Date Action Dose Route User    4/2/2021 0759 Given 180 mg Oral Lionel Gilbert RN      Enalapril Maleate (VASOTEC) tab 5 mg Wt 139 lb 12.8 oz (63.4 kg)   SpO2 94%   BMI 24.00 kg/m²      Intake/Output:     Intake/Output Summary (Last 24 hours) at 4/1/2021 1143  Last data filed at 4/1/2021 0907      Gross per 24 hour   Intake 1270 ml   Output 1975 ml   Net -705 ml         Wt Read 3/31/2021 at 3:49 PM              Meds:   Potassium Chloride ER (K-DUR M20) CR tab 40 mEq, 40 mEq, Oral, Q4H  amiodarone HCl (PACERONE) tab 400 mg, 400 mg, Oral, BID with meals  Metoprolol Succinate ER (Toprol XL) 24 hr tab 50 mg, 50 mg, Oral, 2x Daily(Bet hyperlipidemia     Prostatic adenocarcinoma ([facility]     Influenza vaccination declined by patient     Optic nerve atrophy     Cataract of both eyes     A[facility] atherosclerosis ([facility]     Normocytic anemia     NPDH (new persistent daily headache)     Encounter Service:  4/1/2021 11:37 AM                    BS device interrogated  afib burden and rates are elevated -55%   Noted fluid overload   Multiple nsvt episodes 2 requiring ATP however unable to determine if they are afib with rvr or vt d/w device rep who wi

## 2021-04-02 NOTE — VASCULAR ACCESS
Vascular Access Consult Note  4/2/2021     Reviewed H&P and current condition.   Past Medical History:  3/9/2017: Tobacco use disorder      Comment:  55+ pack year history   3/9/2017: PSVT (paroxysmal supraventricular tachycardia) ([facility]      Comment:  S/p S PRN   Or  dextrose 50 % injection 50 mL, 50 mL, Intravenous, Q15 Min PRN   Or  glucose (DEX4) oral liquid 30 g, 30 g, Oral, Q15 Min PRN   Or  Glucose-Vitamin C (DEX-4) chewable tab 8 tablet, 8 tablet, Oral, Q15 Min PRN  Atropine Sulfate 0.1 MG/ML injection

## 2021-04-02 NOTE — PROGRESS NOTES
[facility]    Progress Note    Andrew Last Patient Status:  Inpatient    3/26/1945 MRN [id number]   Location [facility] 3W/SW Attending Channing Campos MD   Hosp Day # 2 PCP Karly Camarillo MD       SUBJECTIVE: size upper limits of normal to mildly prominent, there is mild to moderate pulmonary congestive change with interstitial edema. Patchy bibasilar airspace disease could suggest bibasilar pneumonia/atelectasis or could be related to pulmonary congestion.   Nelsy Durham CC          Assessment  Patient Active Problem List:     Overweight (BMI 25.0-29. 9)     Essential hypertension     Tobacco use disorder     PAF (paroxysmal atrial fibrillation) ([facility])     PSVT (paroxysmal supraventricular tachycardia) ([facility])     Controlled t per cardiology  -Continue metoprolol for rate control  -Continue Xarelto for anticoagulation  -Telemetry monitoring      Type 2 diabetes mellitus   - Monitoring Blood glucose with POC checks  - SSI to cover hyperglycemia  - Hypoglycemia protocol  - Will mo

## 2021-04-02 NOTE — PROGRESS NOTES
[facility]    Progress Note    Wayne Verdugo Patient Status:  Inpatient    3/26/1945 MRN [id number]   Location [facility] 3W/SW Attending Joelle Paz MD   Hosp Day # 2 PCP Lazarus Sally, MD         Assessment an Abdomen soft, nontender, nondistended, bowel sounds present  Ext:  no clubbing, no cyanosis,no edema  Neuro: no focal deficits  Skin: no rashes or lesions    Scheduled Meds:    • piperacillin-tazobactam  3.375 g Intravenous Q8H   • amiodarone HCl  400 mg O airspace disease could suggest bibasilar pneumonia/atelectasis or could be related to pulmonary congestion. Small bilateral pleural effusions. Correlate clinically and follow-up studies are advised.     Dictated by (CST): Sarai Vázquez MD on 3/31/2021 at

## 2021-04-02 NOTE — OCCUPATIONAL THERAPY NOTE
OCCUPATIONAL THERAPY EVALUATION - INPATIENT     Room Number: 522/577-S  Evaluation Date: 4/2/2021  Type of Evaluation: Initial  Presenting Problem:  (chf)    Physician Order: IP Consult to Occupational Therapy  Reason for Therapy: ADL/IADL Dysfunction and GINNY.     DISCHARGE RECOMMENDATIONS  OT Discharge Recommendations: Sub-acute rehabilitation  OT Device Recommendations: TBD    PLAN  OT Treatment Plan: Patient/Family education;Patient/Family training; Compensatory technique education; Endurance training; [facility] [facility]. Pt reports being independent w/ adls, light homemaking and ambulation w/ rw. Pt has a friend who assists w/ groceries.  Pt does not drive    SUBJECTIVE  \"I'm feeling better today\"    OCCUPATIONAL THERAPY EXAMINATION      OBJECTIVE  Pr a    Patient End of Session: Up in chair;Needs met;Call light within reach;RN aware of session/findings; All patient questions and concerns addressed; Alarm set    OT Goals  Patients self stated goal is: unstated     Patient will complete functional transfer

## 2021-04-02 NOTE — PAYOR COMM NOTE
--------------  ADMISSION REVIEW     Mikael Koenig MA [facility]  Subscriber #:  [id number]  Authorization Number: [id number]    Admit date: 3/31/21  Admit time:  6:21 PM       Admitting Physician:   Attending Physician:  Tonya Kapoor MD  Primary Care Physician • OTHER      n                Social History    Tobacco Use           Smokeless tobacco: Never Used    Vaping Use      Vaping Use: Never used    Alcohol use: Not Currently          Drug use:  No             Review of Systems    Positive for stated compla NATRIURETIC PEPTIDE - Abnormal; Notable for the following components:    Pro-Beta Natriuretic Peptide 12,601 (*)     All other components within normal limits   CBC W/ DIFFERENTIAL - Abnormal; Notable for the following components:    RBC 2.75 (*)     HGB 7 congestion with interstitial edema. Patchy bibasilar airspace disease may suggest bibasilar pneumonia and or atelectasis. Small bilateral pleural effusions. BONES: No fracture or visible bony lesion. OTHER: Negative. CONCLUSION:  1.  Heart size u Noted POA    * (Principal) Acute on chronic congestive heart failure ([facility] [address] I50.9 3/31/2021 Unknown    Controlled type 2 diabetes mellitus with microalbuminuria, without long-term current use of insulin ([facility]) E11.29, R80.9 3/15/2017 Yes    Essential hyperte • HTN (hypertension), benign 3/9/2017   • No diabetic retinopathy in both eyes    • [facility] 04/16/2018   • PSVT (paroxysmal supraventricular tachycardia) ([facility] 3/9/2017    S/p SVT ablation 10-31-17    • SVT (supraventricular tachycardia) ( Single      Spouse name: Not on file      Number of children: Not on file      Years of education: Not on file      Highest education level: Not on file    Tobacco Use            Smokeless tobacco: Never Used    Vaping Use      Vaping Use: Never used    Veliz NECK:  Supple. Trach midline  CHEST:  Symmetrical movement on inspiration  HEART:  S1 and S2 heard. RRR   LUNGS:  Air entry was decreased. Bibasilar crackles. No increased work of breathing or wheezes   ABDOMEN: Soft and non-tender.   Bowel sounds were p SSI to cover hyperglycemia  - Hypoglycemia protocol  - Will monitor and adjust agents as needed.           Normocytic anemia  -Hemoglobin noted to be 7.2 on admission  -Patient denies bleeding or dark black stools  -ED physician reported rectal exam/FOBT ne Jeb Carver, RN      atorvastatin (LIPITOR) tab 20 mg     Date Action Dose Route User    4/1/2021 2053 Given 20 mg Oral Ekaterina Arias, ALEJANDRINA      Clopidogrel Bisulfate (PLAVIX) tab 75 mg     Date Action Dose Route User    4/1/2021 0837 Given 75 mg Oral Cardiology   Consults      Unsigned Transcription                  Unsigned Transcription           [facility]     PATIENT'S NAME: Purnima Zapata   ATTENDING PHYSICIAN: Kamla Steven. [facility], [address] PHYSICIAN: Jerrod Perea.  Natalia Gupta, [place] [address] history of paroxysmal atrial fibrillation which was present both today and in January, hypertension, hypothyroidism.     PAST SURGICAL HISTORY:  He had colon surgery to repair an injury with sustained during a colonoscopy.     MEDICATIONS:  At home are tho mildly increased ventricular response. Heart rate is 127, moderately increased. There is poor R-wave progression, occasional ectopic beats, lateral ST depression. He senses converted to sinus rhythm here in the ED.   Compared with [facility] on 3[facility]31[facility]2021        Detailed Report     Chart Review: Note Routing History    No routing history on file. 4/1  CARDIOLOGY ATTENDING     Note reviewed and patient examined independently. Still dyspneic with activity.   Rhythm varia    04/01/2021     CO2 31.0 04/01/2021     CO2 31.0 04/01/2021     GLU 91 04/01/2021     GLU 91 04/01/2021     CA 8.2 (L) 04/01/2021     CA 8.2 (L) 04/01/2021     ALB 2.5 (L) 04/01/2021     ALKPHO 113 04/01/2021     BILT 0.5 04/01/2021     TP 5.5 (L) AC to avoid tripled therapy      Recent VT arrest  -dual-chamber -ICD   -amio      Anemia  -hg 7.2 on admit down from 10 in January  -Continue Xarelto for now unless gross bleeding is noted  - Stool OB negative in ED.      HTN  - controlled        Brianna Muse

## 2021-04-03 PROCEDURE — 99232 SBSQ HOSP IP/OBS MODERATE 35: CPT | Performed by: INTERNAL MEDICINE

## 2021-04-03 NOTE — PLAN OF CARE
Problem: Patient Centered Care  Goal: Patient preferences are identified and integrated in the patient's plan of care  Description: Interventions:  - What would you like us to know as we care for you?  I live at an extended stay [facility] in [address] [place], [place] [address] that affect risk of falls.   - [place] fall precautions as indicated by assessment.  - Educate pt/family on patient safety including physical limitations  - Instruct pt to call for assistance with activity based on assessment  - Modify environment to redu Spirometry  - Assess the need for suctioning and perform as needed  - Assess and instruct to report SOB or any respiratory difficulty  - Respiratory Therapy support as indicated  - Manage/alleviate anxiety  - Monitor for signs/symptoms of CO2 retention  Esperanza Nolasco CR. HGB stable this morning 8.1. Worked with PT today.

## 2021-04-03 NOTE — SLP NOTE
SLP attempt for ongoing dysphagia therapy and meal assessment. Pt out of the room at the time of attempt. SLP to f/u as patient available/schedule allows. RN alerted to attempt and reports patient tolerates diet well with no overt CSA. Thank you.

## 2021-04-03 NOTE — PLAN OF CARE
Pt vss overnight. C/o headache- PRN tylenol effective. . Ground diet, NTL, pills crushed in a/s. IV lasix BID. Accucheck ACHS. Plan for repeat cxray today.    Problem: Patient Centered Care  Goal: Patient preferences are identified and integrated in t FALL  Goal: Free from fall injury  Description: INTERVENTIONS:  - Assess pt frequently for physical needs  - Identify cognitive and physical deficits and behaviors that affect risk of falls.   - [place] fall precautions as indicated by assessment.  - Educ oxygen saturation or ABGs  - Provide Smoking Cessation handout, if applicable  - Encourage broncho-pulmonary hygiene including cough, deep breathe, Incentive Spirometry  - Assess the need for suctioning and perform as needed  - Assess and instruct to repor

## 2021-04-03 NOTE — PHYSICAL THERAPY NOTE
PHYSICAL THERAPY TREATMENT NOTE - INPATIENT     Room Number: 041/518-X       Presenting Problem: CHF, difficulty breathing    Problem List  Principal Problem:    Acute on chronic congestive heart failure, unspecified heart failure type ([facility]  Active Proble Static Sitting: Good  Dynamic Sitting: Good           Static Standing: Fair +  Dynamic Standing: Fair    ACTIVITY TOLERANCE                         O2 WALK                  AM-PAC '6-Clicks' INPATIENT SHORT FORM - BASIC MOBILITY  How much difficulty does t Goal #4 Patient will participate in dynamic functional standing balance activities requiring fall ready guarding to prepare for safe transition to home environment   Goal #4   Current Status ongoing   Goal #5 Patient to demonstrate independence with home

## 2021-04-03 NOTE — CONSULTS
[facility]   Gastroenterology Consultation Note     Carolin Evans  Patient Status:    Inpatient  Date of Admission:         3/31/2021, Hospital day #3  Attending:   Dea Clark MD  PCP:     MD Danielle Ribera that he has been smoking. He has a 56.00 pack-year smoking history. He has never used smokeless tobacco. He reports previous alcohol use. He reports that he does not use drugs.     Allergies:    Penicillins             UNKNOWN    Comment:Has received and to 5' 4\" (1.626 m), weight 133 lb 9.6 oz (60.6 kg), SpO2 94 %. Body mass index is 22.93 kg/m².     General:awake, cooperative, no acute distress  HEENT: EOMI, no scleral icterus, MMM; oral pharnyx is without exudates or lesions  Neck: no lymphadenopathy; thyr Dictated by (CST): Albert Rust MD on 3/31/2021 at 3:47 PM     Finalized by (CST): Albert Rust MD on 3/31/2021 at 3:49 PM            0362 David Patino is a 67year old man with history of BMI 25, diabetes, hyperlipidemia, tobacco us intervention [i.e. Biopsy, dilatation, control of bleeding, etc.] as indicated.     Colonoscopy consent: I have discussed the risks, benefits, and alternatives to colonoscopy with the patient [who demonstrated understanding], including but not limited to th

## 2021-04-03 NOTE — PROGRESS NOTES
[facility] HOSP - [facility]    Progress Note    Flor Cortes Patient Status:  Inpatient    3/26/1945 MRN [id number]   Location [facility] 3W/SW Attending Jai Lauren MD   Hosp Day # 3 PCP Axel Michael MD     CARDIOLOGY ATTEND Neurologic: Alert and oriented, normal affect. No motor or coordinational deficit. Skin: Warm and dry. Cardiac: RRR. S1, S2 normal.  2/6 early KELSIE.   Telemetry - a/v sequential pacing, NSR, rates 70-80's  Lungs: Clear without wheezes, rales, rhonchi MG 2.1 04/01/2021    PHOS 2.6 01/06/2021    TROP <0.045 03/31/2021       XR VIDEO SWALLOW (CPT=74230)    Result Date: 4/1/2021  CONCLUSION:  1. Evidence of penetration but no obvious aspiration. 2.          A full report from speech pathology to follow.

## 2021-04-03 NOTE — PROGRESS NOTES
[facility]    Progress Note    [facility] Jonathan Patient Status:  Inpatient    3/26/1945 MRN [id number]   Location [facility] 3W/SW Attending Chantelle Wynn MD   Hosp Day # 3 PCP Nimco Heart MD       SUBJECTIVE: (ZXN=[id number])    Result Date: 4/1/2021  CONCLUSION:  1. Evidence of penetration but no obvious aspiration. 2.          A full report from speech pathology to follow. Dictated by (CST): Karen Armendariz MD on 4/01/2021 at 12:08 PM     Finalized by (CST):  Wa SL tab 0.4 mg, 0.4 mg, Sublingual, Q5 Min PRN  acetaminophen (TYLENOL) tab 650 mg, 650 mg, Oral, Q6H PRN  Insulin Aspart Pen (NOVOLOG) 100 UNIT/ML flexpen 1-7 Units, 1-7 Units, Subcutaneous, TID CC          Assessment  Patient Active Problem List:     Over plavix (discussed with cards)  - GI consulted - discussed with Dr Dill Headings  - monitor hgb   - further recs pending GI eval    Possible aspiration pna  - procalcitonin normal  - started zosyn as patient was coughing up yellowish sputum and cxr could not ex

## 2021-04-04 PROCEDURE — 99232 SBSQ HOSP IP/OBS MODERATE 35: CPT | Performed by: INTERNAL MEDICINE

## 2021-04-04 NOTE — PROGRESS NOTES
[facility]     Gastroenterology Progress Note    Josephine Graves Patient Status:  Inpatient    3/26/1945 MRN [id number]   Location [facility] 3W/SW Attending Deacon Herbert MD   [address] Day # 4 PCP Dinorah Webster on 4/03/2021 at 11:54 AM     Finalized by (CST): Francisca Warren MD on 4/03/2021 at 11:59 AM                  Assessment and Plan:   Buzz Sanders is a 67year old man with history of BMI 25, diabetes, hyperlipidemia, tobacco use, afib on antico Gastroenterology  4/4/2021

## 2021-04-04 NOTE — CM/SW NOTE
SW following for discharge planning. Per chart review, plan is GINNY vs HH. SW contacted patient via room phone to discuss discharge planning.  Patient declined GINNY at this time and reports that he has been to GINNY in the past and it has not been beneficial

## 2021-04-04 NOTE — PLAN OF CARE
Problem: Patient Centered Care  Goal: Patient preferences are identified and integrated in the patient's plan of care  Description: Interventions:  - What would you like us to know as we care for you?  I live at an extended stay [facility] in [address] [place], [place] [address]y [address] that affect risk of falls.   - [place] fall precautions as indicated by assessment.  - Educate pt/family on patient safety including physical limitations  - Instruct pt to call for assistance with activity based on assessment  - Modify environment to redu Spirometry  - Assess the need for suctioning and perform as needed  - Assess and instruct to report SOB or any respiratory difficulty  - Respiratory Therapy support as indicated  - Manage/alleviate anxiety  - Monitor for signs/symptoms of CO2 retention  Fernanda Donis updated and pt will do tap water enemas tomorrow, awaiting orders. Tentative EGD/Colonoscopy tomorrow. Pt did work with speech today.

## 2021-04-04 NOTE — PROGRESS NOTES
[facility]    Progress Note    Matt Diana Patient Status:  Inpatient    3/26/1945 MRN [id number]   Location [facility] 3W/SW Attending Selina Poole MD   Hosp Day # 4 PCP Ramya Nunez MD       SUBJECTIVE: 4/1/2021  CONCLUSION:  1. Evidence of penetration but no obvious aspiration. 2.          A full report from speech pathology to follow. Dictated by (CST): Bassam Sales MD on 4/01/2021 at 12:08 PM     Finalized by (CST):  Bassam Sales MD on 4/01/2 long-term current use of insulin (HCC)     Multinodular thyroid     History of stroke with residual deficit     Homonymous hemianopsia following cerebrovascular accident     BMI 25.0-25.9,adult     History of colonoscopy with polypectomy     Need for 23-po increased to 400 per cardiology  -Continue metoprolol for rate control  - xarelto held  -Telemetry monitoring      Type 2 diabetes mellitus   - Monitoring Blood glucose with POC checks  - SSI to cover hyperglycemia  - Hypoglycemia protocol  - Will monitor

## 2021-04-04 NOTE — PLAN OF CARE
Pain medication given for headache with relief. Complaints of pain on bottom left heel. Skin is intact with blanchable erythema - heel protecting boots applied. Converted to A.fib around 02:20 - HR controlled and asymptomatic.  Pt has ICD pacemaker implante Medications as prescribed  - Labs, procedures, and imaging as ordered  - See additional Care Plan goals for specific interventions  Outcome: Progressing     Problem: SAFETY ADULT - FALL  Goal: Free from fall injury  Description: INTERVENTIONS:  - Assess pt changes in respiratory status  - Assess for changes in mentation and behavior  - Position to facilitate oxygenation and minimize respiratory effort  - Oxygen supplementation based on oxygen saturation or ABGs  - Provide Smoking Cessation handout, if applic toileting routine/schedule  - Consider collaborating with pharmacy to review patient's medication profile  Outcome: Progressing

## 2021-04-04 NOTE — SLP NOTE
SPEECH DAILY NOTE - INPATIENT    ASSESSMENT & PLAN   ASSESSMENT  PPE: DROPLET MASK AND GLOVES. HAND SANITIZED UPON ENTRANCE/EXIT.      Dysphagia service to complete diet texture analysis of current recommendations, train carry-over of aspiration precautions Completed on 4/1/21  Met   Goal #2 The patient will tolerate GROUND consistency and NECTAR THICK liquids without overt signs or symptoms of aspiration with 100 % accuracy over 1-2 session(s).   Pt tolerating trials of ground/nectar thick liquids with thro Jeremias Yo MS CCC-SLP/L  Speech Language Pathologist  EXT [id number]

## 2021-04-05 PROCEDURE — 99232 SBSQ HOSP IP/OBS MODERATE 35: CPT | Performed by: NURSE PRACTITIONER

## 2021-04-05 NOTE — ANESTHESIA PREPROCEDURE EVALUATION
Anesthesia PreOp Note    HPI:     Ten Ocampo is a 67year old male who presents for preoperative consultation requested by: Joy Vargas MD    Date of Surgery: 3/31/2021 - 4/5/2021    Procedure(s):  ESOPHAGOGASTRODUODENOSCOPY (EGD)  Katie Pump colonoscopy with polypectomy         Date Noted: 07/19/2017      Homonymous hemianopsia following cerebrovascular accident         Date Noted: 03/24/2017      Controlled type 2 diabetes mellitus with microalbuminuria, without long-term current use of insul MD Vik, 45 mg at 06/10/19 1027  leuprolide (LUPRON) depot injection 45 mg, 45 mg, Subcutaneous, Q6 Months, Juan Abel MD, 45 mg at 11/26/18 1007    acetaminophen 325 MG Oral Tab, Take 650 mg by mouth every 6 (six) hours as needed for Pain., Dis tablet, Rfl: 0, Unknown at Unknown time  ATORVASTATIN 20 MG Oral Tab, TAKE 1 TABLET(20 MG) BY MOUTH EVERY NIGHT, Disp: 30 tablet, Rfl: 0, Unknown at Unknown time  Glucose Blood (RA TRUETEST TEST) In Vitro Strip, To check sugars once daily, Disp: 100 strip, MD  Atropine Sulfate 0.1 MG/ML injection 0.5 mg, 0.5 mg, Intravenous, PRN, Huong Schmitt MD  nitroGLYCERIN (NITROSTAT) SL tab 0.4 mg, 0.4 mg, Sublingual, Q5 Min PRN, Beba Saldana MD  acetaminophen (TYLENOL) tab 650 mg, 650 mg, Oral, Q6H PRN, S Last Year:       Ran Out of Food in the Last Year:   Transportation Needs:       Lack of Transportation (Medical):       Lack of Transportation (Non-Medical):   Physical Activity:       Days of Exercise per Week:       Minutes of Exercise per Session:   [facility] and lower dentures    Pulmonary    (+) shortness of breath,   Cardiovascular   Exercise tolerance: poor  (+) hypertension, CHF,     Rhythm: irregular  ROS comment: Rhythm: irregular  Rate: normal  ROS comment: Per cardiology:  1) VT/VF arrest: Documented V

## 2021-04-05 NOTE — PLAN OF CARE
Clear BM this AM, then enema done prior to EGD. Supplemental O2. IV abx for possible PNA. Per SW pt declining GINNY at discharge.     Problem: Patient Centered Care  Goal: Patient preferences are identified and integrated in the patient's plan of care  Marjorie injury  Description: INTERVENTIONS:  - Assess pt frequently for physical needs  - Identify cognitive and physical deficits and behaviors that affect risk of falls.   - [place] fall precautions as indicated by assessment.  - Educate pt/family on patient sa Provide Smoking Cessation handout, if applicable  - Encourage broncho-pulmonary hygiene including cough, deep breathe, Incentive Spirometry  - Assess the need for suctioning and perform as needed  - Assess and instruct to report SOB or any respiratory diff

## 2021-04-05 NOTE — ANESTHESIA POSTPROCEDURE EVALUATION
Patient: Ten Ocampo    Procedure Summary     Date: 04/05/21 Room / Location: [facility] ENDOSCOPY 01 / [facility] ENDOSCOPY    Anesthesia Start: 0847 Anesthesia Stop: 0853    Procedure: COLONOSCOPY (N/A ) Diagnosis:       Hematochezia      (polyps, diverticulosis.

## 2021-04-05 NOTE — H&P
History & Physical Examination    Patient Name: Travis Kelley  MRN: [id number]  CSN: [id number]  YOB: 1945    Diagnosis: rectal bleeding    leuprolide (LUPRON) depot injection 45 mg, 45 mg, Subcutaneous, Q6 Months, Krystal Robles MD, 4 mouth daily. , Disp: 30 tablet, Rfl: 0, Unknown at Unknown time  Rivaroxaban 20 MG Oral Tab, Take 1 tablet (20 mg total) by mouth daily with food. (Patient taking differently: Take 20 mg by mouth daily with food.  Taking in evening ), Disp: 30 tablet, Rfl: 0 Oral, Q6H PRN  Insulin Aspart Pen (NOVOLOG) 100 UNIT/ML flexpen 1-7 Units, 1-7 Units, Subcutaneous, TID CC        Allergies:   Penicillins             UNKNOWN    Comment:Has received and tolerated Zosyn             (Piperacillin/Tazobactam), Cefazolin, and limited given his inability to take a bowel prep due to speech therapy recommendations and inability to tolerate an NGT that was attempted several times yesterday.  Also discussed plan to defer upper endoscopy at this time given lack of suspicion for an upp

## 2021-04-05 NOTE — OR PREOP
Spoke to Eva Dill from [employer] at [employer] to [employer] was magnet approved. Once magnet is placed on device, it will beep once, and when cautery is completed and magnet is removed device will go back to original settings.

## 2021-04-05 NOTE — PLAN OF CARE
PT vss overnight. 1L O2 NC. Afib, xarelto on hold for procedure. NPO since midnight. Accucheck q6hr. Pt still having blood in Bm's. Plan for EGD/ colonoscopy today. Enema x1 performed.    Problem: Patient Centered Care  Goal: Patient preferences are identif Problem: SAFETY ADULT - FALL  Goal: Free from fall injury  Description: INTERVENTIONS:  - Assess pt frequently for physical needs  - Identify cognitive and physical deficits and behaviors that affect risk of falls.   - [place] fall precautions as indica supplementation based on oxygen saturation or ABGs  - Provide Smoking Cessation handout, if applicable  - Encourage broncho-pulmonary hygiene including cough, deep breathe, Incentive Spirometry  - Assess the need for suctioning and perform as needed  - Ass

## 2021-04-05 NOTE — PHYSICAL THERAPY NOTE
PHYSICAL THERAPY TREATMENT NOTE - INPATIENT     Room Number: 643/597-X       Presenting Problem: CHF, difficulty breathing    Problem List  Principal Problem:    Acute on chronic congestive heart failure, unspecified heart failure type ([facility]  Active Proble O2 WALK                  AM-PAC '6-Clicks' INPATIENT SHORT FORM - BASIC MOBILITY  How much difficulty does the patient currently have. ..  -   Turning over in bed (including adjusting bedclothes, sheets and blankets)?: A Little   -   Sitting down on and home environment   Goal #4   Current Status ongoing   Goal #5 Patient to demonstrate independence with home activity/exercise instructions provided to patient in preparation for discharge.    Goal #5   Current Status ongoing

## 2021-04-05 NOTE — PROGRESS NOTES
[facility]    Progress Note    Awa Silva Patient Status:  Inpatient    3/26/1945 MRN [id number]   Location [facility] 3W/SW Attending Ike Crockett MD   Hosp Day # 5 PCP Rob Hayes MD       SUBJECTIVE: 0.9 % 10 mL IV push, 40 mg, Intravenous, Q24H  ondansetron HCl (ZOFRAN) injection 4 mg, 4 mg, Intravenous, Q6H PRN  Piperacillin Sod-Tazobactam So (ZOSYN) 3.375 g in dextrose 5% 100 mL IVPB-ADDV, 3.375 g, Intravenous, Q8H  amiodarone HCl (PACERONE) tab 400 Optic nerve atrophy     Cataract of both eyes     Aortic atherosclerosis (HCC)     Normocytic anemia     NPDH (new persistent daily headache)     Encounter for colorectal cancer screening     V-tach [facility])     Cardiac arrest [facility])     Atrial fibrillation, reported rectal exam/FOBT negative in the ED  -transfused 1 unit PRBC 3/31  -s/p colonoscopy, with multiple lf colon clean basednon-bleeding ulcerations,mild non-bleeding radition proctopathy  -poor prep rt.  Colon  -plan bowel regimen  -resume xarelto x 24

## 2021-04-05 NOTE — PROGRESS NOTES
[facility]     Progress Note    Awa Silva Patient Status:  Inpatient    3/26/1945 MRN [id number]   Location [facility] 3W/SW Attending Juanpablo Page MD   Hosp Day # 4 PCP Tommy Danielle MD     Assessment:    1 280.0 04/04/2021     Lab Results   Component Value Date    INR 1.18 01/10/2021     Lab Results   Component Value Date     04/04/2021    K 4.1 04/04/2021     04/04/2021    CO2 34.0 04/04/2021    BUN 18 04/04/2021    CREATSERUM 1.33 04/04/2021

## 2021-04-05 NOTE — OPERATIVE REPORT
Colonoscopy Report    Jessika Holly     3/26/1945 Age 67year old   PCP Cliff Lopez MD Endoscopist Ramya Fernandez MD     Date of procedure: 21    Procedure: Colonoscopy w/ biopsy    Pre-operative diagnosis: rectal bleeding There were no immediate postoperative complications. The patient’s vital signs were monitored throughout the procedure and remained stable.     Estimated blood loss: insignificant    Specimens collected:  Colon and rectal ulcer biopsies    Complications: no active bleeding    Recommend:  1. Await pathology. 2. Advance diet as tolerated  3. Monitor hgb and transfuse as needed  4. Bowel regimen including fiber and miralax  5. There is no absolute contraindication to anticoagulation or anti-platelet therapy.  Shayla Pena

## 2021-04-05 NOTE — PROGRESS NOTES
[facility]    Progress Note    Josephine Graves Patient Status:  Inpatient    3/26/1945 MRN [id number]   Location [facility] 3W/SW Attending Deacon Herbert MD   Hosp Day # 5 PCP Dinorah Webster MD     CARDIOLOGY ATTE 4/03/2021 at 11:54 AM     Finalized by (CST): Michelle Warren MD on 4/03/2021 at 11:59 AM                Assessment & Plan:   Acute HFrEF-   -echo EF improved from January now 40-45% NWM- mod AS  -neg fluid balance- weight down cr stable- IV diuret

## 2021-04-06 PROCEDURE — 99232 SBSQ HOSP IP/OBS MODERATE 35: CPT | Performed by: INTERNAL MEDICINE

## 2021-04-06 NOTE — DISCHARGE PLANNING
Patient was provided with discharge instructions, education, and follow up information.  Prescriptions were already sent electronically to patient's pharmacy; patient was instructed to purchase thickener powder to thicken all liquids to nectar thick consist

## 2021-04-06 NOTE — PLAN OF CARE
Pt vss overnight. No blood in Bms overnight. Ulcer biopsies taken from egd/ colonoscopy. Holding anticoags for 24 after procedure. Ground and NTL diet. Accuchecks ACHS. Plan to discharge to [facility] when cleared.    Problem: Patient Centered Care  Goal: Patient interventions  Outcome: Not Progressing     Problem: SAFETY ADULT - FALL  Goal: Free from fall injury  Description: INTERVENTIONS:  - Assess pt frequently for physical needs  - Identify cognitive and physical deficits and behaviors that affect risk of fall oxygenation and minimize respiratory effort  - Oxygen supplementation based on oxygen saturation or ABGs  - Provide Smoking Cessation handout, if applicable  - Encourage broncho-pulmonary hygiene including cough, deep breathe, Incentive Spirometry  - Asses medication profile  Outcome: Not Progressing

## 2021-04-06 NOTE — PHYSICAL THERAPY NOTE
PHYSICAL THERAPY TREATMENT NOTE - INPATIENT     Room Number: 752/665-V       Presenting Problem: CHF, difficulty breathing    Problem List  Principal Problem:    Acute on chronic congestive heart failure, unspecified heart failure type ([facility].)  Active Proble Static Sitting: Good  Dynamic Sitting: Good           Static Standing: Fair +  Dynamic Standing: Fair    ACTIVITY TOLERANCE  Pulse: 63  Heart Rate Source: Goal #1   Current Status NT   Goal #2 Patient is able to demonstrate transfers Sit to/from Stand at assistance level: modified independent with walker - rolling     Goal #2  Current Status CGA with RW   Goal #3 Patient is able to ambulate 200 feet with a

## 2021-04-06 NOTE — PROGRESS NOTES
[facility]    Progress Note    Andrew Last Patient Status:  Inpatient    3/26/1945 MRN [id number]   Location [facility] 3W/SW Attending Mellissa Lopez MD   Hosp Day # 6 PCP Karly Camarillo MD     Patient seen an 143 04/06/2021    K 4.3 04/06/2021     04/06/2021    CO2 31.0 04/06/2021    GLU 93 04/06/2021    CA 8.3 (L) 04/06/2021    ALB 2.5 (L) 04/01/2021    ALKPHO 113 04/01/2021    BILT 0.5 04/01/2021    TP 5.5 (L) 04/01/2021    AST 7 (L) 04/01/2021    ALT 1

## 2021-04-06 NOTE — PROGRESS NOTES
[facility]     Gastroenterology Progress Note    Carolin Evans Patient Status:  Inpatient    3/26/1945 MRN [id number]   Location [facility] 3W/SW Attending Effie Manzo MD   [address] Day # 6 PCP Rolando Jorge lamina propria fibrosis  · See comment     B.  Rectum ulcer; biopsy:  · Colonic mucosa with superficial erosion with underlying crypt sparing, and mild lamina propria fibrosis  · See comment     Comment: These findings may be seen with ischemic colitis or c

## 2021-04-06 NOTE — SLP NOTE
SPEECH DAILY NOTE - INPATIENT    ASSESSMENT & PLAN   ASSESSMENT    Patient seen for dysphagia f/u per plan of care, received alert and upright in bed, afebrile, in NAD, no new CXR.  Per RN, patient tolerates current modified diet, plan for discharge home to purchase/use of thickening agents, ongoing aspiration precautions    Interdisciplinary Communication: Discussed with RN  Recommendations posted at bedside  [facility] - JARROD Score: 4   FCM Impression: Abnormal     GOALS  Goal #1 SLP to complete VFSS to rule out aspirati Upright 90 degrees, Eliminate distractions, Supervision with meals with minimal assistance 100 % of the time across 2 sessions. Patient positioned upright in bed, distractions minimized, no straws utilized.  Patient used slow rate and small bites/sips with

## 2021-04-06 NOTE — CM/SW NOTE
Per chart review, pt has DC order placed for today 4/6. SW confirmed w/ pt's RN/Gisela - pt has been weaned off O2 and should not require at time of DC. RN to notify SW if home O2 becomes needed. LELA updated [facility] nahun/ [facility] of pt's intended DC.      inst

## 2021-04-06 NOTE — DISCHARGE SUMMARY
[facility]    Discharge Summary    El Bhatt Patient Status:  Inpatient    3/26/1945 MRN [id number]   Location [facility] 3W/SW Attending Brenden Andrew MD   Hosp Day # 6 PCP Gregory Hollowya MD     Date of Adm failure, unspecified heart failure type (HCC)     Anemia, unspecified type     Dyspnea, unspecified type     Hematochezia     Rectal bleeding      Reason for Admission:       Physical Exam:   General appearance: alert, appears stated age and cooperative  P ulcerations,mild non-bleeding radition proctopathy  -poor prep rt.  Colon  -plan bowel regimen  -resume xarelto today  -no further bleeding ongoing     Possible aspiration pna  - procalcitonin normal  - started zosyn as patient was coughing up yellowish spu instructions. Take 1 tablet (50 mg total) by mouth 2x Daily(Beta Blocker).    Quantity: 60 tablet  Refills: 2     Rivaroxaban 20 MG Tabs  Commonly known as: Milly Chakraborty  What changed: additional instructions      Take 1 tablet (20 mg total) by mouth daily [phone]   · Amiodarone HCl 400 MG Tabs  · Enalapril Maleate 5 MG Tabs  · Ferrous Sulfate 325 (65 Fe) MG Tabs  · Metoprolol Succinate ER 50 MG Tb24         Follow up Visits:  Follow-up with pcp in 1 week    Follow up Labs: bmp, cbc at heart failure clin

## 2021-04-06 NOTE — PLAN OF CARE
No complaints. Weaned from oxygen. Continue altered texture diet. AC resumed per GI. Discharge information reviewed with patient by discharge leader.     Problem: Patient Centered Care  Goal: Patient preferences are identified and integrated in the patient' Discharge     Problem: SAFETY ADULT - FALL  Goal: Free from fall injury  Description: INTERVENTIONS:  - Assess pt frequently for physical needs  - Identify cognitive and physical deficits and behaviors that affect risk of falls.   - [place] fall precautio oxygenation and minimize respiratory effort  - Oxygen supplementation based on oxygen saturation or ABGs  - Provide Smoking Cessation handout, if applicable  - Encourage broncho-pulmonary hygiene including cough, deep breathe, Incentive Spirometry  - Asses wet/gurgly vocal quality is noted  Outcome: Adequate for Discharge

## 2021-04-07 NOTE — PROGRESS NOTES
NCM attempted to contact patient for hospital follow up. Unable to leave message as mailbox is not set up. NCM sent TE to PCP office re: assistance in scheduling TCM HFU appt.

## 2021-04-07 NOTE — PROGRESS NOTES
NCM attempted to contact the patient for HFU. No answer and unable to leave a message as the mailbox has not been set up yet. NCM will try again another time.

## 2021-04-07 NOTE — TELEPHONE ENCOUNTER
Pt discharged 4-6-21, acute on chronic congestive heart failure. Pt does not have HFU appt scheduled at this time. NCM called twice but pt doesn't have mailbox set up, unable to leave a message. Pt has seen Dr. Emma Carpenter in the office.     TCM/HFU appt alli

## 2021-04-07 NOTE — PAYOR COMM NOTE
--------------  DISCHARGE REVIEW    Simeon Britt [place] [facility] [place]scriber #:  [id number]  Authorization Number: [id number]    Admit date: 3/31/21  Admit time:   6:21 PM  Discharge Date: 4/6/2021  3:58 PM     Admitting Physician: Mellissa Lopez MD  Attending y of colonoscopy with polypectomy     Need for 23-polyvalent pneumococcal polysaccharide vaccine     Elevated PSA     Mixed hyperlipidemia     Prostatic adenocarcinoma ([facility] [address]     Influenza vaccination declined by patient     Optic nerve atrophy     Cataract o creatinine will d/w cards about decreasing diuretics - now stopped  - repeat Echo - EF 40-45%  - ASA, statin, ACEi, and BB.   - Strict I&Os, Daily weights, Fluid restriction  - Cardiology on consult, appreciate recs.   - resume torsemide on dc, plan heart (three) times a week. Stop taking on:  May 7, 2021  Quantity: 12 tablet  Refills: 0        CHANGE how you take these medications      Instructions Prescription details   Amiodarone HCl 400 MG Tabs  Commonly known as: PACERONE  What changed:   · medication torsemide 10 MG Tabs  Commonly known as: DEMADEX      Take 1 tablet (10 mg total) by mouth daily.    Quantity: 30 tablet  Refills: 0        STOP taking these medications    amLODIPine Besylate 10 MG Tabs  Commonly known as: NORVASC        dilTIAZem HCl ER

## 2021-04-08 NOTE — TELEPHONE ENCOUNTER
Enalapril Maleate 10 MG Oral Tab   Ferrous Sulfate 325 (65 Fe) MG Oral Tab  amiodarone HCl 400 MG Oral Tab   aspirin 81 MG Oral Chew Tab   CLOPIDOGREL BISULFATE 75 MG Oral Tab       [id number] [place] - [address] AT [facility]

## 2021-04-08 NOTE — TELEPHONE ENCOUNTER
patient is unable to come in before 04/13  was able to schedule something for 04/15/2021 at 10.40 to be seen by Dr Darion Eduardo.

## 2021-04-12 LAB
BUN SERPL-MCNC: 18 MG/DL
BUN/CREAT SERPL: 16.4
CALCIUM SERPL-MCNC: 9.2 MG/DL
CHLORIDE SERPL-SCNC: 108 MMOL/L
CO2 SERPL-SCNC: 31 MMOL/L
CREAT SERPL-MCNC: 1.1 MG/DL
GLUCOSE SERPL-MCNC: 105 MG/DL
HCT VFR BLD CALC: 27 %
HGB BLD-MCNC: 8 G/DL
PLATELET # BLD: 429 K/MCL
POTASSIUM SERPL-SCNC: 4.6 MMOL/L
RBC # BLD: 3.1 10*6/UL
SODIUM SERPL-SCNC: 143 MMOL/L
WBC # BLD: 4.9 K/MCL

## 2021-04-12 NOTE — PROGRESS NOTES
[address] Patient Status:  No patient class for patient encounter    3/26/1945 MRN [id number]   Location MD Dr. Adarsh Teague Dr. is Component Value Date/Time    WBC 4.9 04/12/2021 12:44 PM    HGB 8.0 (L) 04/12/2021 12:44 PM    HCT 27.0 (L) 04/12/2021 12:44 PM    .0 04/12/2021 12:44 PM    CREATSERUM 1.10 04/06/2021 04:55 AM    BUN 18 04/06/2021 04:55 AM     04/06/2021 04: abnormalities. 2. Aortic valve: There was moderate stenosis. Trivial      regurgitation. Peak velocity (S): 2.94m/sec. Mean gradient (S):      120mm Hg. Valve area (VTI): 0.92cm^2. 3. Mitral valve: Mild regurgitation.    4. Left atrium: The atrium was m not cannulated on last angiogram, possible subtotaled lesion which resulted in VT arrest  -may benefit from repeat angiogram    GI bleed with anemia  -s/p colonoscopy during hospitalization with multiple  -CBC drawn today Hgb stable 8.2 on discharge, and 8 lunch meats, processed meats like hotdogs, sausage, oconnell, pepperoni, soy sauce, pre-packaged rice or potatoes. Please remember to read nutrition labels for sodium content.     www.healthyeating. nhlbi.nih.gov      I spent 60 minutes with this patient gisella

## 2021-04-12 NOTE — PATIENT INSTRUCTIONS
Take a daily weight and write it down    Please look into the [facility] for transportation    Take a look at your medication list and let me know if there is something that doesn't match the meds you are currently taking    Return to [facility]

## 2021-04-15 ENCOUNTER — TELEPHONE (OUTPATIENT)
Dept: CARDIOLOGY | Age: 76
End: 2021-04-15

## 2021-04-15 RX ORDER — CLOPIDOGREL BISULFATE 75 MG/1
75 TABLET ORAL DAILY
Qty: 30 TABLET | Refills: 0 | Status: SHIPPED | OUTPATIENT
Start: 2021-04-15 | End: 2021-04-15 | Stop reason: ALTCHOICE

## 2021-04-15 RX ORDER — ATORVASTATIN CALCIUM 20 MG/1
20 TABLET, FILM COATED ORAL NIGHTLY
Qty: 90 TABLET | Refills: 0 | Status: SHIPPED | OUTPATIENT
Start: 2021-04-15

## 2021-04-15 NOTE — TELEPHONE ENCOUNTER
Spoke to Jake ERICKSON at [facility]. She reviewed patient's chart and confirmed that patient should be taking the clopidogrel, aspirin 81 mg and Xarelto. She will contact Dr Aguilar [facility] office for refills for patient.  Julian also confirmed

## 2021-04-15 NOTE — TELEPHONE ENCOUNTER
Received a call from Melville Nyhan, pharmD with medication discrepancies regarding plavix, asa and amiodarone. Per notes from cardiologist on 4/5/21 and GI 4/5 and 6/21 may continue AC and antiplatelet therapy.     Discussed with Megan Stover RN with Dr. Nan Reed

## 2021-04-15 NOTE — TELEPHONE ENCOUNTER
Reached patient for medication adherence consult. Patient is past due for refill on atorvastatin. He was recently hospitalized and recently seen in the Heart Failure clinic with several medication changes.  He was able to locate all of his medication randa

## 2021-04-15 NOTE — TELEPHONE ENCOUNTER
Attempted to reach patient to discuss medication discrepancies, unable to leave message as VMbox not setup; will try again later.

## 2021-04-16 NOTE — TELEPHONE ENCOUNTER
Reached patient to discuss medications. Patient had NOT spoken to anyone from cardiology office yet. Provided patient with cardiology office number and encouraged him to call them after we hang up.  Let him know they would be sending in his new prescription

## 2021-04-20 NOTE — TELEPHONE ENCOUNTER
[facility] is calling to notify Lamont Morales that physical therapy and occupation therapy are going to be delayed. Please call back with any other questions or concerns.

## 2021-04-21 NOTE — TELEPHONE ENCOUNTER
Rosie Baig, pts friend called stating that the nurse practitioner that went to the house today recommended for pt to take protein shakes and now sukumar wants to know which brand does doctor recommend, Ensure or Boost??/  Please call back and advise

## 2021-04-22 NOTE — TELEPHONE ENCOUNTER
Reached patient this morning to check in on medications. He had an appt today with PCP, however this has been canceled. He tells me he is not feeling well so canceled apt but did not elaborate on symptoms.  Unable to confirm whether he has been in [place]

## 2021-04-23 NOTE — TELEPHONE ENCOUNTER
Spoke with Izabella ROSE and reviewed notes resulted from telephone conversation with cardiology. Plan to continue Xarelto only at this point, so script for Plavix not sent in to pharmacy at this time.

## 2021-04-23 NOTE — TELEPHONE ENCOUNTER
Call to [facility], no new recent prescription sent in for Plavix; last filled May 2020. She did state Xarelto prescription is currently ready to be picked up.

## 2021-04-27 NOTE — TELEPHONE ENCOUNTER
Brother would like to speak to the nurse or to the doctor regarding patients health, Mr. Eliud Robledo states that his brother does not want to  f/u with any doctor, keeps canceling appts, seat on his chair and (per brother) it seems like he just waiting to die.

## 2021-04-28 RX ORDER — ENALAPRIL MALEATE 10 MG/1
10 TABLET ORAL DAILY
COMMUNITY
Start: 2021-04-06

## 2021-04-28 RX ORDER — SPIRONOLACTONE 25 MG/1
TABLET ORAL
COMMUNITY
Start: 2021-04-06

## 2021-04-28 RX ORDER — FERROUS SULFATE 325(65) MG
1 TABLET ORAL
COMMUNITY
Start: 2021-04-06

## 2021-05-03 ENCOUNTER — TELEPHONE (OUTPATIENT)
Dept: CARDIOLOGY | Age: 76
End: 2021-05-03

## 2021-05-07 PROBLEM — I35.0 NONRHEUMATIC AORTIC VALVE STENOSIS: Status: ACTIVE | Noted: 2021-05-07

## 2021-05-07 PROBLEM — I25.10 CORONARY ARTERY DISEASE INVOLVING NATIVE CORONARY ARTERY OF NATIVE HEART WITHOUT ANGINA PECTORIS: Status: ACTIVE | Noted: 2021-05-07

## 2021-05-10 ENCOUNTER — APPOINTMENT (OUTPATIENT)
Dept: CARDIOLOGY | Age: 76
End: 2021-05-10

## 2021-05-18 ENCOUNTER — TELEPHONE (OUTPATIENT)
Dept: CARDIOLOGY | Age: 76
End: 2021-05-18

## 2021-05-18 NOTE — TELEPHONE ENCOUNTER
Can we please try calling Frankie Lee and discuss importance of close monitoring with home health and us and discussed risks of non-compliance which include cardiovascular complications and death. Ultimately its up to him as we can't force him.

## 2021-05-18 NOTE — TELEPHONE ENCOUNTER
Melanie Wolfe , nurse from [facility] called stating that she will be discharging patient for home health services because pt does not answer phone calls and does not answer the door  To please call her back if you have any questions [phone]

## 2021-05-20 NOTE — TELEPHONE ENCOUNTER
We have tried calling patient several times already but unfortunately he is not answering the phone and has no voice mail set up, he had an appointment today to be seen by Dr Kayla Preston but he didn't show.

## 2021-05-28 ENCOUNTER — TELEPHONE (OUTPATIENT)
Dept: CARDIOLOGY | Age: 76
End: 2021-05-28

## 2021-07-04 PROBLEM — D50.8 OTHER IRON DEFICIENCY ANEMIA: Status: ACTIVE | Noted: 2021-01-01

## 2021-07-04 PROBLEM — I48.91 ATRIAL FIBRILLATION WITH RAPID VENTRICULAR RESPONSE (HCC): Status: ACTIVE | Noted: 2021-01-01

## 2021-07-04 PROBLEM — E87.0 HYPERNATREMIA: Status: ACTIVE | Noted: 2021-01-01

## 2021-07-04 NOTE — H&P
[address]  : 3/26/1945    Status: Emergency  Day #: 0    Attending: Federica Nicholsa MD  PCP: Pedro Pablo Ann MD     Date of Encounter:  2021  Date of Admission:  2021     Chief Compla Use      Smoking status: Current Every Day Smoker        Packs/day: 1.00        Years: 56.00        Pack years: 64      Smokeless tobacco: Never Used    Vaping Use      Vaping Use: Never used    Alcohol use: Not Currently      Comment: rarely    Drug use: TEST) In Vitro Strip,  To check sugars once daily     Review of Systems     A comprehensive 12 point review of systems was negative. See History of Present Illness for other pertinent details.      Physical Exam     Temp:  [99.3 °F (37.4 °C)] 99.3 °F (37.4 and monitor    DM2 with associated retinopathy  -montior BS  -cover ISS    Chronic normocytic anemia  -hgb higher than baseline  -monitor    Mild hypernatremia  -bmp in am    DVT proph:  xarelto    confirmed DNAR/selective with patient         **Certificat

## 2021-07-04 NOTE — ED INITIAL ASSESSMENT (HPI)
Pt arrived per EMS from home. States has been having shortness of breath but has gotten worse. Per EMS O2 at RA 82%, placed on 4L 96%. Pt was recently taken of O2 at home. States has history of Afib. Pt has edema to bilateral feet.

## 2021-07-04 NOTE — ED PROVIDER NOTES
Patient Seen in: [facility] AND [facility] Emergency Department      History   Patient presents with:  Difficulty Breathing    Stated Complaint: sob    HPI/Subjective:   HPI    Patient is a 58-year-old male with a history of atrial fibrillation, CHF hypertension Systems    Positive for stated complaint: sob  Other systems are as noted in HPI. Constitutional and vital signs reviewed. All other systems reviewed and negative except as noted above.     Physical Exam     ED Triage Vitals [07/04/21 1417]   /7 Psychiatric:         Judgment: Judgment normal.     Differential diagnosis includes CHF, pneumonia, cute coronary syndrome          ED Course     Labs Reviewed   BASIC METABOLIC PANEL (8) - Abnormal; Notable for the following components:       Result Bhavana RAINBOW DRAW LAVENDER   RAINBOW DRAW LIGHT GREEN   RAINBOW DRAW GOLD   RAINBOW DRAW BLUE   URINE CULTURE, ROUTINE     EKG    Rate, intervals and axes as noted on EKG Report.   Rate: 118  Rhythm: Atrial Fibrillation  Reading: Atrial fibrillation with rapid Disposition:  Admit  7/4/2021  3:30 pm    Follow-up:  No follow-up provider specified.   We recommend that you schedule follow up care with a primary care provider within the next three months to obtain basic health screening including reassessment of y

## 2021-07-04 NOTE — ED QUICK NOTES
Patient noted to have urinated on himself. Cleaned ED cart, provided clean linens and gown and provided fox care.

## 2021-07-04 NOTE — PLAN OF CARE
Pt here for afib w/rvr. Pt lives at the [facility] (extended stay [facility]) alone. He said his brother helps take care of him. Brothers name is Ayse Cervantes. Pt stated he has not taken any of his medications in 3 months (last admitted here in April).  Maxx Evaluate effectiveness of vasoactive medications to optimize hemodynamic stability  - Monitor arterial and/or venous puncture sites for bleeding and/or hematoma  - Assess quality of pulses, skin color and temperature  - Assess for signs of decreased corona and physical deficits and behaviors that affect risk of falls.   - [place] fall precautions as indicated by assessment.  - Educate pt/family on patient safety including physical limitations  - Instruct pt to call for assistance with activity based on asse

## 2021-07-04 NOTE — ED QUICK NOTES
Orders for admission, patient is aware of plan and ready to go upstairs. Any questions, please call ED ALEJANDRINA Arreaga at extension [phone].      Type of COVID test sent: rapid  COVID Suspicion level: Low    Titratable drug(s) infusing: dilTIAZem  Rate: 10 mg/hr

## 2021-07-05 NOTE — CM/SW NOTE
07/05/21 1000   CM/SW Referral Data   Referral Source Physician   Reason for Referral   (Advanced Directives, POLST, [place] orders)   Informant Patient   Patient Info   Advanced directives?  Yes   Patient's Mental Status Alert;Oriented   Patient's Home Envir signature. SW sent [facility] referrals via Aidin for review. HH orders/F2F entered and sent. Will need f/up to provide list and obtain choice from pt.     UPDATE at 12:50PM: Received notice from Jenae garnica/ [facility] - pt has been accepted and has chosen [facility]

## 2021-07-05 NOTE — PHYSICAL THERAPY NOTE
PHYSICAL THERAPY EVALUATION - INPATIENT     Room Number: 310/310-A  Evaluation Date: 7/5/2021  Type of Evaluation: Initial   Physician Order: PT Eval and Treat    Presenting Problem: CHF  Reason for Therapy: Mobility Dysfunction and Discharge Planning Discharge Recommendations: Sub-acute rehabilitation    PLAN  PT Treatment Plan: Bed mobility; Body mechanics; Patient education; Energy conservation; Endurance;Gait training;Transfer training;Balance training;Strengthening  Rehab Potential : Good  Frequency (O ambulation with rolling walker prior to admission to the hospital.     Fina Marroquin  \"I have this cough\"    PHYSICAL THERAPY EXAMINATION     OBJECTIVE  Precautions: None  Fall Risk: High fall risk    WEIGHT BEARING RESTRICTION  Weight Bearing Restriction: to/from Stand at assistance level: supervision with walker - rolling     Goal #2  Current Status    Goal #3 Patient is able to ambulate 100 feet with assist device: walker - rolling at assistance level: supervision   Goal #3   Current Status    Goal #4

## 2021-07-05 NOTE — OCCUPATIONAL THERAPY NOTE
OCCUPATIONAL THERAPY EVALUATION - INPATIENT     Room Number: 310/310-A  Evaluation Date: 7/5/2021  Type of Evaluation: Initial  Presenting Problem:  (palpitations, SOB, elevated HR)    Physician Order: IP Consult to Occupational Therapy  Reason for Therapy patient in role of OT and POC   • Graded therapeutic activity to challenge current functional status    The patient's Approx Degree of Impairment: 66.57% has been calculated based on documentation in the [facility] '6 clicks' Inpatient Daily Activity Short Form COLONOSCOPY N/A 4/5/2021    Procedure: COLONOSCOPY;  Surgeon: Laura Banks MD;  Location: [address] [place] [address] ENDOSCOPY   • EP DUAL CHAMBER ICD  1/22/2021        • OTHER      Critical access hospital       HOME SITUATION  Type of Home:  (Extended Stay [facility])  Home Layout: One level Impairment: 66.57%  Standardized Score (AM-PAC Scale): 30.6  CMS Modifier (G-Code): CL    FUNCTIONAL TRANSFER ASSESSMENT  Supine to Sit : Moderate assistance  Sit to Stand: Minimum assistance  Toilet Transfer: NT  Shower Transfer: NT  Chair Transfer: min a

## 2021-07-05 NOTE — SLP NOTE
ADULT SWALLOWING EVALUATION    ASSESSMENT    ASSESSMENT/OVERALL IMPRESSION:  PPE REQUIRED. THIS THERAPIST WORE GLOVES, DROPLET MASK, AND GOGGLES FOR DURATION OF EVALUATION. HANDS WASHED UPON ENTRANCE/EXIT. SLP BSSE orders received and acknowledged.  SHAHANA saez liquids/ Mildly thick (no straws)         Compensatory Strategies Recommended: No straws; Slow rate; Alternate consistencies;Small bites and sips;Multiple swallows  Aspiration Precautions: Upright position; Slow rate;Small bites and sips; No straw  Medication MD RADHA on 7/04/2021 at 2:51 PM       Finalized by (CST): Andres Boxer, MD on 7/04/2021 at 2:51 PM       OBJECTIVE   ORAL MOTOR EXAMINATION  Dentition: Edentulous  Symmetry: Within Functional Limits  Strength:  (reduced)  Tone: Within Functional Limits  Ran 1  Follow Up Needed (Documentation Required): Yes  SLP Follow-up Date: 07/06/21    Thank you for your referral.   If you have any questions, please contact Michele Hines  Speech Language Pathologist  Phone Number [phone]

## 2021-07-05 NOTE — PLAN OF CARE
Rico gtt, SLP eval currently on modified diet, VSS, will continue to monitor  Problem: Patient Centered Care  Goal: Patient preferences are identified and integrated in the patient's plan of care  Description: Interventions:  - What would you like us to kn indicated by assessment.  - Educate pt/family on patient safety including physical limitations  - Instruct pt to call for assistance with activity based on assessment  - Modify environment to reduce risk of injury  - Provide assistive devices as appropriat

## 2021-07-05 NOTE — HOME CARE LIAISON
Patient provided with list of Leo Bustos providers from [facility], patient choice is [address]. Agency reserved in [facility] and contact information placed on AVS.   Financial interest disclosure provided to patient.

## 2021-07-05 NOTE — CONSULTS
[facility]    Cardiology Consultation  [address] [facility] Patient Status:  Inpatient    3/26/1945 MRN [id number]   Location [facility] 3W/SW Attending Wendy Merlos MD   [facility] Day # 1 PCP Vaishnavi Schroeder attack [facility])    • High blood pressure    • HTN (hypertension), benign 3/9/2017   • No diabetic retinopathy in both eyes    • [facility] 04/16/2018   • PSVT (paroxysmal supraventricular tachycardia) ([facility] 3/9/2017    S/p SVT ablation 10-31-17 PRN   Or  glucose (DEX4) oral liquid 30 g, 30 g, Oral, Q15 Min PRN   Or  Glucose-Vitamin C (DEX-4) chewable tab 8 tablet, 8 tablet, Oral, Q15 Min PRN  acetaminophen (TYLENOL) tab 650 mg, 650 mg, Oral, Q6H PRN  temazepam (RESTORIL) cap 15 mg, 15 mg, Oral, N Take 1 tablet (10 mg total) by mouth daily. (Patient not taking: Reported on 7/4/2021 )  tamsulosin (FLOMAX) cap, Take 0.4 mg by mouth daily.    (Patient not taking: Reported on 7/4/2021 )  Rivaroxaban 20 MG Oral Tab, Take 1 tablet (20 mg total) by mouth da (1.626 m) 145 lb (65.8 kg)     Intake/Output:   Last 3 shifts:   Intake/Output                 07/03/21 0700 - 07/04/21 0659 (Not Admitted) 07/04/21 0700 - 07/05/21 0659 07/05/21 0700 - 07/06/21 4671       Intake    Total Intake -- -- --       Output    Ur TP 5.5 (L) 04/01/2021    AST 7 (L) 04/01/2021    ALT 12 (L) 04/01/2021    PTT 34.0 01/09/2021    INR 1.18 01/10/2021    PTP 14.8 (H) 01/10/2021    T4F 0.9 01/04/2021    TSH 0.051 (L) 01/04/2021    PSA <0.01 12/06/2019    MG 1.8 07/05/2021    PHOS 2.6 01/06 Pericardium, extracardiac: There was a large left pleural      effusion.      [address] [facility] 1/7/2021  Cardiologist: Sana Urbina MD   Primary Proceduralist: Sana Urbina MD   Procedure Performed: [facility]   Date of Procedure: 1/7/2021   Indication: Cardiac arrest. primary service    Thank you for allowing me to participate in the care of your patient.     Carol Castellanos MD  [address] [place] [address]  7/5/2021

## 2021-07-05 NOTE — PROGRESS NOTES
[facility]  Progress Note     Quang Warren  : 3/26/1945    Status: Inpatient  Day #: 1    Attending: Donny Guillen MD  PCP: Deondre Villanueva MD      Assessment and Plan     Acute on chronic systolic CHF  CAD  H/o cardiac arrenst MCHC 30.6* 30.9*   RDW 21.8* 21.4*   NEPRELIM 5.80 5.32     Recent Labs   Lab 07/04/21  1422 07/04/21  1701 07/05/21  0514   BUN 13  --  15   CREATSERUM 0.73  --  0.76   GFRAA 103  --  102   GFRNAA 89  --  88   CA 8.9  --  8.4*   *  --  145   K 4.2

## 2021-07-06 NOTE — PLAN OF CARE
Controlled afib. Continue IV amiodarone. Plan to transition to oral amiodarone today.      Problem: Patient Centered Care  Goal: Patient preferences are identified and integrated in the patient's plan of care  Description: Interventions:  - What would you l 12 lead EKG if indicated  - Evaluate effectiveness of antiarrhythmic and heart rate control medications as ordered  - Initiate emergency measures for life threatening arrhythmias  - Monitor electrolytes and administer replacement therapy as ordered  Outcom Discharge to home or other facility with appropriate resources  Description: INTERVENTIONS:  - Identify barriers to discharge w/pt and caregiver  - Include patient/family/discharge partner in discharge planning  - Arrange for needed discharge resources and

## 2021-07-06 NOTE — PROGRESS NOTES
[facility]  Progress Note     Jeanette Madrid  : 3/26/1945    Status: Inpatient  Day #: 2    Attending: Federica Nicholas MD  PCP: Pedro Pablo Ann MD      Assessment and Plan     Acute on chronic systolic CHF  CAD  H/o cardiac arrenst RDW 21.8* 21.4*   NEPRELIM 5.80 5.32     Recent Labs   Lab 07/04/21  1422 07/04/21  1701 07/05/21  0514 07/06/21  0450   BUN 13  --  15 19*   CREATSERUM 0.73  --  0.76 0.86   GFRAA 103  --  102 97   GFRNAA 89  --  88 84   CA 8.9  --  8.4* 8.7   * minutes, >50% of the time counseling coordinating care. Discussed plan of care.      Zain Aguirre MD

## 2021-07-06 NOTE — TELEPHONE ENCOUNTER
Jolene Nieto from [facility] called asking if doctor will be following his care for home health and sign orders  To please call her back at [phone]

## 2021-07-06 NOTE — PROGRESS NOTES
[address] Patient Status:  Inpatient    3/26/1945 MRN [id number]   Location [facility] 3W/SW Atten 4.8 oz (58.2 kg)  04/12/21 : 132 lb (59.9 kg)      Physical Exam:    General: Alert and oriented x 3,  No apparent distress. HEENT: No focal deficits. Neck: 1+ JVD, carotids 2+ no bruits.   Cardiac: Irregular rhythm, rate controlled, S1, S2 normal, 2/6 ea

## 2021-07-06 NOTE — CM/SW NOTE
Per chart review, PT/OT recommend SNF at time of DC. SW spoke w/ pt this AM and discussed new recommendation. SW explained SNF and inpatient stay for rehab. Pt is currently declining SNF at this time. Pt would like to return home w/ [facility] [place].

## 2021-07-06 NOTE — CARDIAC REHAB
CARDIAC REHAB HEART FAILURE EDUCATION    Handouts provided and reviewed: CHF Booklet. Activity: Chair for all meals: yes       Ambulation: with assist       Tolerated Activity: weak per patient         Disease Process: Disease process reviewed.     Revi

## 2021-07-06 NOTE — PAYOR COMM NOTE
--------------  ADMISSION REVIEW     Cristina Rios MA O  Subscriber #:  [id number]  Authorization Number: [id number]    Admit date: 7/4/21  Admit time:  5:30 PM       Admitting Physician: Shyla Amor MD  Attending Physician:  Shyla Amor MD  Primary Care y history    • VT (ventricular tachycardia) [facility])               Past Surgical History:   Procedure Laterality Date   • CARDIAC DEFIBRILLATOR PLACEMENT     • COLON SURGERY  1997    repair of perforation following polypectomy   • COLONOSCOPY N/A 4/5/2021    Pr Palpations: Abdomen is soft. There is no mass. Tenderness: There is no abdominal tenderness. There is no guarding or rebound. Musculoskeletal:         General: Normal range of motion. Cervical back: Normal range of motion and neck supple. (*)     HGB 9.7 (*)     HCT 31.7 (*)     MCHC 30.6 (*)     RDW-SD 68.0 (*)     RDW 21.8 (*)     Lymphocyte Absolute 0.33 (*)     All other components within normal limits   LIPID PANEL - Normal   RAPID SARS-COV-2 BY PCR - Normal   CBC WITH DIFFERENTIAL WIT (Principal) Acute on chronic congestive heart failure, unspecified heart failure type ([address] I50.9 3/31/2021 Unknown    Hypernatremia E87.0 7/4/2021 Yes                  H&P - H&P Note      H&P signed by Feli Shaffer MD at 7/4/2021  4:24 PM     Author: Vickie Seaman overload with right greater than left pleural effusions.     Dictated by (CST): Anand Fragoso MD on 7/04/2021 at 2:51 PM     Finalized by (CST): Anand Fragoso MD on 7/04/2021 at 2:51 PM           Assessment and Plan     Acute on chronic systolic CHF  CAD Keith Bunch RN    7/5/2021 1800 Given 40 mg Intravenous Patricia Regalado RN      Insulin Aspart Pen (NOVOLOG) 100 UNIT/ML flexpen 1-7 Units     Date Action Dose Route User    7/5/2021 1800 Given 2 Units Subcutaneous (Right Upper Arm) Patricia Regalado, cardiology.      Elevated troponin - no CP  -unclear significance  -not ACS  -cardology consult     HTN  -con't meds and monitor     DM2 with associated retinopathy  -montior BS  -cover ISS     Chronic normocytic anemia  -hgb at baseline  -check iron studi increased LE edema. He is not compliant with sodium or fluid restrictions at home. Does not weigh himself regularly.  No chest pain, dizziness, or synocope     Pro-BNP 20,996  Troponin 0.066 -> 0.052  Hgb 8.7     CXR - vascular congestion, R>L pleural effus consult     HTN  -con't meds and monitor     DM2 with associated retinopathy  -montior BS  -cover ISS     Chronic normocytic anemia  -hgb at baseline  -iron def  -venofer  -monitor  -hemoccult stool     Mild hypernatremia  -bmp in am     DVT proph:  yesenia Resp 22   Ht 5' 4\" (1.626 m)   Wt 128 lb 4.8 oz (58.2 kg)   SpO2 96%   BMI 22.02 kg/m²      Temp (24hrs), Av.6 °F (36.4 °C), Min:97.2 °F (36.2 °C), Max:97.9 °F (36.6 °C)

## 2021-07-06 NOTE — SLP NOTE
SPEECH DAILY NOTE - INPATIENT    ASSESSMENT & PLAN   ASSESSMENT    Pt alert, afebrile and on 2L/min 02 via NC. Pt seen for swallow analysis per BSE recommendations (after consulting with RN).  Pt seen upright in chair with current diet of minced & moist/mil precautions  Interdisciplinary Communication: Discussed with RN      GOALS  Goal #1 The patient will tolerate minced and moist consistency and mildly thick liquids without overt signs or symptoms of aspiration with 100 % accuracy over 1-2 session(s).     No

## 2021-07-07 NOTE — SLP NOTE
ADULT VIDEOFLUOROSCOPIC SWALLOWING STUDY    Admission Date: 7/4/2021  Evaluation Date: 07/07/21  Radiologist: Kasey Benavidez    RECOMMENDATIONS   Diet Recommendations - Solids: NPO  Diet Recommendations - Liquids: NPO    Further Follow-up:  Follow Up Needed (Do Aspiration  Imaging results:     CXR 7/4/21 CONCLUSION:   1.  CHF/fluid overload with right greater than left pleural effusions.       Dictated by (CST): Fadumo Jennings MD on 7/04/2021 at 2:51 PM       Finalized by (CST): Fadumo Jennings MD on 7/04/2021 at 2 - Nectar Thick Liquids): Impaired  Bolus Retrieval (VFSS - Nectar Thick Liquids): Intact  Bilabial Seal (VFSS - Nectar Thick Liquids): Intact  Bolus Formation (VFSS - Nectar Thick Liquids): Impaired  Bolus Propulsion (VFSS - Nectar Thick Liquids):  Impaired (VFSS - Soft Solid): Intact  Bilabial Seal (VFSS - Soft Solid): Intact  Bolus Formation (VFSS - Soft Solid): Impaired  Bolus Propulsion (VFSS - Soft Solid): Impaired  Mastication (VFSS - Soft Solid): Impaired  Retention (VFSS - Soft Solid):  Impaired  Sherryle Beverly with viscosity of bolus, and as intake continued. Deep penetration below VF and returning to LV observed during the swallow with mildly thick liquid wash and soft solid texture.  Aspiration of residuals observed during subsequent swallows post intake of pur and VFSS results/recommendations independently over 2-3 session(s).     In Progress     PLAN: SLP to initiate intensive dysphagia exercise program during acute stay - will complete daily (as able) until discharge to next level of care (pending pt/family dec

## 2021-07-07 NOTE — PROGRESS NOTES
Cardiology addendum    Called now NPO as failed video swallow. Will change oral Metoprolol to IV for now, hold Lipitor Vasotec. Hold Xarelto for now reevaluate tomorrow.     Aubree Monahan, NP

## 2021-07-07 NOTE — PROGRESS NOTES
[address] Patient Status:  Inpatient    3/26/1945 MRN [id number]   Location [facility] 3W/SW Atten at 7/7/2021 1126  Last data filed at 7/7/2021 3195  Gross per 24 hour   Intake 855.51 ml   Output 1575 ml   Net -719.49 ml       Wt Readings from Last 2 Encounters:  07/07/21 : 128 lb 4.8 oz (58.2 kg)  04/12/21 : 132 lb (59.9 kg)      Physical Exam:    Gen

## 2021-07-07 NOTE — SLP NOTE
SPEECH DAILY NOTE - INPATIENT    ASSESSMENT & PLAN   ASSESSMENT  PPE: DROPLET MASK AND GLOVES. HAND SANITIZED UPON ENTRANCE/EXIT. Dysphagia service: Pt alert and upright at bedside.  SLP provided education to pt regarding results/recommendations of VFSS function. Family not present for tx session.     In Progress      FOLLOW UP  Follow Up Needed (Documentation Required): Yes  SLP Follow-up Date: 07/08/21  Number of Visits to Meet Established Goals: 3    Session: 1 following VFSS.     If you have any questi

## 2021-07-07 NOTE — SLP NOTE
SPEECH DAILY NOTE - INPATIENT    ASSESSMENT & PLAN   ASSESSMENT    PPE REQUIRED. THIS THERAPIST WORE GLOVES AND DROPLET MASK FOR DURATION OF TX SESSION. HANDS WASHED UPON ENTRANCE/EXIT.      SLP f/u for ongoing meal assessment per recommendations of veda CXR on 7/4/21, resulted in right > left pleural effusions. Recommend VFSS to better assess pharyngeal function. Collaborated swallow plan of care with RN. VFSS pending MD orders. CXR 7/04/21:  CONCLUSION:   1.  CHF/fluid overload with right greater compensatory strategies as outlined by  BSSE (clinical evaluation) including Slow rate, Small bites, Small sips, Multiple swallows, Alternate liquids/solids, No straws, Upright 90 degrees, Eliminate distractions with minimal assistance 100 % of the time ac

## 2021-07-07 NOTE — PHYSICAL THERAPY NOTE
Attempted to see patient for physical therapy session. Patient had been up in bedside chair, and just got back to bed and declined participation in physical therapy. Will attempt to see patient tomorrow for physical therapy session. RN aware.

## 2021-07-07 NOTE — PLAN OF CARE
Problem: Patient Centered Care  Goal: Patient preferences are identified and integrated in the patient's plan of care  Description: Interventions:  - What would you like us to know as we care for you?  I have not taken my medications in 3 months  - Provid pre-medicate as appropriate  Outcome: Progressing     Problem: RISK FOR INFECTION - ADULT  Goal: Absence of fever/infection during anticipated neutropenic period  Description: INTERVENTIONS  - Monitor WBC  - Administer growth factors as ordered  - Caitlin

## 2021-07-08 PROBLEM — Z71.89 ADVANCE CARE PLANNING: Status: ACTIVE | Noted: 2021-01-01

## 2021-07-08 PROBLEM — Z71.89 GOALS OF CARE, COUNSELING/DISCUSSION: Status: ACTIVE | Noted: 2021-01-01

## 2021-07-08 NOTE — PROGRESS NOTES
[address] Patient Status:  Inpatient    3/26/1945 MRN [id number]   Location [facility] 3W/SW Atten °C), Max:98.4 °F (36.9 °C)      Intake/Output:    Intake/Output Summary (Last 24 hours) at 7/8/2021 1107  Last data filed at 7/8/2021 0717  Gross per 24 hour   Intake 122.7 ml   Output 2850 ml   Net -2727.3 ml       Wt Readings from Last 2 Encounters:  07/

## 2021-07-08 NOTE — SLP NOTE
ADULT SWALLOWING EVALUATION    ASSESSMENT    ASSESSMENT/OVERALL IMPRESSION:      This BSE was ordered d/t Pt's desire for oral diet/no feeding tube despite presence of penetration/aspiration/pharyngeal retention during VFSS.  MD requested SLP recommend a le than left pleural effusions.          Pt alert; however, presented with overall weakness, fatigue. Pt on room air, afebrile and assessed sitting upright in bed (after consulting with RN). Vocal quality/intensity weak.  Volitional swallow and cough present, risks. Pt declined dysphagia exercises during this session and for further sessions. \"I'm too tired for swallowing exercises. All I care about is eating. \"       RECOMMENDATIONS   Diet Recommendations - Solids: Enrrique (1/2 tsp  d/t Pt requesting oral diet 3/9/2017   • No diabetic retinopathy in both eyes    • Prostate cancer ([facility]) 04/16/2018   • PSVT (paroxysmal supraventricular tachycardia) ([facility]) 3/9/2017    S/p SVT ablation 10-31-17    • Stroke [facility])    • SVT (supraventricular tachycardia) ([facility])     s/p a [address] [facility]  #[id number]

## 2021-07-08 NOTE — DIETARY NOTE
ADULT NUTRITION INITIAL ASSESSMENT    Pt is at high nutrition risk. Pt meets severe malnutrition criteria.       CRITERIA FOR MALNUTRITION DIAGNOSIS:  Criteria for severe malnutrition diagnosis: chronic illness related to wt loss greater than 10% in 6 moni maintain NPO with alternate means for nutrition/hydration. Spoke to MD and RN on unit - pt is adament that he does not want a feeding tube despite the risk of aspiration and possible pneumonia. Pt ok with altered consistency diet. Pt found lying in bed.  Re < > 104  --  104  --  100   CO2 31.0   < > 32.0  --  33.0*  --  38.0*   OSMOCALC 303*   < > 300*  --  298*  --  299*    < > = values in this interval not displayed.      GASTROINTESTINAL: +BM small/medium, soft, brown x3 on 7/7 and no noted N/V; s/p VFSE greater than 1 month, body fat severe depletion and muscle mass severe depletion    ESTIMATED NUTRITION NEEDS: Dosing wt: 56 kg  Calories: 1700 - 1950 calories/day (30-35 calories per kg Current wt)  Protein: 67 - 84 grams protein/day (1.2-1.5 grams protei WNL, prevent skin breakdown, promote healing and monitor fluid status    DIETITIAN FOLLOW UP: RD to follow and monitor nutrition status    [facility], [address], [address] [facility], [address] [facility]

## 2021-07-08 NOTE — CONSULTS
[address] Patient Status:  Inpatient    3/26/1945 MRN [id number]   Location [facility] 3W/SW Attending Angelika Nolasco MD   Hosp Day # 4 PCP Deng Grant MD     D hypertension    • Heart attack ([facility]    • High blood pressure    • HTN (hypertension), benign 3/9/2017   • No diabetic retinopathy in both eyes    • Prostate cancer ([facility] 04/16/2018   • PSVT (paroxysmal supraventricular tachycardia) ([facility] 3/9/2017    S/p S (LIPITOR) tab 20 mg, 20 mg, Oral, Nightly  •  aspirin chewable tab 81 mg, 81 mg, Oral, Daily  •  Rivaroxaban (XARELTO) tab 20 mg, 20 mg, Oral, Daily with dinner  •  tamsulosin HCl (FLOMAX) cap 0.4 mg, 0.4 mg, Oral, After dinner  •  glucose (DEX4) oral liqu Date    INR 1.18 01/10/2021    PTT 34.0 01/09/2021       Chemistry:  Lab Results   Component Value Date    CREATSERUM 0.64 (L) 07/08/2021    BUN 18 07/08/2021     07/08/2021    K 3.4 (L) 07/08/2021     07/08/2021    CO2 38.0 (H) 07/08/2021    G Weak, chronically-ill appearing  HEENT: No focal deficits. Cardiac: Tachycardic, irregular rate and rhythm, S1, S2 normal, no murmur, rub or gallop. Lungs: Diminished bilaterally anteriorly. Normal excursions and effort.   Abdomen: Soft, non-tender, norm include assistance with arising symptom management needs, extra layer of support, facilitate GOC/ACP discussions, ensure GOC are respected throughout healthcare continuum and assist with transition to hospice care when appropriate.       I explored pt/[place] failure, unspecified heart failure type [facility])  H/o cardiac arrest s/p AICD  Medical non-compliance  CAD  -tx per cardiology  -Recommend MDs discuss AICD deactivation given pt's DNAR wishes    Afib with RVR  -per cardiology    Severe dysphagia  -failed VSS, you for allowing Palliative Care services to participate in the care of Mr. Patria Ryder.        [facility] [id number]  7/8/2021  3:27 PM

## 2021-07-08 NOTE — PLAN OF CARE
Pt alert, 3L of oxygen, no complains of sob. Pt failed video swallow, strict NPO, q6 accucheck. Dobutamine, bumex gtt started.  Still Afib on tele xarelto on hold, on call cardiologist notified, monitor for sustained HR of 130s for 15 min per MD. Rudolph Marx fluid balance, assess for edema, trend weights  Outcome: Progressing  Goal: Absence of cardiac arrhythmias or at baseline  Description: INTERVENTIONS:  - Continuous cardiac monitoring, monitor vital signs, obtain 12 lead EKG if indicated  - Evaluate effect injury  - Provide assistive devices as appropriate  - Consider OT/PT consult to assist with strengthening/mobility  - Encourage toileting schedule  Outcome: Progressing     Problem: DISCHARGE PLANNING  Goal: Discharge to home or other facility with appropr

## 2021-07-08 NOTE — TELEPHONE ENCOUNTER
Spoke to Michael. Informed her that Dr. Darion Eduardo will be the one signing off on orders as he was the one who has evaluated the patient in office multiple times. Dr. Kendy Maldonado has never met pt and only did telemedicine once.   Michael aware and verbalized u

## 2021-07-08 NOTE — OCCUPATIONAL THERAPY NOTE
OCCUPATIONAL THERAPY TREATMENT NOTE - INPATIENT        Room Number: 408/253-N           Presenting Problem:  (palpitations, SOB, elevated HR)    Problem List  Principal Problem:    Acute on chronic congestive heart failure, unspecified heart failure type ( Plan: Balance activities; Energy conservation/work simplification techniques;ADL training;Functional transfer training; Endurance training;Patient/Family education;Patient/Family training;Equipment eval/education; Compensatory technique education    [address]

## 2021-07-08 NOTE — CM/SW NOTE
08: 47AM  Per RN rounds, pt is on the fence about Tube Feeds as pt has not passed video swallow and is now NPO. Pt remains on 3L O2 w/ no home O2. Will continue to monitor. Per RN/Bhavana, pt is currently 2 assist w/ walker and rolling chair.     Dearshahram Garcia

## 2021-07-08 NOTE — PROGRESS NOTES
[facility]    Progress Note    Aliica Muñoz Patient Status:  Inpatient    3/26/1945 MRN [id number]   Location [facility] 3W/SW Attending Gregg Curran MD   Hosp Day # 4 PCP Cherylyn Dakin, MD       Subjective: cardiac arrenst  S/p AICD  Medication noncompliance  Cardiomyopathy  - proBNP 20,996 on admission  -Echo on 7/6 with EF 25% and areas of hypokinesis  - cont dobutamine gtt and bumex gtt per cards  - monitor wts, I/O  - cardiology on consult  - cont asa  - improved    Weakness/deconditioning  Pt would like to go to rehab vs home as he feels he would need more help to get back on his feet  - PT/OT, encouraged patient to participate as he has refused.  Pt is agreeable  - sw to assist with dc planning for rehab

## 2021-07-08 NOTE — PHYSICAL THERAPY NOTE
PHYSICAL THERAPY TREATMENT NOTE - INPATIENT     Room Number: 557/167-Q       Presenting Problem: CHF    Problem List  Principal Problem:    Acute on chronic congestive heart failure, unspecified heart failure type [facility])  Active Problems:    Hypernatremia from continued skilled physical therapy while in the hospital and upon D/C from the hospital at a rehab facility.  The patient's Approx Degree of Impairment: 54.16% has been calculated based on documentation in the [facility] '6 clicks' Inpatient Basic Mobility 40.78   CMS Modifier (G-Code): CK  Patient End of Session: Up in chair;Needs met;Call light within reach;RN aware of session/findings; All patient questions and concerns addressed; Alarm set    CURRENT GOALS   Goals to be met by: 7/20/21  Patient Goal Kd

## 2021-07-08 NOTE — PROGRESS NOTES
[facility]    Progress Note    Jeanette Madrid Patient Status:  Inpatient    3/26/1945 MRN [id number]   Location [facility] 3W/SW Attending Candance Lewis, MD   Hosp Day # 3 PCP Pedro Pablo Ann MD       Subjective: noncompliance  Cardiomyopathy  - proBNP 20,996 on admission  -Echo on 7/6 with EF 25% and areas of hypokinesis  - cont dobutamine gtt and bumex gtt per cards  - monitor wts, I/O  - cardiology on consult  - cont asa, xarelto     Afib with RVR  Rate 116 now.

## 2021-07-09 PROBLEM — E46 MALNUTRITION (HCC): Status: ACTIVE | Noted: 2021-01-01

## 2021-07-09 NOTE — PLAN OF CARE
Pt is drowsy and ox2. 3L via nasal cannula. High bp noted. Lopressor given. High bp resolved. BP and HR stable. Primofit. Tele called about 5 beats then 12 beats of vtach. Pt complained of shortness of breath. MD Polo Avendaño notified. Lopressor given.  Issues replacement therapy as ordered  Outcome: Progressing     Problem: PAIN - ADULT  Goal: Verbalizes/displays adequate comfort level or patient's stated pain goal  Description: INTERVENTIONS:  - Encourage pt to monitor pain and request assistance  - Assess shawn for needed discharge resources and transportation as appropriate  - Identify discharge learning needs (meds, wound care, etc)  - Arrange for interpreters to assist at discharge as needed  - Consider post-discharge preferences of patient/family/discharge pa

## 2021-07-09 NOTE — CONSULTS
[facility] - [facility]    Report of Consultation    India Chavez Patient Status:  Inpatient    3/26/1945 MRN [id number]   Location [facility] 3W/SW Attending Mounika Garcia MD   Hosp Day # 5 PCP Puja Harrison MD     Consulta and posteriorly as well as soleal vein. He is currently on heparin GGT. Labs show CO2 of 43 BUN 22 creatinine 0.8 APTT 45, CBC noted for mild anemia 9.3 normal white count and normal platelet count.         Past Medical History  Past Medical History:   Leopold Martens 100 mg, 100 mg, Oral, BID  [Held by provider] Enalapril Maleate (VASOTEC) tab 5 mg, 5 mg, Oral, Daily  Insulin Regular Human (NOVOLIN R) 100 UNIT/ML injection 1-7 Units, 1-7 Units, Subcutaneous, 4 times per day  [Held by provider] atorvastatin (LIPITOR) ta )  Sennosides-Docusate Sodium 8.6-50 MG Oral Tab, Take 1 tablet by mouth nightly. (Patient not taking: Reported on 7/4/2021 )  aspirin 81 MG Oral Chew Tab, Chew 1 tablet (81 mg total) by mouth daily.  (Patient not taking: Reported on 4/15/2021 )  torsemide 07/09/2021    HCT 30.9 (L) 07/09/2021    .0 07/09/2021    CREATSERUM 0.82 07/09/2021    BUN 22 (H) 07/09/2021     07/09/2021    K 4.1 07/09/2021    CL 98 07/09/2021    CO2 43.0 (HH) 07/09/2021     (H) 07/09/2021    CA 9.5 07/09/2021 Xarelto. Given admission Xarelto was held and he underwent colonoscopy which was noted for nonbleeding ulcerations and radiation proctitis in the setting of prostate cancer.   He is now readmitted with heart failure and found to have extensive lower extrem

## 2021-07-09 NOTE — CONSULTS
[facility]    Report of Consultation    Marciano Hobson Patient Status:  Inpatient    3/26/1945 MRN [id number]   Location [facility] 3W/SW Attending Bright Sánchez MD   Hosp Day # 5 PCP Chaya Robison MD     Date o not use drugs.     Allergies:    Penicillins             UNKNOWN    Comment:Has received and tolerated Zosyn             (Piperacillin/Tazobactam), Cefazolin, and             Ceftriaxone in the past.    Medications:    Current Facility-Administered Medicati positive for left lower medial foot pain    Physical Exam:   General: Alert, orientated x3. Cooperative. No apparent distress. Vital Signs:  Blood pressure 103/61, pulse 114, temperature 98.2 °F (36.8 °C), temperature source Oral, resp.  rate 18, height microalbuminuria, without long-term current use of insulin (HCC)     Multinodular thyroid     History of stroke with residual deficit     Homonymous hemianopsia following cerebrovascular accident     BMI 25.0-25.9,adult     History of colonoscopy with poly

## 2021-07-09 NOTE — PROGRESS NOTES
[facility]    Progress Note    Tiffani Lake Patient Status:  Inpatient    3/26/1945 MRN [id number]   Location [facility] 3W/SW Attending Anais Shore MD   Hosp Day # 5 PCP Rex Martinez MD       Subjective: Lokesh Hunter MD on 7/09/2021 at 7:00 AM          XR VIDEO SWALLOW (USF=[id number])    Result Date: 7/7/2021  CONCLUSION:  1. Penetration and aspiration. 2.          A full report from speech pathology to follow. Dictated by (CST):  Lokesh Hunter MD on would not want a feeding tube.    - re-consult speech to rec modified/pleasure feed diet   - continued dysphagia therapy     Elevated troponin - flat, no CP  - not ACS  - cardiology on consult  - no further w/u at this time     HTN  -cont meds and monitor

## 2021-07-09 NOTE — PROGRESS NOTES
7/9/21 @ 3:28pm:   Referral received for community palliative services. [facility] will follow pt upon DC for community pc services.  Thank you for this referral.     7/12/21 @ 4:12pm:  Met with pt bedside today to discuss [facility] community palliative

## 2021-07-09 NOTE — CM/SW NOTE
11: 05AM  SW met w/ pt in his room to discuss SNF choice. Pt and SW sat together and reviewed SNF list provided to pt. SW addressed/answered all questions about SNFs. Pt has chosen [facility] for DC.     SW reserved [facility] via [facility] and requested

## 2021-07-09 NOTE — OCCUPATIONAL THERAPY NOTE
Pt not seen for OT at this time secondary to new DVT.  Will hold pt for this date and attempt to see next date as appropriate

## 2021-07-09 NOTE — PROGRESS NOTES
[address] Patient Status:  Inpatient    3/26/1945 MRN [id number]   Location [facility] 3W/SW Atten HEENT: No focal deficits. Neck: No JVD, carotids 2+ no bruits. Cardiac: Regular rate and rhythm, S1, S2 normal, no murmur  Lungs: Clear without wheezes, rales, rhonchi. Normal excursions and effort. Abdomen: Soft, non-tender.    Extremities: Without clu

## 2021-07-09 NOTE — PHYSICAL THERAPY NOTE
Attempted to see patient for physical therapy session. Patient with new extensive DVT in leg, on heparin per RN. Will attempt to see patient at later date. RN aware and agreeable.

## 2021-07-09 NOTE — PALLIATIVE CARE NOTE
[facility]  Palliative Care Follow Up    Maurice Patel Patient Status:  Inpatient    3/26/1945 MRN [id number]   Location [facility] 3W/SW Attending Ibeth Arellano MD   Hosp Day # 5 PCP Quan Estrada MD     Date of 200-3,000 Units/hr, Intravenous, Continuous  •  metoprolol tartrate (LOPRESSOR) tab 25 mg, 25 mg, Oral, TID Beta Blocker/Cardiac  •  docusate sodium (COLACE) cap 100 mg, 100 mg, Oral, BID  •  [Held by provider] Enalapril Maleate (VASOTEC) tab 5 mg, 5 mg, O 4.1 07/09/2021    CL 98 07/09/2021    CO2 43.0 (HH) 07/09/2021     (H) 07/09/2021    CA 9.5 07/09/2021    ALB 2.5 (L) 04/01/2021    ALKPHO 113 04/01/2021    BILT 0.5 04/01/2021    TP 5.5 (L) 04/01/2021    AST 7 (L) 04/01/2021    ALT 12 (L) 04/01/202 Warm and dry.     Palliative Performance Scale : 50%    Palliative Care Goals of Care:  Discussed with Patient and brother: Yes  Patient's preference about sharing medical information: speak to pt and brother Sarbjit Everett  Patient's decision making preferences: s of the option of bridge to hospice care if not improving, not ready for hospice right now as he wants to attempt rehab first  -Agreeable to palliative care following  -Dispo:  [facility] to follow.  Possible bridg

## 2021-07-09 NOTE — PLAN OF CARE
Afib 100-130's today. Dobutamine gtt dc'd. Started IV lopresssor. Bumex gtt infusing as ordered. Pt up with assist to the chair. Xarelto given crushed with applesauce, tolerated well. Continue to monitor.      Problem: CARDIOVASCULAR - ADULT  Goal: Maintain

## 2021-07-09 NOTE — PROGRESS NOTES
I spoke with Mr. España Gave about his DNR status and perspectives on end-of-life decisions. We talked about the implications of turning his defibrillator off at this time.   Patient is at high risk of malignant arrhythmias given his severe LV dysfunction and

## 2021-07-10 NOTE — PROGRESS NOTES
Fiona Jefferson Patient Status:  Inpatient    3/26/1945 MRN [id number]   Location [facility] 3W/SW Attending Mayte Albert MD   Hosp Day # 6 PCP Taylor Salguero MD     Assessment:    1.   Congestive heart S2 normal, no murmur  Lungs: Clear without wheezes, rales, rhonchi. Normal excursions and effort. Abdomen: Soft, non-tender. Extremities: Without clubbing, cyanosis or edema. Peripheral pulses are 2+. Skin: Warm and dry.      Labs:  Lab Results   Comp

## 2021-07-10 NOTE — PROGRESS NOTES
[facility]    Progress Note    Nils Fonseca Patient Status:  Inpatient    3/26/1945 MRN [id number]   Location [facility] 3W/SW Attending Alicia Rivera MD   Hosp Day # 6 PCP Ian Martinez MD       Subjective: Date: 7/9/2021  CONCLUSION:  1. There is both occlusive and nonocclusive thrombus noted in the right popliteal vein extending into 1 of the paired peroneal veins and into both posterior tibial veins. There is also thrombus in the soleal vein.     Dictated very swollen or painful or erythemetous. No procedure required. This is not considered failure of xarelto as pt was not taking this at home. Pt with hx of prostate cancer however PSA and CT abd/pelvis are ok here and do not suggest recurrence.   - appr

## 2021-07-10 NOTE — PROGRESS NOTES
[facility] - [facility]    Hematology/Oncology   Progress Note        Subjective  No new events    Assessment and Plan:   This 58-year-old male with multiple comorbidities including multifactorial cardiomyopathy with EF of 25% recent out-of-hospital ca AM     Finalized by (CST): Nancy Espinoza MD on 7/09/2021 at 7:00 AM          [id number]    Result Date: 7/9/2021  CONCLUSION:  1.  There is both occlusive and nonocclusive thrombus noted in the right popliteal vei 1-7 Units, 1-7 Units, Subcutaneous, TID CC  heparin (PORCINE) drip [id number]/250mL infusion CONTINUOUS, 200-3,000 Units/hr, Intravenous, Continuous  metoprolol tartrate (LOPRESSOR) tab 25 mg, 25 mg, Oral, TID Beta Blocker/Cardiac  docusate sodium (COLACE)

## 2021-07-10 NOTE — PLAN OF CARE
Problem: Patient Centered Care  Goal: Patient preferences are identified and integrated in the patient's plan of care  Description: Interventions:  - What would you like us to know as we care for you?  I have not taken my medications in 3 months  - Provid control medications as ordered  - Initiate emergency measures for life threatening arrhythmias  - Monitor electrolytes and administer replacement therapy as ordered  Outcome: Progressing     Problem: PAIN - ADULT  Goal: Verbalizes/displays adequate comfort INTERVENTIONS:  - Identify barriers to discharge w/pt and caregiver  - Include patient/family/discharge partner in discharge planning  - Arrange for needed discharge resources and transportation as appropriate  - Identify discharge learning needs (meds, wo

## 2021-07-11 NOTE — PROGRESS NOTES
Hanna De La Torre Patient Status:  Inpatient    3/26/1945 MRN [id number]   Location [facility] 3W/SW Attending Johnie Glass MD   Hosp Day # 7 PCP Michelle Cameron MD     Assessment:    1.   Congestive heart murmur  Lungs: Clear without wheezes, rales, rhonchi. Normal excursions and effort. Abdomen: Soft, non-tender. Extremities: Without clubbing, cyanosis or edema. Peripheral pulses are 2+. Skin: Warm and dry.      Labs:  Lab Results   Component Value Da

## 2021-07-11 NOTE — PLAN OF CARE
Pts heparin drip discontinued this morning and he was switched to PO eliquis. Pt has not had much of an appetite today, so despite BG being higher novolog has been held. Plan to discharge pt to [facility] [address] [facility] tomorrow pending medical clearance. weights  Outcome: Progressing  Goal: Absence of cardiac arrhythmias or at baseline  Description: INTERVENTIONS:  - Continuous cardiac monitoring, monitor vital signs, obtain 12 lead EKG if indicated  - Evaluate effectiveness of antiarrhythmic and heart rat appropriate  - Consider OT/PT consult to assist with strengthening/mobility  - Encourage toileting schedule  Outcome: Progressing     Problem: DISCHARGE PLANNING  Goal: Discharge to home or other facility with appropriate resources  Description: INTERVENTI

## 2021-07-12 NOTE — PLAN OF CARE
Marzena Leija, 2L NC, max assist, offered and educated on the importance of changing positions frequently, did not want breakfast this AM, complaints of headache, PRN tylenol given, plan to discharge today to [facility].       Problem: Patient Centered Care Absence of cardiac arrhythmias or at baseline  Description: INTERVENTIONS:  - Continuous cardiac monitoring, monitor vital signs, obtain 12 lead EKG if indicated  - Evaluate effectiveness of antiarrhythmic and heart rate control medications as ordered  - I to assist with strengthening/mobility  - Encourage toileting schedule  Outcome: Progressing     Problem: DISCHARGE PLANNING  Goal: Discharge to home or other facility with appropriate resources  Description: INTERVENTIONS:  - Identify barriers to discharge

## 2021-07-12 NOTE — DISCHARGE SUMMARY
[facility]    Discharge Summary    [place] Guerra Patient Status:  Inpatient    3/26/1945 MRN [id number]   Location [facility] 3W/SW Attending Ashli Gonzalez MD   Hosp Day # 8 PCP Quin Bundy MD     Date of Admis screening     V-tach [facility])     Cardiac arrest [facility])     Atrial fibrillation, unspecified type ([facility]     Ventricular fibrillation ([facility]     Short-term memory loss     Acute on chronic congestive heart failure (HCC)     Acute on chronic congestive heart fail for bilat LE DVTs. Pt now on Eliquis bid.     Extensive bilat LE DVTs  Areas of occlusive DVTs bilat  IR consulted.   Although there are areas of occlusive thrombus, pt's legs are not very swollen or painful or erythemetous.    No procedure required.   Beverly King dc from rehab   Contact information:  [address] [facility] [address]  [facility] [address] [facility] [address]             Natasha Driver MD In 2 weeks.     Specialties: Interventional, Cardiology, CARDIOLOGY  Contact information:  [address] [place] [address] tablet  Refills: 0     RA TRUEtest Test Strp      To check sugars once daily   Quantity: 100 strip  Refills: 2     Sennosides-Docusate Sodium 8.6-50 MG Tabs  Commonly known as: PERICOLACE      Take 1 tablet by mouth nightly.    Refills: 0     tamsulosin HCl

## 2021-07-12 NOTE — PHYSICAL THERAPY NOTE
Attempted to see patient for physical therapy session. Patient declined stating he just got back to bed, and was in the chair all morning.  Patient states he is weak and understands the point of therapy is to get stronger but he \"really does not want to ge

## 2021-07-12 NOTE — PLAN OF CARE
Javon to be transferred to [facility]. Report called and given to receiving RN Nilo Manning. IV removed, site CDI, tele removed.      Problem: Patient Centered Care  Goal: Patient preferences are identified and integrated in the patient's plan of care  Keara Reynoso Assess quality of pulses, skin color and temperature  - Assess for signs of decreased coronary artery perfusion - ex.  Angina  - Evaluate fluid balance, assess for edema, trend weights  7/12/2021 1654 by Elza Locke RN  Outcome: Completed  7/12/2021 RN  Outcome: Completed  7/12/2021 1022 by Janneth Garcia RN  Outcome: Progressing     Problem: SAFETY ADULT - FALL  Goal: Free from fall injury  Description: INTERVENTIONS:  - Assess pt frequently for physical needs  - Identify cognitive and physical d

## 2021-07-12 NOTE — CM/SW NOTE
07/12/21 1600   Discharge disposition   Expected discharge disposition Skilled Nurs   Name of Facillity/Home Care/Hospice [facility]   Patient is Discharged to a [address] [place] [address] Yes   Discharge transportation [facility]   MDO received f

## 2021-07-12 NOTE — PROGRESS NOTES
[address] Patient Status:  Inpatient    3/26/1945 MRN [id number]   Location [facility] 3W/SW Atten without wheezes, rales, rhonchi. Normal excursions and effort. Abdomen: Soft, non-tender. Extremities: Without clubbing, cyanosis or edema. Peripheral pulses are 2+. Skin: Warm and dry.      Labs:  Lab Results   Component Value Date    WBC 8.1 07/12/2

## 2021-07-12 NOTE — PLAN OF CARE
Problem: Patient Centered Care  Goal: Patient preferences are identified and integrated in the patient's plan of care  Description: Interventions:  - What would you like us to know as we care for you?  I have not taken my medications in 3 months  - Provid interpreters to assist at discharge as needed  - Consider post-discharge preferences of patient/family/discharge partner  - Complete POLST form as appropriate  - Assess patient's ability to be responsible for managing their own health  - Refer to [facility]

## 2021-07-12 NOTE — OCCUPATIONAL THERAPY NOTE
Attempt made for OT treatment. Pt strongly deferred activity despite multiple attempts / reasoning to encourage pt to participate. Will follow-up tomorrow.

## 2021-07-12 NOTE — PALLIATIVE CARE NOTE
[facility]  Palliative Care Follow Up    Saira Ma Patient Status:  Inpatient    3/26/1945 MRN [id number]   Location [facility] 3W/SW Attending Sergio Gomez MD   Hosp Day # 8 PCP Sujey Dominique MD     Date of docusate sodium (COLACE) cap 100 mg, 100 mg, Oral, BID  •  [Held by provider] atorvastatin (LIPITOR) tab 20 mg, 20 mg, Oral, Nightly  •  aspirin chewable tab 81 mg, 81 mg, Oral, Daily  •  tamsulosin HCl (FLOMAX) cap 0.4 mg, 0.4 mg, Oral, After dinner  • (H) 07/12/2021     (H) 07/12/2021    CA 9.7 07/12/2021    ALB 2.5 (L) 04/01/2021    ALKPHO 113 04/01/2021    BILT 0.5 04/01/2021    TP 5.5 (L) 04/01/2021    AST 7 (L) 04/01/2021    ALT 12 (L) 04/01/2021    PSA 0.03 07/09/2021    MG 2.0 07/07/2021 guarding  Extremities: Without clubbing, cyanosis. Peripheral pulses are 2+. 1+ Edema BLE present  Neurologic: Alert and oriented X3, normal affect. Skin: Warm and dry.     Palliative Performance Scale : 50%    Palliative Care Goals of Care:  Discussed wit and comfort focused tx on dc  -Ongoing GOC discussions will be needed at rehab with pt/brother  -Pt is aware of the option of bridge to hospice care if not improving, not ready for hospice right now as he wants to attempt rehab first  -Agreeable to palliat

## 2021-07-12 NOTE — CM/SW NOTE
Pt has a discharge order in.  SW requested transport time via [facility]. [facility] is now refusing pt placement despite acceptance and insurance authorization. LELA printed accepting facility listing and asked pt to choose another accepting facility.   Pt choose [facility]

## 2021-07-13 NOTE — PAYOR COMM NOTE
Discharge Notification    Patient Name: Honey Merritt MA O  Subscriber #: [id number]  Authorization Number: [id number]  Admit Date/Time: 7/4/2021 2:13 PM  Discharge Date/Time: 7/12/2021 6:30 PM

## 2021-07-13 NOTE — PROGRESS NOTES
1st attempt     [facility]  [address]  [phone]  [address]      Patient discharge to [address] [facility] info on AVS

## 2021-07-14 PROBLEM — I48.91 ATRIAL FIBRILLATION WITH RVR (HCC): Status: ACTIVE | Noted: 2021-01-01

## 2021-07-14 NOTE — CM/SW NOTE
Spoke with Danae RAJAN at [facility] at [phone] and informed her that patient was being admitted on [facility] gtt as Observation. Danae RAJAN will touch base with hospitalist tomorrow.

## 2021-07-14 NOTE — CM/SW NOTE
Received MDO for DC Planning. Pt was just DC'd to [facility] [address] [place] [address] on Monday 7/12. LELA consulted w/ napoleon Lozada from [email or web address] - confirmed pt will require new insurance auth as he has been placed on unit at [address] [place] [address]. LELA sent updates via [employer].

## 2021-07-14 NOTE — PROGRESS NOTES
Eduardo Paul  : 3/26/1945  Age 67year old  male patient is admitted to [address] [place] [address] for rehabilitation and strengthening.       Chief complaint: elevated heart rate, fatigue    HPI  67year old male with a history of afib, SVT s/p ablation, HFrEF of perforation following polypectomy   • COLONOSCOPY N/A 4/5/2021    Procedure: COLONOSCOPY;  Surgeon: Mahsa Hall MD;  Location: [address] [place] [address] ENDOSCOPY   • EP DUAL CHAMBER ICD  1/22/2021        • OTHER      ukn     Family History   Problem Relation Age of SUBJECTIVE    Patient feels as though his heart is racing and he is very fatigue. He denies shortness of breath, nausea or vomiting.  He confirmed he is ok with going to the hospital with the possibility of readmission since his brother was ok with with RVR  Rates in 130s  - continue metoprolol 25mg Q8  - cardiology following in rehab  - monitor      Dysphagia, severe, with aspiration  Pt notes coughing with eating for years.   - VFS this admit showed aspiration  - speech recommended NPO  - Pt on plea

## 2021-07-14 NOTE — H&P
[facility]    PATIENT'S NAME: Marlene Lindquist   ATTENDING PHYSICIAN: Shanice Ortiz MD   PATIENT ACCOUNT#:   [de-identified]    LOCATION:  [place] [address]  MEDICAL RECORD #:   [id number]       YOB: 1945  ADMISSION DATE:       07/14/20 12:49:01  t: 07/14/2021 13:07:31  [facility] [id number]  FB/

## 2021-07-14 NOTE — CONSULTS
[address] NOTE      Patient Name: Arjun Cortes MRN: [id number]   : 3/26/1945 CSN: [id number] pain, running nose, headaches or swollen glands. Skin: No rashes, pruritus or skin changes,  Respiratory: No cough  CV: No chest pain or claudication. Musculoskeletal: No joint pain, stiffness or swelling. : No dysuria or hematuria.   GI: No nausea, vo apixaban  5 mg Oral BID   • aspirin  81 mg Oral Daily   • atorvastatin  20 mg Oral Nightly   • Enalapril Maleate  2.5 mg Oral Daily   • tamsulosin HCl  0.4 mg Oral Daily       PHYSICAL EXAM:   Blood pressure 115/41, pulse (!) 132, temperature 97.9 °F (36.6

## 2021-07-14 NOTE — PROCEDURES
[facility]    Cardiac Electrophysiology Procedure Note    [facility]   [facility] [id number] MRN [id number]   Admission Date 7/14/2021 Procedure Date 7/14/2021   Attending Physician Oswaldo May MD Procedure Physi minute waiting period. The catheter and sheath were removed, and hemostasis obtained with 1 Vascade MVP closure device and manual compression. There were no hematomas. The patient's device was interrogated, with all leads having stable parameters.  The ICD

## 2021-07-14 NOTE — CM/SW NOTE
Received call from [facility] NP at [facility] regarding patient being sent for Afib    Patient is a DNR and Do Not Readmit (from [facility]) and Danae spoke with Cards who requested that they be sent to ER for Medical treatment of Afib - IV Meds, IV Fluids.   P

## 2021-07-14 NOTE — ED INITIAL ASSESSMENT (HPI)
Recently discharged and was at [facility] for 2 days. To [facility] with afib rvr. History of afb. Patient has no complaints.

## 2021-07-14 NOTE — ED QUICK NOTES
Orders for admission, patient is aware of plan and ready to go upstairs. Any questions, please call ED ALEJANDRINA Aranda Sathish  at extension [phone].    Type of COVID test sent:  COVID Suspicion level:rapid Low    Titratable drug(s) infusin  Rate:0    LOC at time of jones

## 2021-07-15 NOTE — CONSULTS
[address] Patient Status:  Inpatient    3/26/1945 MRN [id number]   Location [facility] 3W/SW Attending Bill Bhatia MD   Hosp Day # 1 PCP Home Ojeda MD     Efe in low back and buttock region near sore, currently 7/10 and tylenol prn is not really helping. He reports mild dyspnea symptoms at rest, no signs of distress noted.  He tells me his appetite has been poor, denies abdominal pain, n/v and moved his bowels ye Ceftriaxone in the past.    Medications:     Current Facility-Administered Medications:   •  glucose (DEX4) oral liquid 15 g, 15 g, Oral, Q15 Min PRN **OR** Glucose-Vitamin C (DEX-4) chewable tab 4 tablet, 4 tablet, Oral, Q15 Min PRN **OR** dextrose 50 % i denies      Hematology:  Lab Results   Component Value Date    WBC 7.6 07/15/2021    HGB 7.9 (L) 07/15/2021    HCT 27.0 (L) 07/15/2021    .0 07/15/2021       Coags:  Lab Results   Component Value Date    INR 1.18 01/10/2021    PTT 43.7 (H) 07/12/202 oz (54 kg), SpO2 94 %. Body mass index is 20.42 kg/m². Physical Exam:  General: Alert and in no apparent distress. Weak, cachectic appearing  HEENT: No focal deficits. Cardiac: Regular rate and rhythm, S1, S2 normal, no murmur, rub or gallop.   Lungs: education on the differences between palliative care and hospice care.  I discussed the benefits of palliative care to include assistance with arising symptom management needs, extra layer of support, facilitate GOC/ACP discussions, ensure GOC are respected agree.     Hospitalization:  Pt prefers to avoid transfer to hospital or emergency room unless for comfort    Procedures:  No intubation  No g-tube      Assessment/Recommendations:    Afib with RVR  -s/p AV node ablation 7/14, per cardiology    Acute on ch 40%     Emotion support provided to patient/family today:  Yes        Palliative Care Follow Up:    A total of 55 mins were spent on this consult, which included all of the following:direct face to face contact, history taking, physical examination, and >50

## 2021-07-15 NOTE — PROGRESS NOTES
[facility]    Progress Note    [place] Guerra Patient Status:  Inpatient    3/26/1945 MRN [id number]   Location [facility] 3W/SW Attending [place] Watkins MD   Hosp Day # 1 PCP Lashaun Schwartz MD     HPI/Subjective: (CST): Caterina Bateman MD on 7/14/2021 at 12:49 PM     Finalized by (CST):  Caterina Bateman MD on 7/14/2021 at 12:54 PM          EKG 12 Lead    Result Date: 7/14/2021  ECG Report  Interpretation  -------------------------- Atrial fibrillation -ST depres Syncope, unspecified syncope type

## 2021-07-15 NOTE — CM/SW NOTE
LELA spoke with brother adam to discuss hospice and options. Brother stating that nh placement is an issue due to finances. [facility] stated that he did not qualify, however brother stated he only has approximately 2000 to his name.  LELA discussed skil

## 2021-07-15 NOTE — PHYSICAL THERAPY NOTE
PHYSICAL THERAPY EVALUATION - INPATIENT     Room Number: 336/336-A  Evaluation Date: 7/15/2021  Type of Evaluation: Initial   Physician Order: See Comment for Specific Order (weakness)    Presenting Problem: A fib with RVR, s/p ablation 7/14  Reason for T patient's pre-admission status. The patient's Approx Degree of Impairment: 72.57% has been calculated based on documentation in the [facility] '6 clicks' Inpatient Basic Mobility Short Form.   Research supports that patients with this level of impairment may shlomo DEFIBRILLATOR PLACEMENT     • COLON SURGERY  1997    repair of perforation following polypectomy   • COLONOSCOPY N/A 4/5/2021    Procedure: COLONOSCOPY;  Surgeon: Rios Diaz MD;  Location: [facility] ENDOSCOPY   • EP AV NODE ABLATION  7/14/2021        • move BLEs towards EOB    BALANCE  Static Sitting: Fair -  Dynamic Sitting: Fair -  Static Standing: Poor +  Dynamic Standing: Poor    ACTIVITY TOLERANCE  Pre-activity, supine:  SPO2 93% on 1L O2 NC  HR 70 bpm  BP /45 mmHg    Post-activity, seated: elevated)    CURRENT GOALS    Goals to be met by: 7/29/21  Patient Goal Patient's self-stated goal is: to get up to the chair   Goal #1 Patient is able to demonstrate supine - sit EOB @ level: supervision     Goal #1   Current Status    Goal #2 Patient is

## 2021-07-15 NOTE — PROGRESS NOTES
[address] Patient Status:  Inpatient    3/26/1945 MRN [id number]   Location [facility] 3W/SW Atten pulses are 2+. Skin: Warm and dry.      Labs:  Lab Results   Component Value Date    WBC 7.6 07/15/2021    HGB 7.9 07/15/2021    HCT 27.0 07/15/2021    .0 07/15/2021     Lab Results   Component Value Date    INR 1.18 01/10/2021     Lab Results   Com

## 2021-07-15 NOTE — PAYOR COMM NOTE
--------------  ADMISSION REVIEW     [facility]  Subscriber #:  [id number]  Authorization Number: [id number]       ED Provider Notes        History   Patient presents with:  Arrythmia/Palpitations    Stated Complaint: afib    HPI/Subjective:   HPI complaint: afib  Other systems are as noted in HPI. Constitutional and vital signs reviewed. All other systems reviewed and negative except as noted above.     Physical Exam     ED Triage Vitals [07/14/21 1136]   /84   Pulse (!) 151   Resp 18 Psychiatric:         Attention and Perception: Attention normal.         Mood and Affect: Mood normal.      Comments: Pt is poor historian, unclear timelines          ED Course     Labs Reviewed   BASIC METABOLIC PANEL (8) - Abnormal; Notable for the fol RA    Cardiac Monitor: 137, atrial fibrillation, abnormal for rate and rhythm       Radiology findings: XR CHEST AP PORTABLE  (CPT=71045)    Result Date: 7/14/2021  CONCLUSION:  1. Status post defibrillator insertion.  2. Prominent basilar markings suggesti with IV Cardizem drip.     HISTORY OF PRESENT ILLNESS:  The patient is a 79-year-old  male with complex past medical history including cardiomyopathy, paroxysmal atrial fibrillation, aortic stenosis, dysphagia and chronic aspiration, who was discha detail, including but not limited to access site pain, bleeding, DVT/PE, pacemaker dependence and its risks, and anesthesia complications.             7/14 ABLATION    Preprocedure Diagnosis: Atrial fibrillation refractory to medical therapy     Postprocedu DNAR  DISPO: pending, goals of care with palliative care and possible hospice              Lab Results   Component Value Date     WBC 7.6 07/15/2021     HGB 7.9 (L) 07/15/2021     HCT 27.0 (L) 07/15/2021     .0 07/15/2021     CREATSERUM 0.52 (L) 07/ (KLOR-CON) powder packet 40 mEq     Date Action Dose Route User    7/15/2021 1244 Given 40 mEq Oral Taj Cerise    7/15/2021 0845 Given 40 mEq Oral Taj Cerise      tamsulosin HCl [facility]) cap 0.4 mg     Date Action Dose Route User    7/15/2021 0892

## 2021-07-15 NOTE — PLAN OF CARE
Primo Morgan is alert and oriented x2, bedbound at baseline. He came in for poorly controlled atrial fibrillation. He was brought up from ER on a cardizem gtt with HR in 120's/130's. Dr. Korina Walters at bedside and aware of elevated HR. Plans for AV node ablation today.  He

## 2021-07-15 NOTE — PROGRESS NOTES
[facility]OSP - [facility]     Cardiology Progress Note        Julio Césarthong Nicolás Patient Status:  Observation    3/26/1945 MRN [id number]   Location [facility] 3W/SW Attending Frank Yang MD   Hosp Day # 0 PCP Vick Lopez MD HCl  0.4 mg Oral Daily         Assessment:    • afib with rvr contributing to chf s/p AVN ablation, Dual chamber icd reprogramming 7/14  • NSVT  • Anemia- hgb down 0.8gms.  no signs of bleeding hgb 7.9 today  • Hypernatremia- suspect very poor po intake  •

## 2021-07-15 NOTE — ED PROVIDER NOTES
Patient Seen in: [facility] 3w/sw      History   Patient presents with:  Arrythmia/Palpitations    Stated Complaint: afib    HPI/Subjective:   HPI    67year old male with multiple medical issues including atrial fibrillation on Eliquis, CVA, diab tobacco: Never Used    Vaping Use      Vaping Use: Never used    Alcohol use: Not Currently      Comment: rarely    Drug use:  No             Review of Systems   Unable to perform ROS: Other       Positive for stated complaint: afib  Other systems are as no Coloration: Skin is not pale. Findings: No erythema or rash. Neurological:      Mental Status: He is alert. He is disoriented. Sensory: No sensory deficit. Motor: Motor function is intact.    Psychiatric:         Attention and Perception: A DIFFERENTIAL WITH PLATELET    Narrative: The following orders were created for panel order CBC With Differential With Platelet.   Procedure                               Abnormality         Status                     --------- Oral Given 7/14/21 2118)   Enalapril Maleate (VASOTEC) tab 2.5 mg (2.5 mg Oral Not Given 7/14/21 1530)   tamsulosin HCl (FLOMAX) cap 0.4 mg (0.4 mg Oral Not Given 7/14/21 1530)   acetaminophen (TYLENOL EXTRA STRENGTH) tab 1,000 mg (1,000 mg Oral Given 7/14

## 2021-07-15 NOTE — PLAN OF CARE
Patient alert and oriented x 2 and very forgetful. Pain medication given as needed. IV fluids started and plan is for consult to hospice. Brother Vivienne Peters updated on the plan of care. Discharge pending placement and insurance authorization.      Problem: Patient ADULT  Goal: Maintains optimal cardiac output and hemodynamic stability  Description: INTERVENTIONS:  - Monitor vital signs, rhythm, and trends  - Monitor for bleeding, hypotension and signs of decreased cardiac output  - Evaluate effectiveness of vasoacti signs and symptoms of hyperglycemia and hypoglycemia  - Administer ordered medications to maintain glucose within target range  - Assess barriers to adequate nutritional intake and initiate nutrition consult as needed  - Instruct patient on self management

## 2021-07-15 NOTE — PLAN OF CARE
Gregoria Dominguez is oriented x 2. He denies chest pain/SOB. O2 weaned down to 1L via nasal cannula. Saturating in the mid-90s. He had an AV ablation yesterday. R groin with gauze and transparent film dressing with some old drainage.  No hematoma, no bleeding, no bru sites for bleeding and/or hematoma  - Assess quality of pulses, skin color and temperature  - Assess for signs of decreased coronary artery perfusion - ex.  Angina  - Evaluate fluid balance, assess for edema, trend weights  Outcome: Progressing  Goal: Absen INTERVENTIONS:  - Monitor labs and rhythm and assess patient for signs and symptoms of electrolyte imbalances  - Administer electrolyte replacement as ordered  - Monitor response to electrolyte replacements, including rhythm and repeat lab results as appro

## 2021-07-15 NOTE — CM/SW NOTE
12: 30PM  Received notice from pt's RN/Deepti beckman - pt's brother would like to speak to SW about LTC and payments for that. Per RN rounds this AM, pt is cleared for DC.  LELA informed RN that insurance auth still pending for return to [facility] and LELA Tino Dwyer stated they discussed NH w/ hospice and LTC and paying room & board. SW received call from [place] w/ [employer] - pt is over income and does not qualify for Medicaid at this time.      SW confirmed w/ ALEJANDRINA Balbuena [facility] with IVF likely 2/2 hypovolemia hyponatremia   Continue to trend   At baseline mental status  sp NS and LR  check urine lytes -Send for stool cx, GI PCR, unlikely to be c.diff given hx and no risk factors  -CTAP no evidence of colitis or SBO/ileus  -patient states stools are black to green; hgb steady  -GI to do EGD in the AM

## 2021-07-15 NOTE — OCCUPATIONAL THERAPY NOTE
OCCUPATIONAL THERAPY EVALUATION - INPATIENT     Room Number: 336/336-A  Evaluation Date: 7/15/2021  Type of Evaluation: Initial       Physician Order: IP Consult to Occupational Therapy  Reason for Therapy: ADL/IADL Dysfunction and Discharge Planning    OC required to safely return home. Pending patient's medical plan and goals, recommend return to [facility] as prior to July 2021, pt was living alone and independent at an extended stay hotel. DISCHARGE RECOMMENDATIONS  OT Discharge Recommendations:  Other (Com alarm  Fall Risk: High fall risk    PAIN ASSESSMENT  Rating: Unable to rate  Location: heels       ACTIVITY TOLERANCE  Pulse: 70  Heart Rate Source: Monitor                   O2 SATURATIONS  Oxygen Therapy  SPO2% on Oxygen at Rest: 90  Ambulation oxygen fl dressing with SBA  Comment:          Goals  on: 2021  Frequency: 3x/wk

## 2021-07-16 NOTE — PHYSICAL THERAPY NOTE
Chart reviewed, attempted to see pt for PT f/u tx. Pt currently with SOB and increased secretions. RN present in room and consulting respiratory for a tx. Not appropriate for mobilit at this time. Will f/u in future as appropriate.     TULIO ArzolaT

## 2021-07-16 NOTE — PROGRESS NOTES
[facility] - [facility]    Progress Note    Kaitlynn Fernández Patient Status:  Inpatient    3/26/1945 MRN [id number]   Location [facility] 3W/SW Attending Jessie Pathak MD   Hosp Day # 2 PCP Vladimir Marroquin MD     HPI/Subjective: aspirate    Dual chamber ICD     Goals of care  Palliative care consult  Possible hospice consult     Hyperthyroidism  Await goc discussion  Consider endocrine consult     Hypernatremia  IVF   Monitor     DVT: eliquis   CODE: DNAR  DISPO: Medically stable

## 2021-07-16 NOTE — PROGRESS NOTES
[facility]     Cardiology Progress Note        Zaid Brochure Patient Status:  Observation    3/26/1945 MRN [id number]   Location [facility] 3W/SW Attending Slava Graham MD   Hosp Day # 2 PCP Terry Bowen MD rvr contributing to chf s/p AVN ablation, Dual chamber icd reprogramming 7/14  • NSVT  • Anemia- hgb 7.9 yesteday  • Hypernatremia- suspect very poor po intake.  Slight improvement on ivf  • Recently diagnosed  RLE DVT- on epixaban  • Prostate ca  • Recent

## 2021-07-16 NOTE — PAYOR COMM NOTE
--------------  CONTINUED STAY REVIEW    Karrie Wilson MA [facility]  Subscriber #:  [id number]  Authorization Number: [id number]          7/16 CARDS    Tired this am.  + productive cough this am         V paced with multiple runs of wct some of which appear to be °C) 70 18 125/41 92 % — Nasal cannula 1 L/min           MEDICATIONS ADMINISTERED IN LAST 1 DAY:  apixaban (ELIQUIS) tab 5 mg     Date Action Dose Route User    7/15/2021 2204 Given 5 mg Oral Beverly Landa RN    7/15/2021 0845 Given 5 mg Oral Amber Garcia

## 2021-07-16 NOTE — PLAN OF CARE
Dmitriy Ferreira is oriented x 2-3. Denies chest pain/SOB. Reports pain to buttocks. Norco administered for pain management. Pt has stage 2 pressure sore on his bottom. Barrier cream and Mepilex applied. Redness to bilateral heels. Foam boats on.    Pt suctioned bleeding and/or hematoma  - Assess quality of pulses, skin color and temperature  - Assess for signs of decreased coronary artery perfusion - ex.  Angina  - Evaluate fluid balance, assess for edema, trend weights  Outcome: Progressing  Goal: Absence of card INTERVENTIONS:  - Monitor labs and rhythm and assess patient for signs and symptoms of electrolyte imbalances  - Administer electrolyte replacement as ordered  - Monitor response to electrolyte replacements, including rhythm and repeat lab results as appro

## 2021-07-16 NOTE — PROGRESS NOTES
[facility] received referral for community pc services. Pt and pt's brother Jose Juan Cousin are agreeable to [facility] upon DC to [address] [facility].       Azeb Diaz   [facility]  Ext. [phone]

## 2021-07-16 NOTE — CM/SW NOTE
08: 00AM  Received MDO for Hospice consult at 4:30PM on 7/15.  SW consulted w/ [facility], this consult was requested by pt's RN/Elva yesterday afternoon at approx 3PM.     LELA informed [facility] [place] [facility] that hospice met w/ pt and pt's brother prior to that c

## 2021-07-17 NOTE — DISCHARGE SUMMARY
[facility]  DISCHARGE SUMMARY     Zaid Brochure Patient Status:  Inpatient    3/26/1945 MRN [id number]   Location [facility] 3W/SW Attending No att. providers found   [facility] Day # 2 PCP Terry Bowen MD     DATE his symptoms. Patient and family met with palliative care, hospice. There is a plan for discharge to a skilled nursing facility, palliative care approach, plans to not readmit.     The patient's hypernatremia will be an ongoing issue and given his very po Tabs  Commonly known as: LOPRESSOR  What changed:   · medication strength  · how much to take  · when to take this      Take 1 tablet (50 mg total) by mouth 2x Daily(Beta Blocker).    Refills: 0        CONTINUE taking these medications      Instructions Pre understand to return to the emergency room for any concerning signs or symptoms.   Greater than 30 minutes spent on discharge.  -----------------------    Hospital Discharge Diagnoses: Atrial fibrillation with rapid ventricular response, status post AV node

## 2021-07-19 NOTE — PROGRESS NOTES
OF PRESENT ILLNESS:  The patient is a 79-year-old  male with complex past medical history including cardiomyopathy, paroxysmal atrial fibrillation, aortic stenosis, dysphagia and chronic aspiration, who was discharged from the hospital just yester Surgical History:  Procedure Laterality Date  • CARDIAC DEFIBRILLATOR PLACEMENT      • COLON SURGERY   1997    repair of perforation following polypectomy  • COLONOSCOPY N/A 4/5/2021    Procedure: COLONOSCOPY;  Surgeon: Jose Barrera MD;  Location: E

## 2021-07-19 NOTE — PAYOR COMM NOTE
Discharge Notification    Patient Name: Karen Lux MA [facility]  Subscriber #: [id number]  Authorization Number: [id number]  Admit Date/Time: 7/14/2021 11:31 AM  Discharge Date/Time: 7/16/2021 6:32 PM

## 2021-07-20 NOTE — PROGRESS NOTES
follow up  Past Medical History:  Diagnosis Date  • A-fib [facility])    • Arrhythmia      a fib  • Cataract    • [facility] 3/9/2017  • Depression    • Diabetes [facility])    • Essential hypertension    • Heart attack [facility]    • High bloo aspiration pneumonia  Pt and family declined gtube  Modified pleasure feed diet  Likely continues to aspirate, speech and diatary to follow up     Dual chamber ICD       Hyperthyroidism  recheck tsh       Hypernatremia  improved , monitor, bmp   CODE: DNAR

## 2021-07-21 NOTE — PROGRESS NOTES
Danish Grief  : 3/26/1945  Age 67year old  male patient is admitted to [address] [place] [address] for rehabilitation and strengthening.       Chief complaint: elevated heart rate, fatigue    HPI  67year old male with a history of afib, SVT s/p ablation, HFrEF Used    Vaping Use      Vaping Use: Never used    Alcohol use: Not Currently      Comment: rarely    Drug use: No      ALLERGIES:    Penicillins             UNKNOWN    Comment:Has received and tolerated Zosyn             (Piperacillin/Tazobactam), Cefazoli normocephalic; normal nose, no nasal drainage, mucous membranes pink, moist, pharynx no exudate, no visible cerumen.    NECK: supple; FROM; no JVD, no TMG, no carotid bruits  RESPIRATORY:clear to percussion and auscultation  CARDIOVASCULAR: irregular rhythm associated retinopathy  - A1C, 6.5 on 01/07/21  - accuchecks ACHS  - sliding scale  - monitor     HLD  - continue atorvastatin 20mg HS  - monitor     BPH  - continue tamsulosin 0.4mg daily  - monitor     Chronic normocytic anemia  Hemoccult positive  Recen

## 2021-07-21 NOTE — CDS QUERY
Addended by: JACKIE GOVEA on: 2/5/2018 10:55 AM     Modules accepted: Orders     How to Answer this Query    1.) Click \"Edit Button\" on the toolbar  2.) Type an \"X\" in the bracket for the diagnosis that applies. (You may also add additional clinical details as you feel necessary to substantiate your response).   3.) Finally click \" Clinical :  Fatimah Dunlap RN CCDS at [phone]     Thank You!      THIS FORM IS A PERMANENT PART OF THE MEDICAL RECORD

## 2021-07-29 ENCOUNTER — TELEPHONE (OUTPATIENT)
Dept: FAMILY MEDICINE CLINIC | Facility: CLINIC | Age: 76
End: 2021-07-29

## 2021-07-29 ENCOUNTER — TELEPHONE (OUTPATIENT)
Dept: INTERNAL MEDICINE CLINIC | Facility: CLINIC | Age: 76
End: 2021-07-29

## 2021-07-29 NOTE — TELEPHONE ENCOUNTER
The nurse practitioner Alejandra Connell who saw the patient should be able to sign the death certificate. I do not know the patient. If cannot wait until Dr. Lupe Rose comes back in.

## 2021-07-29 NOTE — TELEPHONE ENCOUNTER
Aubree from [facility] has faxed over the death certificate for the patient. Certificate was given to Dr. Anne Shaikh for review. Aubree confirmed patient  at the Nursing Home: Cliff Diana.     However per Dr. Anne Shaikh, to inform  ho

## 2021-07-29 NOTE — TELEPHONE ENCOUNTER
Aubree with [employer] is requesting confirmation Dr. Sofy Castellanos will sign death certificate. Per [facility], Dr. Sofy Castellanos was the last provider to treat the patient. Death certificate needs to be signed before Dr. Sofy Castellanos returns on 8/9.  [facility] is requesting a ca

## 2021-07-30 NOTE — TELEPHONE ENCOUNTER
Death certificate has been faxed to [facility], fax confirmation has been received.   Spoke with Aubree from [employer] center and informed her that the certificate would be signed by physician in the nursing home, Aubree voiced understanding,

## 2021-07-30 NOTE — TELEPHONE ENCOUNTER
Spoke to [facility] Megan, informed that Dr. Nimisha Bowers is unavailble to sign the death cert. Suggested [facility].

## 2021-07-30 NOTE — TELEPHONE ENCOUNTER
Spoke with Pilar Barajas at [facility] Dept. Advised Dr Shena Cedeno saw this patient at nursing facility and is out of town. Advised NP cannot sign death certificate and must be signed by a physician.   He asked me to fax fr

## 2021-07-30 NOTE — TELEPHONE ENCOUNTER
Aubree returning call stating that a nurse practitioner can't be the one to sign certificate it has to be a Dr. Silas Smith call back.

## 2022-04-04 NOTE — DISCHARGE SUMMARY
General Medicine Discharge Summary     Patient ID:  Arleth Koehler  66year old  3/26/1945    Admit date: 3/3/2017    Discharge date and time: 3/7/2017  1:31 PM     Attending Physician: No att. 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 Since he has started seeing Kelly for therapy, his foot position and range of motion has improved. Darren has night splints and AFOs, but hasn't been using his AFOs because they haven't fit right. Mom would like new ones when he finishes with casting.   Pain / Symptoms:  Location: No pain   Function:  - Activities reported as painful or challenging: walks on tip toes  Patient Goals: to walk with feet flat    Prior treatment: outpatient PT  Home Environment:   Patient lives with parent or legal guardian. sibling(s)  Assistance available: consistent  School or Day Care: full time school  Feels safe at home/work/school.  2 or more falls or an unexplained fall with injury in the last year:  No    OBJECTIVE                                                                                                                    Observation:   Behavior: follows multi step commands Quiet, but pleasant  Posture:     • Standing: poor  Child in standing     • LLE: pronated foot position     • RLE: pronated foot position  Range of Motion (measured in degrees unless otherwise indicated, active unless indicated):  Ankle:   - Dorsiflexion:      • Left: Knee flexed resistance (R): R2: 5 Knee extended resistance (R): R1: 4 R2: 4      • Right: Knee flexed resistance (R): R1: 6 R2: 6 Knee extended resistance (R): R1: -7 R2: -8   - Inversion:     • Left:    Passive: 30     • Right:    Passive:  15   - Eversion:     • Left:    Passive: 9     • Right:    Passive: 35  Calcaneal Eversion:     • Left: restin     • Right: restin    Muscle Flexibility/Length Testing:  - Popliteal angle (degrees): Left:57 Right:45       Special Tests:   Apparent Leg Length:   - Anterior Superior Iliac Crest to Medial Malleoli in Supine:       • Left: 78 cm • Right: 78 cm      Casting:  Left:    - Number:1     - Type: serial and short leg     - Material: fiberglass and plaster     - Reason: increase ROM     - Position: prone     - Skin: intact     - Nail bed: pink     - Capillary refill: < 2 sec     - LE temperature: warm     - Sensation: light touch: intact  Right:    - Number:1     - Type: serial and short leg     - Material: fiberglass and plaster     - Reason: increase ROM     - Position: prone     - Skin: intact     - Nail bed: pink     - Capillary refill: < 2 sec     - LE temperature: warm     - Sensation: light touch: intact  Precautions reviewed: pain, lack of weight bearing, moisture, odor, cast care and removal handout provided, poor circulation and cast care           ASSESSMENT                                                                                                               Patient to undergo serial casting to address BL ankle DF ROM restrictions and calcaneal position. Pt tolerated application of short leg casts well, with no complaints of pain or discomfort. Bilateral short leg casts applies at approximately 5 deg of L ankle dorsiflexion and -8 degrees of right ankle dorsiflexion. Lela Herrera PT, DPT and Keith Jordan PTA present assisting with casting and materials. Reviewed cast care and precautions and provided ER letter.  No immediate adverse effects noted post casting. Patient is to present to therapy EOW while being serial casted.      Recommendations:   1. Continue with serial casting   2. Continue with weekly PT treatmens post casting   3. Referral to orthotist, post casting, for AFO  home health, in stable condition, and will follow up with the above and new PCP in 1-2 weeks.   Please see below for details on hospital course     Acute Bilateral cerebellar strokes /Lightheadedness and N/V/D  -symptoms started in the evening on 3/2   -MRI fabrication     Prognosis: patient will benefit from skilled therapy  Habilitative potential is: good  Predicted patient presentation: Low (stable) - Patient comorbidities and complexities, as defined above, will have little effect on progress for prescribed plan of care.  Child/Caregiver Education:   Who will be receiving education: patient and patient's parent(s)  Preferred learning style: verbal and written  Barriers to learning: no barriers apparent at this time  Results of above outlined education: Verbalizes understanding and Demonstrates understanding      PLAN                                                                                                                         The following skilled interventions to be implemented to achieve goals listed below:  Therapeutic Activity (90226)  Therapeutic Exercise (05780)  Splinting/Orthotics    Frequency / Duration: 1 times per week tapering as patient progresses for an estimated total of 12 visits for 12 weeks    Patient's parent(s) and patient involved in and agreed to plan of care and goals.  Attendance policy reviewed with patient.  Suggestions for next session as indicated: Progress per plan of care        GOALS                                                                                                                           Long Term Goals: to be met by end of plan of care  1. Child/Caregiver demonstrates understanding of cast care, wear and removal, and maintain compliance, ind in home environment.           GAS Key        -2=Much less than expected outcome (baseline); -1=Less than expected outcome (progressing);          0=Patient achieves expected outcome after intervention (goal, set at evaluation); +1=Better than          expected outcome; +2=Much better than expected outcome    Baseline: -2  2. Patient will achieve at least +10 degrees of ankle dorsiflexion, bilaterally, with knee extended to improve gait mechanics.   3. Post casting,  intervention, recommended referral to tertiary care center on non-emergent basis  -seen by ENDO, TSH low but Free T3 and T4 normal. Thyroid US with extensive nodules  -started on methimazole on 3/5 per endo for sublinclinical hyperthyroidism  -needs FU wit Lightheadedness and dizziness. TECHNIQUE: A variety of imaging planes and parameters were utilized for visualization of suspected pathology. Images were performed without contrast.  FINDINGS:  CEREBRUM: Cerebral cortical atrophy.  Moderate chronic left oc patient will be able to ambulate 500 ft, with or without AFOs and shoes donned, maintaining a heel toe strike pattern for improved gait mechanics.       Therapy procedure time and total treatment time can be found documented on the Time Entry flowsheet   M.D. on 3/06/2017 at 12:39     Approved by (CST): Doc Skiff., M.D. on 3/06/2017 at 12:41          3/6/2017  CONCLUSION:  1. Small-moderate acute wedge-shaped left cerebellar infarct measures 2.1 x 2.1 cm posterior medial aspect.  Smaller acute mult you take these medications          AmLODIPine Besylate 10 MG Tabs   Commonly known as:  NORVASC   Take 0.5 tablets (5 mg total) by mouth daily.    What changed:  how much to take       DilTIAZem HCl ER Coated Beads 120 MG Cp24   Commonly known as:  CARTIA weeks.    Specialty:  CARDIOLOGY    Contact information:    Marjorie [address]            DC instructions:       Other Discharge Instructions:         Please take all medication as prescribed  Please take aspirin an

## 2022-05-18 NOTE — TELEPHONE ENCOUNTER
Patient had called back; given patient another call back, but directed to voicemail. Left another message to call office back, directly to the [place] line. Will await call. None known in lifetime None known in lifetime

## 2023-04-26 NOTE — ANESTHESIA PROCEDURE NOTES
Airway  Urgency: elective      General Information and Staff    Patient location during procedure: OR  Anesthesiologist: Elier Macias MD  Performed: anesthesiologist     Indications and Patient Condition  Indications for airway management: anesthe PA Denied for Apidra. Would you like to Appeal?

## 2025-04-23 NOTE — PLAN OF CARE
Pt alert and orientatedx3. Lethargic. Not tolerating PO intake - MD notified. Deep suctioning done by this RN and respiratory at bedside. Increased to 3L nasal cannula. Frequent turning and repositioning. Oral care done. Plans to discharge to [facility] nutritional intake and initiate nutrition consult as needed  - Instruct patient on self management of diabetes  Outcome: Progressing     Problem: CARDIOVASCULAR - ADULT  Goal: Maintains optimal cardiac output and hemodynamic stability  Description: INTERVE ELECTROLYTES - ADULT  Goal: Glucose maintained within prescribed range  Description: INTERVENTIONS:  - Monitor Blood Glucose as ordered  - Assess for signs and symptoms of hyperglycemia and hypoglycemia  - Administer ordered medications to maintain glucose Pt had 2.83 sec pause on tele, asymptomatic. Pt afib on tele prior to and after the pause. precautions  - Recommend use of  RW for transfers and ambulation  Outcome: Progressing Patient bradycardic while sleeping to low 40s and dips into 38, 39.

## 2025-05-03 NOTE — TELEPHONE ENCOUNTER
pts brother called stating that pt has been admitted to the hospital (cardiac arrest) and he is trying to find out if pt was seen with a cardiologist before, if pt was tested for any cardiac problems before  Please call back brother at [phone]  Pt has warm/dry

## 2025-05-23 NOTE — PLAN OF CARE
Please tell her she is cleared to go for surgery by me and cardiologist   Problem: Patient/Family Goals  Goal: Patient/Family Long Term Goal  Patient’s Long Term Goal:To be ready for discharge    Interventions:  - monitor vital signs  -administer medications  - See additional Care Plan goals for specific interventions   Outcome: PAST MEDICAL HISTORY:  Hypertension     Kidney stone

## 2025-05-28 NOTE — TELEPHONE ENCOUNTER
Left detailed message on voice mail as patient requested. Left message that at his last office visit on 5/24/18 (treatment/plan is copied and pasted below)  he was to start bicalutamide 50 mg daily for 2 weeks; that patient decided against long-term bicalutamide; agreed to take it for 2 weeks. Therefore I left message that he should have taken medication for 2 weeks, there was not a refill on the prescription.      4.  Start bicalutamide 50 mg daily for the first 2 weeks; I discussed option of continuing oral bicalutamide long-term and he decides against it but he does agree to taking it for the first 2 weeks no

## (undated) DEVICE — LINE MNTR ADLT SET O2 INTMD

## (undated) DEVICE — 35 ML SYRINGE REGULAR TIP: Brand: MONOJECT

## (undated) DEVICE — FORCEP RADIAL JAW 4

## (undated) DEVICE — Device: Brand: DEFENDO AIR/WATER/SUCTION AND BIOPSY VALVE

## (undated) DEVICE — Device: Brand: CUSTOM PROCEDURE KIT

## (undated) DEVICE — CONMED SCOPE SAVER BITE BLOCK, 20X27 MM: Brand: SCOPE SAVER

## (undated) NOTE — LETTER
[address], [place]  Authorization for Invasive Procedures  1.  I hereby authorize Dr. Wendi Gan , my physician and whomever may be designated as the doctor's assistant, to perform the following operation and/or procedure: Esophag producst. The following are some, but not all, of the potential risks that can occur: fever and allergic reactions, hemolytic reactions, transmission of disease such as hepatitis, AIDS, cytomegalovirus (CMV), and flluid overload.  In the event that I wish t sedation/analgesia as may be necessary or desirable in the judgment of my physician.      Signature of Patient:  ________________________________________________ Date: _________Time: _________    Responsible person in case of minor or unconscious: _________

## (undated) NOTE — LETTER
Hospital Discharge Documentation  Patient was discharged to [facility] of [address] [facility].  (SNF phone #: [phone].)      From: [address] Hospitalist's Office  Phone: [phone]    Patient discharged time/date: 7/16/2021 EKG showed atrial fibrillation, rapid ventricular response rate 130 to 150, started on IV Cardizem drip without significant improvement. He will be admitted to the hospital for further management.     HOSPITAL COURSE:  Patient was admitted, underwent AV no judgment normal.     DISCHARGE MEDICATIONS[WW.1]     Discharge Medications      START taking these medications      Instructions Prescription details   HYDROcodone-acetaminophen 5-325 MG Tabs  Commonly known as: NORCO      Take 1 tablet by mouth every 6 (s  your prescriptions at the location directed by your doctor or nurse    Bring a paper prescription for each of these medications  · HYDROcodone-acetaminophen 5-325 MG Tabs[WW.2]         CONSULTANTS  Consultants  Chat With All Active Members    [place]

## (undated) NOTE — IP AVS SNAPSHOT
[facility]            (For Outpatient Use Only) Initial Admit Date: 1/1/2021   Inpt/Obs Admit Date: Inpt: 1/1/21 / Obs: N/A   Discharge Date:    Zacarias Aguilar:  [de-identified]   MRN: [de-identified]   CSN: [id number]   CEID: [id number]        [facility] Subscriber Name:  Gaudencio Burkett :    Subscriber ID:  Pt Rel to Subscriber:    Hospital Account Financial Class: Medicare Advantage    2021

## (undated) NOTE — LETTER
[address], [place]  Authorization for Invasive Procedures  1.  I hereby authorize Dr. Adarsh Crow, my physician and whomever may be designated as the doctor's assistant, to perform the following operation and/or procedure:  Left cardi 4. Should the need arise during my operation or immediate post-operative period; I also consent to the administration of blood and/or blood products.  Further, I understand that despite careful testing and screening of blood and blood products, I may still 9. Patients having a sterilization procedure: I understand that if the procedure is successful the results will be permanent and it will therefore be impossible for me to inseminate, conceive or bear children.  I also understand that the procedure is intend

## (undated) NOTE — LETTER
Hospital Discharge Documentation  Please phone to schedule a hospital follow up appointment.     From: [address] Hospitalist's Office  Phone: [phone]    Patient discharged time/date: 4/6/2021  3:58 PM  Patient discharge disposition:  [place] [address] 23-polyvalent pneumococcal polysaccharide vaccine     Elevated PSA     Mixed hyperlipidemia     Prostatic adenocarcinoma ([address]     Influenza vaccination declined by patient     Optic nerve atrophy     Cataract of both eyes     Aortic atherosclerosis ([address] diuretics - now stopped  - repeat Echo - EF 40-45%  - ASA, statin, ACEi, and BB.   - Strict I&Os, Daily weights, Fluid restriction  - Cardiology on consult, appreciate recs.   - resume torsemide on dc, plan heart failure clinic f/u     GI bleed with anemia 7, 2021  Quantity: 12 tablet  Refills: 0        CHANGE how you take these medications      Instructions Prescription details   Amiodarone HCl 400 MG Tabs  Commonly known as: PACERONE  What changed:   · medication strength  · how much to take  · when to eros DEMADEX      Take 1 tablet (10 mg total) by mouth daily.    Quantity: 30 tablet  Refills: 0        STOP taking these medications    amLODIPine Besylate 10 MG Tabs  Commonly known as: NORVASC        dilTIAZem HCl ER Coated Beads 180 MG Tb24  Commonly known a

## (undated) NOTE — MR AVS SNAPSHOT
[address] at [address] [place] [address].  [address]  [place] [address]  [phone]               Thank you for choosing us for your health care visit with Gisela Stinson MD.  We are glad to serve you and happy to prov PSA (Screening) [E]    Complete by:  Shaan 15, 2017 (Approximate)    Assoc Dx:  Encounter for routine laboratory testing [Z00.00]           CBC, Platelet, No Differential [E]    Complete by:  Shaan 15, 2017 (Approximate)    Assoc Dx:  Encounter for routine la requirements for authorization, please wait 5-7 days and then contact your physician's office. At that time, you will be provided with any authorization numbers or be assured that none are required. You can then schedule your appointment.  Failure to obtain activities. In fact, daily activities may help you to regain muscle strength and bring back function to affected limbs. Bathing and dressing  By learning a few new ways of doing things, most people who have had a stroke can bathe and dress themselves.  You Today's Vital Signs     BP Pulse Height Weight BMI    128/70 mmHg 71 62.5\" 158 lb 28.42 kg/m2         Current Medications          This list is accurate as of: 3/15/17 11:59 PM.  Always use your most recent med list.                AmLODIPine Besylate 5 M Enter your evly Activation Code exactly as it appears below along with your Zip Code and Date of Birth to complete the sign-up process. If you do not sign up before the expiration date, you must request a new code.     Your unique evly Access Code: [email or web address]

## (undated) NOTE — LETTER
No referring provider defined for this encounter. 11/26/18        Patient: Nils Fonseca   YOB: 1945   Date of Visit: 11/26/2018       Dear  Dr. Spencer Ozuna,     Thank you for referring Nils Fonseca to my practice.   Adriano (N52.9) Erectile dysfunction, unspecified erectile dysfunction type  Plan: Pt is aware of this problem and belives it is stable.   Pt will continue to observe.    (I48.91) Atrial fibrillation, unspecified type ([facility]  Plan: Pt has history of Afib and CVA; pt

## (undated) NOTE — MR AVS SNAPSHOT
[address] [place] [address] [phone]  [phone]               Thank you for choosing us for your health care visit with Vickye Dubin, NP.   We are glad to serve you and happy to provide yo chest pain, shortness of breath, coughing, swelling, weight gain or worsening symptoms. Follow up with Dr. Navarro Ledezma on 3-18-17    Follow up with the atrial fib clinic in 4 weeks.      Follow up with Dr. Julienne Castillo, neurologist in 3 weeks, call to make an Commonly known as:  CARTIA XT           methimazole 5 MG Tabs   Take 1 tablet (5 mg total) by mouth daily. Commonly known as:  TAPAZOLE                   Results of Recent Testing       MyChart     Sign up for [facility], your secure online medical record. Visit [facility] online at  [email or web address]

## (undated) NOTE — MR AVS SNAPSHOT
After Visit Summary   3/15/2017    Jocelyn Irene    MRN: [id number]           Visit Information        Provider Department Dept Phone    3/15/2017 11:45 AM Denita Lopez MD [address] [phone]      Your Vitals Were     BP Pulse Ht Wt BMI S OPHTHALMOLOGY - INTERNAL [[id number] CUSTOM]     URINALYSIS, ROUTINE [[id number] CUSTOM]     Future Labs/Procedures Expected by Expires    CBC, PLATELET; NO DIFFERENTIAL [[id number] CUSTOM]  6/15/2017 (Approximate) 7/01/8309    COMP METABOLIC PANEL (14) [2035255 After your stroke, you may not be able to control your bladder and bowels. Nurses will work closely with you to set up a new routine. · You may be taken to the toilet on a schedule — perhaps every 2 hours to 3 hours.  Making a bathroom stop before going ou Create a "[employer] Username. This will be your [employer] login Username and cannot be changed, so think of one that is secure and easy to remember. Create a "[employer] password. You can change your password at any time.     Choose a Security Question and enter

## (undated) NOTE — IP AVS SNAPSHOT
[facility]            (For Outpatient Use Only) Initial Admit Date: 7/4/2021   Inpt/Obs Admit Date: Inpt: 7/4/21 / Obs: N/A   Discharge Date:    Arnel Dickson:  [de-identified]   MRN: [de-identified]   CSN: [id number]   CEID: [id number]        [facility] Name:  Mckinley Stanley :    Subscriber ID:  Pt Rel to Subscriber:    Hospital Account Financial Class: Medicare Advantage    2021

## (undated) NOTE — IP AVS SNAPSHOT
Patient Demographics     Address  [address] [place] [address],Ground Floor [address] Phone  [phone] [facility])  [phone] (Mobile) *Preferred*      Emergency Contact(s)     Name Relation Home Work Mg Brother [phone]      friend, Meredith Arreola tablet by mouth every 6 (six) hours as needed. GEORGINA Hutson         Insulin Aspart Pen 100 UNIT/ML Sopn  Commonly known as: NOVOLOG      Inject 1-7 Units into the skin 3 (three) times daily with meals.    GEORGINA Hutson         metoprolo Patient's Most Recent Weight       Most Recent Value   Patient Weight  45.3 kg (99 lb 14.4 oz)         Lab Results Last 24 Hours      Basic Metabolic Panel (8) [[id number]] (Abnormal)  Resulted: 07/16/21 0548, Result status: Final result   Ordering provider 7:54 AM Status: Signed    : Percy Jarrell MD (Physician)       [address]le [address] NAME: Vadim Ramsey   ATTENDING PHYSICIAN: Rodrigo Francois MD   PATIENT ACCOUNT#:   [id number]    LOCATION:  [place] [address]  MEDICAL RECORD #:   [id number]    Dictated By Tyron Kohler MD  d: 07/14/2021 12:49:01  t: 07/14/2021 13:07:31  Job [id number]  FB/  [FB.1]  Electronically signed by Jordan Talamantes MD on 7/15/2021  7:54 AM   Attribution Key    FB. 1 - Jordan Talamantes MD on 7/14/2021 7/14/2021      History of present illness:    The patient is a 67year old who has CAD that has been stable by cath within the past year, chronic CHF with improved LVEF after medical therapy, sinus node dysfunction, ventricular tachycardia, and a dual chamb COLONOSCOPY N/A 4/5/2021    Procedure: COLONOSCOPY;  Surgeon: Vivienne López MD;  Location: [address] [place] [address] ENDOSCOPY   • EP DUAL CHAMBER ICD  1/22/2021        • OTHER      uk     Family History   Problem Relation Age of Onset   • Cancer Father         brain CA 01/10/2021    T4F 6.1 (H) 07/14/2021    TSH <0.005 (L) 07/14/2021    PSA 0.03 07/09/2021    MG 2.4 07/14/2021    PHOS 2.6 01/06/2021    TROP <0.045 07/14/2021    B12 405 07/09/2021       XR CHEST AP PORTABLE  (CPT=71045)    Result Date: 7/14/2021  CONCLUSI on 7/16/2021 10:11 AM               Physical Therapy Note signed by Russell Gomez PT at 7/15/2021 11:59 AM  Version 2 of 2    Author:  Russell Gomez PT Service: Rehab Author Type: Physical Therapist    Filed: 7/15/2021 11:59 AM Date of Service: 7/15 throughout. Pt with increased speed during tx d/t fatigue. Mod SOB and fatigue during tx, SPO2 90% on 1L O2, HR 70 bpm. Symptoms improved with 2 min seated rest. Pt reclined in chair with B heels elevated and warm blanket for comfort.  Pt left in chair, all 3/9/2017   • No diabetic retinopathy in both eyes    • Prostate cancer ([facility] 04/16/2018   • PSVT (paroxysmal supraventricular tachycardia) ([facility] 3/9/2017    S/p SVT ablation 10-31-17    • Stroke [facility])    • SVT (supraventricular tachycardia) ([facility]     s/p a increased time and follows one step commands with repetition  · Safety Judgement:  decreased awareness of need for safety  · Awareness of Errors:  decreased awareness of errors   · Awareness of Deficits:  decreased awareness of deficits    RANGE OF MOTION use of bed rail, verbal/tactile cues for sequencing    Transfers: sit to stand tx with rolling walker at MIN A x 2    Exercise/Education Provided:  Bed mobility  Energy conservation  Functional activity tolerated  Gait training  Posture  Transfer training Discharge Planning    PHYSICAL THERAPY ASSESSMENT     Patient is a 67year old male admitted 7/14/2021 for atrial fibrillation with RVR, s/p ablation of AV node on 7/14. Pt admitted previously from 7/4/21-7/13/21 for CHF and atrial fibrillation with RVR. admission on 7/4, pt was living alone and MOD IND with ADLs. Anticipate pt will make functional gains in a rehab setting. PT recommendation return to sub acute rehab at d/c to address deficits and maximize patient independence.  Pt would benefit from 24 hr OTHER      [facility]     HOME SITUATION  Type of Home: Skilled nursing facility (for rehab)   Home Layout: One level  Stairs to Enter : 0  Stairs to Bedroom: 0  Lives With: Staff 24 hours  Drives: No  Patient Owned Equipment: Rolling walker  Patient Regularly Us 114/44 mmHg  *no c/o dizziness, MOD SOB and fatigue, improving with 2 min seated rest    AM-PAC '6-Clicks' INPATIENT SHORT FORM - BASIC MOBILITY  How much difficulty does the patient currently have. ..  -   Turning over in bed (including adjusting bedclothe Stand at assistance level: supervision with walker - rolling     Goal #2  Current Status    Goal #3 Patient is able to ambulate 100 feet with assist device: walker - rolling at assistance level: supervision   Goal #3   Current Status    Goal #4 Patient to family present during session.     Pt completed ADLs and mobility this session with up to Mod A .  Pt's upper extremity strength and range of motion are [facility] for oral facial hygiene, upper extremity dressing, and self- feeding in supported sitting position p • Stroke [facility])    • SVT (supraventricular tachycardia) ([address]     s/p ablation   • Tobacco use disorder 3/9/2017    55+ pack year history    • VT (ventricular tachycardia) (HCC)        Past Surgical History  Past Surgical History:   Procedure Laterality Eating meals?: A Little    AM-PAC Score:  Score: 14  Approx Degree of Impairment: 59.67%  Standardized Score (AM-PAC Scale): 33.39  CMS Modifier (G-Code): CK    FUNCTIONAL TRANSFER ASSESSMENT  Supine to Sit : Moderate assistance  Sit to Stand: Minimum ass Multidisciplinary Problems     Active Goals        Problem: Patient/Family Goals    Goal Priority Disciplines Outcome Interventions   Patient/Family Long Term Goal     Interdisciplinary Progressing    Description: Patient's Long Term Goal: to go home    In

## (undated) NOTE — LETTER
[facility]  Administration of Anesthesia  1. Diana Yun, or _________________________________ acting on his behalf, (Patient) (Dependent/Representative) request to receive anesthesia for my pending procedure/operation/treatment. bleeding, seizure, cardiac arrest and death. 7. AWARENESS: I understand that it is possible (but unlikely) to have explicit memory of events from the operating room while under general anesthesia.   8. ELECTROCONVULSIVE THERAPY PATIENTS: This consent serve below affirms that prior to the time of the procedure, I have explained to the patient and/or his/her guardian, the risks and benefits of undergoing anesthesia, as well as any reasonable alternatives.     ___________________________________________________

## (undated) NOTE — LETTER
[facility], [place]  Authorization for Invasive Procedures  1.  I hereby authorize Dr. Cesar Galindo , my physician and whomever may be designated as the doctor's assistant, to perform the following operation and/or procedure:  Nico Brock occur: fever and allergic reactions, hemolytic reactions, transmission of disease such as hepatitis, AIDS, cytomegalovirus (CMV), and flluid overload.  In the event that I wish to have autologous transfusions of my own blood, or a directed donor transfusion Signature of Patient:  ________________________________________________ Date: _________Time: _________    Responsible person in case of minor or unconscious: _____________________________Relationship: ____________     Witness Signature: _______________

## (undated) NOTE — LETTER
[address] [facility]     I agree to have a Peripherally Inserted Central Catheter (PICC) placed in my arm.    1. The PICC insertion procedure, care, maintenance, risks, benefits, and complications have been explained to me b 7. The person performing this procedure has discussed the potential benefits, risks, and side effects of the PICC; the likelihood of achieving goals; and potential problems that might occur during recuperation.  They also discussed reasonable alternatives t

## (undated) NOTE — IP AVS SNAPSHOT
Patient Demographics     Address  [address] [place] [address],Ground Floor [address] Phone  [phone] [facility])  [phone] (Mobile) *Preferred*      Emergency Contact(s)     Name Relation Home Work [place]    Carol Sales Commonly known as: LIPITOR  Next dose due: Tonight       Take 20 mg by mouth nightly. lisinopril 5 MG Tabs  Next dose due: Tomorrow morning      Take 1 tablet (5 mg total) by mouth daily.    Nick Dawkins MD         Metoprolol Succinate  MG T Patient's Most Recent Weight       Most Recent Value   Patient Weight  69.6 kg (153 lb 6.4 oz)         Lab Results Last 24 Hours      BASIC METABOLIC PANEL (8) [[id number]] (Abnormal)  Resulted: 01/13/21 0722, Result status: Final result   Ordering provi The following orders were created for panel order CBC WITH DIFFERENTIAL WITH PLATELET.   Procedure                               Abnormality         Status                     ---------                               -----------         ------ WBC 7.1 4.0 - 11.0 x10(3) uL — [facility] ([facility])   RBC 3.45 3.80 - 5.80 x10(6)uL L [facility] ([facility])   HGB 10.8 13.0 - 17.5 g/dL L [facility] ([facility])   HCT 33.2 39.0 - 53.0 % L [facility] ([facility])   MCV 96.2 80.0 - 100.0 fL — [facility])   [facility]CH 31. 3 Pt is a 68 yo with h/o HTN, Afib, SVT s/p ablation, VT and unk other med problems who collapsed at his residence extended stay [place] with bystander CPR. EMS called and found pt in VT and shock x 2, VF s/p shock and then Afib when arrived in ER.  Intubate •  fentaNYL citrate (SUBLIMAZE) 0.05 MG/ML injection 25 mcg, 25 mcg, Intravenous, Q1H PRN    •  [COMPLETED] norepinephrine (LEVOPHED) 4 mg/250 ml premix infusion, , ,     •  norepinephrine (LEVOPHED) 4 mg/250 ml premix infusion, 0.5-30 mcg/min, Intravenous Out of hospital cardiac arrest  Etiology not clear  No clear ischemia  Started therapeutic hypothermia  Downtime est 15 minutes  Possible PNA  Not on pressors but pressure lower  Lactic acid has cleared  Plan CCU   Therapeutic hypothermia   ECHO   Pressors History of Present Illness: Review of the records show that this  67year old, male  with PMH significant for atrial fibrillation, SVT s/p ablation, VT prior left occipital stroke, hypertension was admitted this [facility] for VT cardiac arrest (out • Metoprolol Succinate ER  75 mg Oral Daily Beta Blocker   • torsemide  10 mg Oral Daily   • amiodarone HCl  200 mg Oral BID with meals       acetaminophen **OR** traMADol HCl **OR** traMADol HCl, HYDROcodone-acetaminophen **OR** HYDROcodone-acetaminophen, Negative for any headaches or vision problems or hearing problems dysphagia chest pain or shortness of breath or abdominal pain no nausea vomiting or constipation or diarrhea. No significant weight changes or appetite changes or sleep problems.   No joint RDW 15.3* 15.2* 15.0 15.2* 15.4*   NEPRELIM 4.27 4.27  --  5.80  --    WBC 5.7 6.0 5.7 7.6 6.7   .0* 153.0 163.0 187.0 217.0     Recent Labs   Lab 01/10/21  0505 01/11/21  0526 01/11/21  1407   * 128* 101*   BUN 11 12 16   CREATSERUM 0.77 0.8 · This treatment can not be provided at a lower level of care.  Requires closer medical supervision than available in other levels of rehab care to manage concomitant medical issues, maintain medical stability and prevent complications, assure adequate pain RC.2 - Talita Markham MD on 1/12/2021 12:28 PM                     D/C Summary    No notes of this type exist for this encounter.      Imaging Results (HF patients)    Chest X-Ray Results (HF patients only)    Xr Chest Pa + Lat Chest (cpt=71046)    Result Presenting Problem: (cardiac arrest)    Problem List  Principal Problem:    Cardiac arrest [facility])  Active Problems:    V-tach [facility])    Atrial fibrillation, unspecified type ([address]    Ventricular fibrillation (HCC)    Short-term memory loss      PHYSICAL THER -   Turning over in bed (including adjusting bedclothes, sheets and blankets)?: A Lot   -   Sitting down on and standing up from a chair with arms (e.g., wheelchair, bedside commode, etc.): A Lot   -   Moving from lying on back to sitting on the side of th Goal #6  Current Status[CK. 1]         Attribution Key    CK. 1 - Blayne Perkins PTA on 1/13/2021  8:41 AM               Physical Therapy Note signed by Blayne Perkins PTA at 1/12/2021 10:47 AM  Version 1 of 1    Author: Blayne Perkins PTA BALANCE                                                                                                                     Static Sitting: Fair  Dynamic Sitting: Fair -           Static Standing: Poor +  Dynamic Standing: Poor +    ACTIVITY TOLERANCE Current Status  not met, mod A   Goal #3 Patient is able to ambulate 15 feet with assist device: walker - rolling at assistance level: min A   Goal #3   Current Status  pt amb 5 ft with RW and min a   Goal #4     Goal #4   Current Status     Goal #5 Kd PT Treatment Plan: Bed mobility; Endurance; Patient education    SUBJECTIVE  Pt reports being ready for PT RX    OBJECTIVE  Precautions: Cardiac    WEIGHT BEARING RESTRICTION  Weight Bearing Restriction: L upper extremity     L Upper Extremity: Non-Weight Be Patient Goal Patient's self-stated goal is: to go home   Goal #1 Patient is able to demonstrate supine - sit EOB @ level: independent      Goal #1   Current Status Not met, Mod A   Goal #2 Patient is able to demonstrate transfers Sit to/from Stand at [facility] Pt seen for swallowing therapy to monitor swallowing tolerance and train swallowing precautions per recommendations for upgraded diet to chopped and thin liquids/no straw. Collaborated with RN, and speech visit approved.   RN reports the pt is tolerating d upgrade at this time. Pt would benefit from continued speech therapy to continue training on swallowing precautions and improve independence of strategies. Speech to monitor swallowing tolerance and train swallowing precautions 1-2x at a meal, as able. Good tolerance with chopped solids without overt clinical signs of aspiration. Pt is edentulous but tolerated new upgraded diet of chopped solids well. Bite and mastication completed with mashing bolus with his gums and tongue.   Increased duration of time Problem: Patient/Family Goals    Goal Priority Disciplines Outcome Interventions   Patient/Family Long Term Goal     Interdisciplinary Progressing    Description: Patient's Long Term Goal: To go home    Interventions:  - Oxygen therapy  - See additional C

## (undated) NOTE — LETTER
[facility], [place]  Authorization for Invasive Procedures  1.  I hereby authorize Dr. Korina Walters , my physician and whomever may be designated as the doctor's assistant, to perform the following operation and/or procedure:  Dual [place] 4. Should the need arise during my operation or immediate post-operative period; I also consent to the administration of blood and/or blood products.  Further, I understand that despite careful testing and screening of blood and blood products, I may still 9. Patients having a sterilization procedure: I understand that if the procedure is successful the results will be permanent and it will therefore be impossible for me to inseminate, conceive or bear children.  I also understand that the procedure is intend

## (undated) NOTE — LETTER
Hospital Discharge Documentation  Patient was discharged to  [facility], phone #: [phone]     PLAN:    From: [address] Hospitalist's Office  Phone: [phone]    Patient discharged time/date: 1/13/2021  2:08 PM  Patient discharge dis Patient was admitted to the ICU after resuscitation. Underwent hypothermia protocol. Had cardiac catheterization. Echocardiogram showed significant cardiomyopathy. ICD was placed. Patient also developed atrial fibrillation, started on amiodarone.   Sta Take 1 tablet (100 mg total) by mouth Daily Beta Blocker. Quantity: 30 tablet  Refills: 0     Rivaroxaban 20 MG Tabs  Commonly known as: XARELTO      Take 1 tablet (20 mg total) by mouth daily with food.    Quantity: 30 tablet  Refills: 0     torsemide 1 [address] Morales [address]  [address] [place] [address]  Schedule an appointment as soon as possible for a visit  after discharge from [place] [address]    The above plan and follow-up instructions we

## (undated) NOTE — LETTER
No referring provider defined for this encounter. 12/04/17        Patient: Parish Guerra   YOB: 1945   Date of Visit: 12/4/2017       Dear  Dr. Mariam Bui,       Thank you for referring Parish Guerra to my practice.   Ple category.  Patient feels this problem is stable; I discussed, explained, and answered questions on treatment options and patient understood and chooses to continue tamsulosin 0.4 mg.      (Z79.01) On clopidogrel therapy  Patient is on Plavix and at risk for your aspirin for 7 days and you are Plavix for 7 days in order to perform prostate biopsy; that is a treatment option that you are thinking about; stopping aspirin and Plavix could pose you some risks of a blood clot complication; if we were to perform a b

## (undated) NOTE — LETTER
[place] [facility]  Administration of Anesthesia  1. Conrad Sherman, or _________________________________ acting on his behalf, (Patient) (Dependent/Representative) request to receive anesthesia for my pending procedure/operation/treatment.   A 6. OBSTETRIC PATIENTS: Specific risks/consequences of spinal/epidural anesthesia may include itching, low blood pressure, difficulty urinating, slowing of the baby's heart rate and headache.  Rare risks include infections, high spinal block, spinal bleeding ___________________________________________________           _____________________________________________________  Date/Time                                                                                               Responsible person in case of minor

## (undated) NOTE — LETTER
[address]  [facility] [place], [place]  [address]  INFORMED CONSENT FOR TRANSFUSION OF BLOOD OR BLOOD PRODUCTS  My physician has informed me of the nature, purpose, benefits and risks of transfusion for blood and blood components that ______________________________________________  (Signature of Patient)                                                            (Responsible party in case of Minor,

## (undated) NOTE — IP AVS SNAPSHOT
Patient Demographics     Address  [address] [place] [address],Ground Floor [address] Phone  [phone] [facility])  [phone] (Mobile) *Preferred*      Emergency Contact(s)     Name Relation Home Work Mg Brother [phone]      friend, Kris Youssef TAKE these medications       Instructions Authorizing Provider Morning Afternoon Evening As Needed   acetaminophen 325 MG Tabs  Commonly known as: TYLENOL  Notes to patient: * last dose 7/12 at 1:59pm      Take 650 mg by mouth every 6 (six) hours as needed mg 07/12/21 1359 Given      [id number] apixaban (ELIQUIS) tab 5 mg 07/12/21 0852 Given      [id number] aspirin chewable tab 81 mg 07/12/21 0852 Given      [id number] docusate sodium (COLACE) cap 100 mg 07/12/21 0852 Given      [id number] metoprolol tartrate (L Reference Range Flag Lab   Glucose 106 70 - 99 mg/dL [facility] [place] [facility])   Sodium 146 136 - 145 mmol/L [facility] [place] [facility])   Potassium 3.6 3.5 - 5.1 mmol/L — [facility])   Chloride 103 98 - 112 mmol/L — [place] [facility] ([facility])   CO2 38.0 21.0 - 32.0 mm Lab   .0 150.0 - 450.0 10(3)uL — [place])            Testing Performed By     Lab - Abbreviation Name Director Address Valid Date Range    [address] [facility]) [facility] ([facility]) Tati Smith.  Fabrizio Koroma M.D. [facility].  medications for about 3 months since he thinks they make him feel worse. In ED he appears to be fluid overloaded with elevated BNP and CHF with pleural effusions on CXR. He is in afib with HR ~120. He is being admitted. [JH.2]      Past Medical History:   D amiodarone HCl 200 MG Oral Tab,  Take 1 tablet (200 mg total) by mouth daily. Patient not taking: Reported on 7/4/2021    Metoprolol Succinate ER 50 MG Oral Tablet 24 Hr,  Take 1 tablet (50 mg total) by mouth 2x Daily(Beta Blocker).   Patient not taking: R Musculoskeletal:  No joint swelling  Extremities:[JH.1]  B/l LE edema[JH.2]  Neurologic:  nonfocal  Psychiatric:  Normal affect, calm and appropriate  Skin:  No rash, no lesion     Results     Recent Labs   Lab 07/04/21  1422   WBC 6.9   HGB 9.7*   HCT 31. midnight's based on the clinical documentation in H+P. Based on patients current state of illness, I anticipate that, after discharge, patient will require TBD.     Blas Poole MD[JH.1]      Electronically signed by Rafy Last MD on 7/4/2021  4:24 PM   Attr placed on Xarelto at that time.   Readmitted March 2021 due to heart failure and bright red blood per rectum status post colonoscopy Dr. Salas Angles 4/5/2021 consistent with multiple left colon clean-based nonbleeding ulcerations and radiation proctoscopy apa MD;  Location: [facility] ENDOSCOPY   • EP DUAL CHAMBER ICD  1/22/2021        • OTHER      ukn       Family History  Family History   Problem Relation Age of Onset   • Cancer Father         brain CA   • No Known Problems Mother        Social History  Social Histo PRN  Metoclopramide HCl (REGLAN) injection 10 mg, 10 mg, Intravenous, Q8H PRN  dilTIAZem BOLUS FROM BAG 10 mg infusion, 10 mg, Intravenous, Q1H PRN      acetaminophen 325 MG Oral Tab, Take 650 mg by mouth every 6 (six) hours as needed for Pain.   amiodarone Physical Exam:   Vital Signs:  Blood pressure 103/61, pulse 114, temperature 98.2 °F (36.8 °C), temperature source Oral, resp. rate 18, height 1.626 m (5' 4\"), weight 53.3 kg (117 lb 6.4 oz), SpO2 93 %.      General: No acute distress.[SR.1] Weak appearing AM          US VENOUS DOPPLER LEG RIGHT - DIAG IMG ([id number])    Result Date: 7/9/2021  CONCLUSION:  1.  There is both occlusive and nonocclusive thrombus noted in the right popliteal vein extending into 1 of the paired peroneal veins and into both posteri by Lupe Stuart MD on 7/9/2021  2:51 PM   Attribution Dickson    SR.1 - Lupe Stuart MD on 7/9/2021  2:14 PM  SR.2 - Lupe Stuart MD on 7/9/2021  2:47 PM                     D/C Summary    No notes of this type exist for this encounter.      Imaging Results (HF encourage pt to participate. Will follow-up tomorrow. [KH.1]      Attribution Key    [address] - Rosetta Hatch OT on 7/12/2021  2:49 PM                     Video Swallow Study Notes    No notes of this type exist for this encounter.         SLP Note - VF and returning to LV observed during the swallow with mildly thick liquid wash and soft solid texture. Aspiration of residuals observed during subsequent swallows post intake of puree and soft solid texture.  Weak throat clear/cough response cleared trach presents with moderate oral dysphagia swallow and known pharyngeal dysfunction per VFSS 7/07/21. Per [facility] Scale, Pt's VFSS swallow function is Level 2 of 7. Collaborated with RN regarding Pt's swallowing plan of care.  RN to place diet orders for 1/2 tsp pur inpatient SLP service warranted  Discharge Recommendations/Plan:  (palliative consult ordered per RN/undermined)[MD.2]    HISTORY   MEDICAL HISTORY[MD.1]  Reason for Referral: R/O aspiration[MD.2]    Problem List[MD.1]  Principal Problem:    Acute on chron Formation: Impaired  Bolus Propulsion: Impaired  Mastication:  (N/A)  Retention: Impaired    Pharyngeal Phase of Swallow: Impaired  Laryngeal Elevation Timing: Appears impaired  Laryngeal Elevation Strength: Appears impaired  Laryngeal Elevation Coordinati Interdisciplinary Progressing    Description: Patient's Long Term Goal: Stay out of the hospital    Interventions:  - Find a method to take my meds  - See additional Care Plan goals for specific interventions   Patient/Family Short Term Goal     Interd

## (undated) NOTE — LETTER
Hospital Discharge Documentation  Patient was discharged to [facility] ([facility] [place] [address])  [address], [address] [place] [address], [address], Phone: [phone].      From: Radha3 Mandi Roberson Hospitalist's Office  Phone: [phone]    Patient accident     BMI 25.0-25.9,adult     History of colonoscopy with polypectomy     Need for 23-polyvalent pneumococcal polysaccharide vaccine     Elevated PSA     Mixed hyperlipidemia     Prostatic adenocarcinoma ([facility] [address]     Influenza vaccination declined by p being admitted.      Hospital Course:   Acute on chronic systolic CHF  CAD  H/o cardiac arrenst  S/p AICD  Medication noncompliance  Cardiomyopathy  - proBNP 20,996 on admission  - Echo on 7/6 with EF 25% and areas of hypokinesis  - was on dobutamine gtt a to get back on his feet  - PT/OT, encouraged patient to participate as he has refused. Pt is agreeable  - sw to assist with dc planning for rehab      Vitals: Blood pressure 126/71, pulse 64, temperature 98 °F (36.7 °C), temperature source Oral, resp.  rate 1 tablet (2.5 mg total) by mouth daily.    Refills: 0                    CONTINUE taking these medications      Instructions Prescription details   acetaminophen 325 MG Tabs  Commonly known as: TYLENOL       Take 650 mg by mouth every 6 (six) hours as need

## (undated) NOTE — IP AVS SNAPSHOT
[facility]            (For Outpatient Use Only) Initial Admit Date: 7/14/2021   Inpt/Obs Admit Date: Inpt: 7/14/21 / Obs: N/A   Discharge Date:    Agapito Proper:  [de-identified]   MRN: [de-identified]   CSN: [id number]   CEID: [id number]        SQN :    Subscriber ID:  Pt Rel to Subscriber:    Hospital Account Financial Class: Medicare Advantage    2021

## (undated) NOTE — Clinical Note
03/09/2017    Dr. Juan Carlos Chapin  [address] [place] [address] [facility] [address]  [facility],     I saw Catarino Orozco in the afib clinic today.   He remains in sinus rhythm on diltiazem, BP is normal.  He was discharged on aspirin and Plavix with rece

## (undated) NOTE — IP AVS SNAPSHOT
[address]  [address] [facility] ~ [phone]                Discharge Summary   3/3/2017    Ana Magallanes           Admission Information        Provider Department    3/3/2017 Desiree Capone MD [facility] 5sw/Se Last time this was given:  5 mg on 3/7/2017  9:49 AM   Commonly known as:  Andrew Cedeno   What changed:  how much to take   Next dose due:  03/08/17 9am        Take 0.5 tablets (5 mg total) by mouth daily.     Paulette Sifuentes                           DilTIAZem and plavix and start xarelto, but this will be determined by cardiology and Neurology. Please follow up with afib clinic in 1 week and dr. Maximilian Doyle in 2-4 weeks  Please follow up with Endocrinology in 2 - 4 weeks  Please follow up with ENT in 4 weeks. Specialty:  [facility] information:    [facility]           Follow up with Johan Serrano MD. Schedule an appointment as soon as possible for a visit in 3 weeks.     Specialty:  NEUROLOGY 86.8    (03/06/17)  245 (03/06/17)  8.0    (03/05/17)  6.2 (03/05/17)  4.42 (L) (03/05/17)  12.8 (L) (03/05/17)  39.0 (L) (03/05/17)  88.2    (03/05/17)  254 (03/05/17)  8.3      Recent Hematology Lab Results (cont.)  (Last 3 results in the past 90 days) - If you don’t have insurance, Talat Meneses has partnered with [facility] to help you get signed up for insurance coverage.   [facility] is a Federal Navigator program that can help with your Affordable Care Act cover AmLODIPine Besylate 10 MG Oral Tab    DilTIAZem HCl ER Coated Beads (CARTIA XT) 120 MG Oral Capsule SR 24 Hr         Use: Treat abnormal blood pressure (high or low), cardiac conditions; and/or abnormal heart rates/rhythms   Most common side effects: Dizz

## (undated) NOTE — LETTER
[place], [address]ma  [address]       10/04/17        Patient: Dutch Armenta   YOB: 1945   Date of Visit: 10/4/2017       Dear  Dr. Jana Urbina MD,      Thank you for referring Dutch Armenta finasteride for his BPH after I have a better understanding of his PSA trends.     (R35.1) Nocturia  His American u[employer]ic Association voiding score is  12,  moderate  voiding dysfunction category.  I fully explained benefits, risks, alternatives of startin Patient has a 62 pack year history and I advised pt that he is at 4x greater risk for bladder cancer; he should take measures to quit smoking immediatly. RECOMMENDATIONS AND TREATMENT PLAN      1.    Blood draw for PSA--total and free today to investi Dr. Holly García M.D., [address] electronically generated by:  Kelly Roldan